# Patient Record
Sex: FEMALE | Race: BLACK OR AFRICAN AMERICAN | NOT HISPANIC OR LATINO | Employment: FULL TIME | URBAN - METROPOLITAN AREA
[De-identification: names, ages, dates, MRNs, and addresses within clinical notes are randomized per-mention and may not be internally consistent; named-entity substitution may affect disease eponyms.]

---

## 2023-04-03 LAB
ALANINE AMINOTRANSFERASE (SGPT) (U/L) IN SER/PLAS: 19 U/L (ref 7–45)
ALBUMIN (G/DL) IN SER/PLAS: 4.3 G/DL (ref 3.4–5)
ALKALINE PHOSPHATASE (U/L) IN SER/PLAS: 84 U/L (ref 33–110)
ANION GAP IN SER/PLAS: 13 MMOL/L (ref 10–20)
ASPARTATE AMINOTRANSFERASE (SGOT) (U/L) IN SER/PLAS: 13 U/L (ref 9–39)
BILIRUBIN TOTAL (MG/DL) IN SER/PLAS: 0.4 MG/DL (ref 0–1.2)
CALCIUM (MG/DL) IN SER/PLAS: 9.8 MG/DL (ref 8.6–10.6)
CARBON DIOXIDE, TOTAL (MMOL/L) IN SER/PLAS: 28 MMOL/L (ref 21–32)
CHLORIDE (MMOL/L) IN SER/PLAS: 105 MMOL/L (ref 98–107)
CHOLESTEROL (MG/DL) IN SER/PLAS: 255 MG/DL (ref 0–199)
CHOLESTEROL IN HDL (MG/DL) IN SER/PLAS: 36.6 MG/DL
CHOLESTEROL/HDL RATIO: 7
CREATININE (MG/DL) IN SER/PLAS: 0.58 MG/DL (ref 0.5–1.05)
GFR FEMALE: >90 ML/MIN/1.73M2
GLUCOSE (MG/DL) IN SER/PLAS: 107 MG/DL (ref 74–99)
LDL: 172 MG/DL (ref 0–99)
NON HDL CHOLESTEROL: 218 MG/DL
POTASSIUM (MMOL/L) IN SER/PLAS: 4.2 MMOL/L (ref 3.5–5.3)
PROTEIN TOTAL: 7 G/DL (ref 6.4–8.2)
SODIUM (MMOL/L) IN SER/PLAS: 142 MMOL/L (ref 136–145)
THYROTROPIN (MIU/L) IN SER/PLAS BY DETECTION LIMIT <= 0.05 MIU/L: 1.1 MIU/L (ref 0.44–3.98)
TRIGLYCERIDE (MG/DL) IN SER/PLAS: 231 MG/DL (ref 0–149)
URATE (MG/DL) IN SER/PLAS: 6.7 MG/DL (ref 2.3–6.7)
UREA NITROGEN (MG/DL) IN SER/PLAS: 13 MG/DL (ref 6–23)
VLDL: 46 MG/DL (ref 0–40)

## 2023-07-06 ENCOUNTER — LAB (OUTPATIENT)
Dept: LAB | Facility: LAB | Age: 53
End: 2023-07-06
Payer: COMMERCIAL

## 2023-07-06 LAB
ALANINE AMINOTRANSFERASE (SGPT) (U/L) IN SER/PLAS: 27 U/L (ref 7–45)
ALBUMIN (G/DL) IN SER/PLAS: 4.4 G/DL (ref 3.4–5)
ALKALINE PHOSPHATASE (U/L) IN SER/PLAS: 88 U/L (ref 33–110)
ANION GAP IN SER/PLAS: 14 MMOL/L (ref 10–20)
ASPARTATE AMINOTRANSFERASE (SGOT) (U/L) IN SER/PLAS: 16 U/L (ref 9–39)
BILIRUBIN TOTAL (MG/DL) IN SER/PLAS: 0.4 MG/DL (ref 0–1.2)
CALCIUM (MG/DL) IN SER/PLAS: 9.8 MG/DL (ref 8.6–10.6)
CARBON DIOXIDE, TOTAL (MMOL/L) IN SER/PLAS: 26 MMOL/L (ref 21–32)
CHLORIDE (MMOL/L) IN SER/PLAS: 107 MMOL/L (ref 98–107)
CHOLESTEROL (MG/DL) IN SER/PLAS: 211 MG/DL (ref 0–199)
CHOLESTEROL IN HDL (MG/DL) IN SER/PLAS: 37.3 MG/DL
CHOLESTEROL/HDL RATIO: 5.7
CREATINE KINASE (U/L) IN SER/PLAS: 89 U/L (ref 0–215)
CREATININE (MG/DL) IN SER/PLAS: 0.64 MG/DL (ref 0.5–1.05)
ESTIMATED AVERAGE GLUCOSE FOR HBA1C: 120 MG/DL
GFR FEMALE: >90 ML/MIN/1.73M2
GLUCOSE (MG/DL) IN SER/PLAS: 90 MG/DL (ref 74–99)
HEMOGLOBIN A1C/HEMOGLOBIN TOTAL IN BLOOD: 5.8 %
LDL: 144 MG/DL (ref 0–99)
POTASSIUM (MMOL/L) IN SER/PLAS: 4.8 MMOL/L (ref 3.5–5.3)
PROTEIN TOTAL: 7.4 G/DL (ref 6.4–8.2)
SODIUM (MMOL/L) IN SER/PLAS: 142 MMOL/L (ref 136–145)
THYROTROPIN (MIU/L) IN SER/PLAS BY DETECTION LIMIT <= 0.05 MIU/L: 1.11 MIU/L (ref 0.44–3.98)
TRIGLYCERIDE (MG/DL) IN SER/PLAS: 150 MG/DL (ref 0–149)
URATE (MG/DL) IN SER/PLAS: 7.8 MG/DL (ref 2.3–6.7)
UREA NITROGEN (MG/DL) IN SER/PLAS: 14 MG/DL (ref 6–23)
VLDL: 30 MG/DL (ref 0–40)

## 2023-07-13 LAB — TOBACCO SCREEN, URINE: POSITIVE

## 2023-09-12 LAB
ALANINE AMINOTRANSFERASE (SGPT) (U/L) IN SER/PLAS: 25 U/L (ref 7–45)
ALBUMIN (G/DL) IN SER/PLAS: 4.4 G/DL (ref 3.4–5)
ALKALINE PHOSPHATASE (U/L) IN SER/PLAS: 89 U/L (ref 33–110)
ANION GAP IN SER/PLAS: 14 MMOL/L (ref 10–20)
ASPARTATE AMINOTRANSFERASE (SGOT) (U/L) IN SER/PLAS: 15 U/L (ref 9–39)
BILIRUBIN TOTAL (MG/DL) IN SER/PLAS: 0.3 MG/DL (ref 0–1.2)
CALCIUM (MG/DL) IN SER/PLAS: 9.6 MG/DL (ref 8.6–10.6)
CARBON DIOXIDE, TOTAL (MMOL/L) IN SER/PLAS: 27 MMOL/L (ref 21–32)
CHLORIDE (MMOL/L) IN SER/PLAS: 104 MMOL/L (ref 98–107)
CREATININE (MG/DL) IN SER/PLAS: 0.66 MG/DL (ref 0.5–1.05)
ESTIMATED AVERAGE GLUCOSE FOR HBA1C: 120 MG/DL
GFR FEMALE: >90 ML/MIN/1.73M2
GLUCOSE (MG/DL) IN SER/PLAS: 81 MG/DL (ref 74–99)
HEMOGLOBIN A1C/HEMOGLOBIN TOTAL IN BLOOD: 5.8 %
POTASSIUM (MMOL/L) IN SER/PLAS: 4.6 MMOL/L (ref 3.5–5.3)
PROTEIN TOTAL: 7.4 G/DL (ref 6.4–8.2)
SODIUM (MMOL/L) IN SER/PLAS: 140 MMOL/L (ref 136–145)
URATE (MG/DL) IN SER/PLAS: 6.8 MG/DL (ref 2.3–6.7)
UREA NITROGEN (MG/DL) IN SER/PLAS: 13 MG/DL (ref 6–23)

## 2023-09-18 LAB
6-ACETYLMORPHINE: <25 NG/ML
7-AMINOCLONAZEPAM: <25 NG/ML
ALPHA-HYDROXYALPRAZOLAM: <25 NG/ML
ALPHA-HYDROXYMIDAZOLAM: <25 NG/ML
ALPRAZOLAM: <25 NG/ML
AMPHETAMINE (PRESENCE) IN URINE BY SCREEN METHOD: NORMAL
BARBITURATES PRESENCE IN URINE BY SCREEN METHOD: NORMAL
CANNABINOIDS IN URINE BY SCREEN METHOD: NORMAL
CHLORDIAZEPOXIDE: <25 NG/ML
CLONAZEPAM: <25 NG/ML
COCAINE (PRESENCE) IN URINE BY SCREEN METHOD: NORMAL
CODEINE: <50 NG/ML
CREATINE, URINE FOR DRUG: 90 MG/DL
DIAZEPAM: <25 NG/ML
DRUG SCREEN COMMENT URINE: NORMAL
EDDP: <25 NG/ML
FENTANYL CONFIRMATION, URINE: <2.5 NG/ML
HYDROCODONE: <25 NG/ML
HYDROMORPHONE: <25 NG/ML
LORAZEPAM: <25 NG/ML
METHADONE CONFIRMATION,URINE: <25 NG/ML
MIDAZOLAM: <25 NG/ML
MORPHINE URINE: <50 NG/ML
NORDIAZEPAM: <25 NG/ML
NORFENTANYL: <2.5 NG/ML
NORHYDROCODONE: <25 NG/ML
NOROXYCODONE: <25 NG/ML
O-DESMETHYLTRAMADOL: <50 NG/ML
OXAZEPAM: <25 NG/ML
OXYCODONE: <25 NG/ML
OXYMORPHONE: <25 NG/ML
PHENCYCLIDINE (PRESENCE) IN URINE BY SCREEN METHOD: NORMAL
TEMAZEPAM: <25 NG/ML
TRAMADOL: <50 NG/ML
ZOLPIDEM METABOLITE (ZCA): <25 NG/ML
ZOLPIDEM: <25 NG/ML

## 2023-10-30 ENCOUNTER — TELEPHONE (OUTPATIENT)
Dept: PRIMARY CARE | Facility: CLINIC | Age: 53
End: 2023-10-30
Payer: COMMERCIAL

## 2023-10-30 ENCOUNTER — PHARMACY VISIT (OUTPATIENT)
Dept: PHARMACY | Facility: CLINIC | Age: 53
End: 2023-10-30
Payer: COMMERCIAL

## 2023-10-30 ENCOUNTER — SPECIALTY PHARMACY (OUTPATIENT)
Dept: PHARMACY | Facility: CLINIC | Age: 53
End: 2023-10-30

## 2023-10-30 DIAGNOSIS — M54.9 BACK PAIN, UNSPECIFIED BACK LOCATION, UNSPECIFIED BACK PAIN LATERALITY, UNSPECIFIED CHRONICITY: ICD-10-CM

## 2023-10-30 DIAGNOSIS — M25.511 BILATERAL SHOULDER PAIN, UNSPECIFIED CHRONICITY: ICD-10-CM

## 2023-10-30 DIAGNOSIS — M54.2 NECK PAIN: Primary | ICD-10-CM

## 2023-10-30 DIAGNOSIS — M25.512 BILATERAL SHOULDER PAIN, UNSPECIFIED CHRONICITY: ICD-10-CM

## 2023-10-30 PROCEDURE — RXMED WILLOW AMBULATORY MEDICATION CHARGE

## 2023-11-02 ENCOUNTER — APPOINTMENT (OUTPATIENT)
Dept: INTEGRATIVE MEDICINE | Facility: CLINIC | Age: 53
End: 2023-11-02

## 2023-11-20 ENCOUNTER — TELEPHONE (OUTPATIENT)
Dept: PRIMARY CARE | Facility: CLINIC | Age: 53
End: 2023-11-20
Payer: COMMERCIAL

## 2023-11-20 DIAGNOSIS — F41.9 ANXIETY: Primary | ICD-10-CM

## 2023-11-21 NOTE — TELEPHONE ENCOUNTER
Called pt and relayed info.  Pt will call back and make AQ appt for Kaleb.    Please put in referral to weight loss clinic.  Gave her  number.  LG

## 2023-11-28 ENCOUNTER — PHARMACY VISIT (OUTPATIENT)
Dept: PHARMACY | Facility: CLINIC | Age: 53
End: 2023-11-28
Payer: COMMERCIAL

## 2023-11-28 ENCOUNTER — SPECIALTY PHARMACY (OUTPATIENT)
Dept: PHARMACY | Facility: CLINIC | Age: 53
End: 2023-11-28

## 2023-11-28 PROCEDURE — RXMED WILLOW AMBULATORY MEDICATION CHARGE

## 2023-11-30 ENCOUNTER — PHARMACY VISIT (OUTPATIENT)
Dept: PHARMACY | Facility: CLINIC | Age: 53
End: 2023-11-30

## 2023-11-30 ENCOUNTER — TELEPHONE (OUTPATIENT)
Dept: PRIMARY CARE | Facility: CLINIC | Age: 53
End: 2023-11-30
Payer: COMMERCIAL

## 2023-11-30 DIAGNOSIS — M25.561 PAIN IN BOTH KNEES, UNSPECIFIED CHRONICITY: Primary | ICD-10-CM

## 2023-11-30 DIAGNOSIS — M25.562 PAIN IN BOTH KNEES, UNSPECIFIED CHRONICITY: Primary | ICD-10-CM

## 2023-11-30 RX ORDER — DICLOFENAC SODIUM 10 MG/G
4 GEL TOPICAL 4 TIMES DAILY
Qty: 100 G | Refills: 1 | Status: SHIPPED | OUTPATIENT
Start: 2023-11-30 | End: 2023-12-01 | Stop reason: SDUPTHER

## 2023-11-30 NOTE — TELEPHONE ENCOUNTER
"Patient sent me a Jabber stating \" can you ask Dr CARVAJAL if there is anything she can prescribe for excruciating knee pain??from me taking dupixwnt and that a side effect joint pain , all this on top of needing knee replacements\"   "

## 2023-12-01 DIAGNOSIS — M25.562 PAIN IN BOTH KNEES, UNSPECIFIED CHRONICITY: ICD-10-CM

## 2023-12-01 DIAGNOSIS — M25.561 PAIN IN BOTH KNEES, UNSPECIFIED CHRONICITY: ICD-10-CM

## 2023-12-01 RX ORDER — DICLOFENAC SODIUM 10 MG/G
4 GEL TOPICAL 4 TIMES DAILY
Qty: 100 G | Refills: 1 | Status: SHIPPED | OUTPATIENT
Start: 2023-12-01 | End: 2024-02-09 | Stop reason: WASHOUT

## 2023-12-01 NOTE — TELEPHONE ENCOUNTER
Called Pt.  Rx sent to wrong pharm.  She never uses [facility].  Updated/edited her pharm. List.  Resent Rx to correct pharmacy.

## 2023-12-05 ENCOUNTER — OFFICE VISIT (OUTPATIENT)
Dept: ORTHOPEDIC SURGERY | Facility: CLINIC | Age: 53
End: 2023-12-05
Payer: COMMERCIAL

## 2023-12-05 VITALS — WEIGHT: 208 LBS | HEIGHT: 60 IN | BODY MASS INDEX: 40.84 KG/M2

## 2023-12-05 DIAGNOSIS — M17.12 PRIMARY OSTEOARTHRITIS OF LEFT KNEE: Primary | ICD-10-CM

## 2023-12-05 PROCEDURE — 2500000005 HC RX 250 GENERAL PHARMACY W/O HCPCS: Performed by: ORTHOPAEDIC SURGERY

## 2023-12-05 PROCEDURE — 2500000004 HC RX 250 GENERAL PHARMACY W/ HCPCS (ALT 636 FOR OP/ED): Performed by: ORTHOPAEDIC SURGERY

## 2023-12-05 PROCEDURE — 20610 DRAIN/INJ JOINT/BURSA W/O US: CPT | Mod: LT | Performed by: ORTHOPAEDIC SURGERY

## 2023-12-05 PROCEDURE — 20610 DRAIN/INJ JOINT/BURSA W/O US: CPT | Performed by: ORTHOPAEDIC SURGERY

## 2023-12-05 RX ORDER — METHYLPREDNISOLONE ACETATE 40 MG/ML
40 INJECTION, SUSPENSION INTRA-ARTICULAR; INTRALESIONAL; INTRAMUSCULAR; SOFT TISSUE
Status: COMPLETED | OUTPATIENT
Start: 2023-12-05 | End: 2023-12-05

## 2023-12-05 RX ORDER — LIDOCAINE HYDROCHLORIDE 20 MG/ML
2 INJECTION, SOLUTION INFILTRATION; PERINEURAL
Status: COMPLETED | OUTPATIENT
Start: 2023-12-05 | End: 2023-12-05

## 2023-12-05 RX ADMIN — METHYLPREDNISOLONE ACETATE 40 MG: 40 INJECTION, SUSPENSION INTRALESIONAL; INTRAMUSCULAR; INTRASYNOVIAL; SOFT TISSUE at 18:05

## 2023-12-05 RX ADMIN — LIDOCAINE HYDROCHLORIDE 2 ML: 20 INJECTION, SOLUTION INFILTRATION; PERINEURAL at 18:05

## 2023-12-05 ASSESSMENT — PAIN SCALES - GENERAL: PAINLEVEL_OUTOF10: 10 - WORST POSSIBLE PAIN

## 2023-12-05 ASSESSMENT — PAIN DESCRIPTION - DESCRIPTORS: DESCRIPTORS: SHARP;THROBBING

## 2023-12-05 ASSESSMENT — PAIN - FUNCTIONAL ASSESSMENT: PAIN_FUNCTIONAL_ASSESSMENT: 0-10

## 2023-12-05 NOTE — PROGRESS NOTES
Injected left knee doesn't want surgery  L Inj/Asp: L knee on 12/5/2023 6:05 PM  Indications: pain, joint swelling and diagnostic evaluation  Details: 21 G needle, anterolateral approach  Medications: 40 mg methylPREDNISolone acetate 40 mg/mL; 2 mL lidocaine 20 mg/mL (2 %)  Outcome: tolerated well, no immediate complications  Procedure, treatment alternatives, risks and benefits explained, specific risks discussed. Consent was given by the patient. Immediately prior to procedure a time out was called to verify the correct patient, procedure, equipment, support staff and site/side marked as required. Patient was prepped and draped in the usual sterile fashion.

## 2023-12-18 ENCOUNTER — SPECIALTY PHARMACY (OUTPATIENT)
Dept: PHARMACY | Facility: CLINIC | Age: 53
End: 2023-12-18

## 2023-12-18 ENCOUNTER — PHARMACY VISIT (OUTPATIENT)
Dept: PHARMACY | Facility: CLINIC | Age: 53
End: 2023-12-18
Payer: COMMERCIAL

## 2023-12-18 ENCOUNTER — TELEPHONE (OUTPATIENT)
Dept: PRIMARY CARE | Facility: CLINIC | Age: 53
End: 2023-12-18
Payer: COMMERCIAL

## 2023-12-18 DIAGNOSIS — R05.9 COUGH, UNSPECIFIED TYPE: Primary | ICD-10-CM

## 2023-12-18 PROCEDURE — RXMED WILLOW AMBULATORY MEDICATION CHARGE

## 2023-12-18 RX ORDER — HYDROCODONE BITARTRATE AND HOMATROPINE METHYLBROMIDE ORAL SOLUTION 5; 1.5 MG/5ML; MG/5ML
5 LIQUID ORAL NIGHTLY PRN
Qty: 35 ML | Refills: 0 | Status: SHIPPED | OUTPATIENT
Start: 2023-12-18 | End: 2023-12-19

## 2023-12-18 RX ORDER — HYDROCODONE BITARTRATE AND HOMATROPINE METHYLBROMIDE ORAL SOLUTION 5; 1.5 MG/5ML; MG/5ML
5 LIQUID ORAL NIGHTLY PRN
COMMUNITY
Start: 2023-03-03 | End: 2023-12-18 | Stop reason: SDUPTHER

## 2023-12-18 RX ORDER — BENZONATATE 100 MG/1
100 CAPSULE ORAL 3 TIMES DAILY PRN
Qty: 30 CAPSULE | Refills: 0 | Status: SHIPPED | OUTPATIENT
Start: 2023-12-18 | End: 2023-12-19 | Stop reason: SDUPTHER

## 2023-12-18 NOTE — TELEPHONE ENCOUNTER
Pt sent me message on secure chat asking for a refill on her cough medicine.   Provided no other information

## 2023-12-18 NOTE — TELEPHONE ENCOUNTER
Patient called requesting a refill on her cough medication she tried to get a virtual this week but there are no time slots available. Can you send in the Rx for the patient or give a time for her to have a virtual before 12/29. Please advise

## 2023-12-19 ENCOUNTER — TELEMEDICINE (OUTPATIENT)
Dept: PRIMARY CARE | Facility: CLINIC | Age: 53
End: 2023-12-19
Payer: COMMERCIAL

## 2023-12-19 DIAGNOSIS — J40 BRONCHITIS: Primary | ICD-10-CM

## 2023-12-19 DIAGNOSIS — R05.9 COUGH, UNSPECIFIED TYPE: ICD-10-CM

## 2023-12-19 PROCEDURE — 99213 OFFICE O/P EST LOW 20 MIN: CPT | Performed by: INTERNAL MEDICINE

## 2023-12-19 RX ORDER — HYDROCODONE BITARTRATE AND HOMATROPINE METHYLBROMIDE ORAL SOLUTION 5; 1.5 MG/5ML; MG/5ML
5 LIQUID ORAL EVERY 6 HOURS PRN
Qty: 100 ML | Refills: 0 | Status: SHIPPED | OUTPATIENT
Start: 2023-12-19 | End: 2023-12-28 | Stop reason: SDUPTHER

## 2023-12-19 RX ORDER — FLUTICASONE PROPIONATE 50 MCG
2 SPRAY, SUSPENSION (ML) NASAL DAILY
Qty: 16 G | Refills: 5 | Status: SHIPPED | OUTPATIENT
Start: 2023-12-19 | End: 2024-12-18

## 2023-12-19 RX ORDER — METHYLPREDNISOLONE 4 MG/1
TABLET ORAL
Qty: 21 TABLET | Refills: 0 | Status: SHIPPED | OUTPATIENT
Start: 2023-12-19 | End: 2024-02-09 | Stop reason: WASHOUT

## 2023-12-19 RX ORDER — BENZONATATE 100 MG/1
100 CAPSULE ORAL 3 TIMES DAILY PRN
Qty: 30 CAPSULE | Refills: 0 | Status: SHIPPED | OUTPATIENT
Start: 2023-12-19 | End: 2023-12-29

## 2023-12-19 RX ORDER — AZITHROMYCIN 250 MG/1
TABLET, FILM COATED ORAL
Qty: 6 TABLET | Refills: 0 | Status: SHIPPED | OUTPATIENT
Start: 2023-12-19 | End: 2023-12-28 | Stop reason: SDUPTHER

## 2023-12-19 NOTE — PROGRESS NOTES
Subjective   Patient ID: Kandi Bliss is a 53 y.o. female who presents for No chief complaint on file..    HPI  Patient on with me on a virtual visit  Son was sick last week , coughing   Review of Systems  General: see hpi  Ears, Nose, Throat : see hpi  Dermatologic: Negative for new skin conditions, rash  Respiratory: see hpi  Cardiovascular: Negative for chest pain, palpitations, or leg swelling  Gastrointestinal: Negative for nausea/vomiting, abdominal pain, changes in bowel habits  Neurological: Negative for dizziness see hpi headaches    Previous history  Past Medical History:   Diagnosis Date    Abdominal distension (gaseous) 02/04/2021    Bloating    Abnormal findings on diagnostic imaging of other specified body structures 12/23/2021    Abnormal ultrasound of pelvis    Acute candidiasis of vulva and vagina 10/13/2016    Yeast infection of the vagina    Acute gastric ulcer without hemorrhage or perforation 05/13/2016    Acute gastric ulcer    Acute stress reaction 12/20/2018    Acute reaction to stress    Acute upper respiratory infection, unspecified 09/26/2017    Acute upper respiratory infection    Chronic rhinitis 10/08/2018    Rhinitis    Dermatitis, unspecified 10/29/2016    Eczema    Diarrhea, unspecified 11/16/2020    Diarrhea    Dietary counseling and surveillance     Pre-bariatric surgery nutrition evaluation    Disorder of white blood cells, unspecified 09/22/2022    Abnormal white blood cell    Dorsalgia, unspecified 12/10/2021    Back pain    Dyspnea, unspecified 03/13/2020    Dyspnea    Encounter for other screening for malignant neoplasm of breast 12/13/2021    Breast screening    Encounter for screening for malignant neoplasm of colon 06/05/2021    Colon cancer screening    Epigastric pain 05/23/2016    Abdominal pain, epigastric    Frequency of micturition 01/10/2019    Urinary frequency    Headache, unspecified 11/16/2020    Headache    Hyperuricemia without signs of inflammatory  arthritis and tophaceous disease 09/22/2022    Hyperuricemia    Influenza due to unidentified influenza virus with other respiratory manifestations 03/10/2020    Influenza    Other conditions influencing health status 11/16/2020    Sweating    Other disturbances of skin sensation 09/06/2018    Skin pain    Other injury of unspecified body region, initial encounter 10/19/2022    Muscle strain    Other malaise 03/24/2022    Malaise and fatigue    Other specified symptoms and signs involving the circulatory and respiratory systems 10/02/2020    Runny nose    Pain in right hand 11/03/2016    Right hand pain    Pain in right shoulder 12/16/2020    Bilateral shoulder pain    Pain in right shoulder 06/13/2018    Right shoulder pain    Pain in unspecified joint 03/18/2020    Joint pain    Pain in unspecified knee 06/02/2015    Acute knee pain    Palpitations 03/13/2020    Palpitations    Paresthesia of skin 05/30/2019    Paresthesia    Periapical abscess without sinus 04/27/2022    Tooth infection    Personal history of other diseases of the female genital tract 01/22/2019    History of ovarian cyst    Personal history of other diseases of the nervous system and sense organs 01/03/2020    History of acute conjunctivitis    Personal history of other diseases of the nervous system and sense organs 12/27/2018    History of acute otitis media    Personal history of other diseases of the respiratory system 01/24/2017    History of acute bronchitis    Personal history of other diseases of the respiratory system 10/21/2015    History of acute bronchitis    Personal history of other infectious and parasitic diseases 10/13/2016    History of trichomonal vaginitis    Personal history of peptic ulcer disease 04/26/2021    History of gastric ulcer    Rash and other nonspecific skin eruption 09/26/2016    Rash    Rash and other nonspecific skin eruption 08/05/2015    Rash    Sleep disorder, unspecified 11/16/2020    Sleep disturbance     Unspecified abdominal pain 06/05/2021    Abdominal pain    Unspecified acute and subacute iridocyclitis 11/20/2017    Acute iritis    Unspecified disturbances of skin sensation 09/07/2016    Paresthesias/numbness    Unspecified iridocyclitis 01/19/2018    Traumatic iritis    Unspecified otitis externa, unspecified ear 01/26/2016    Otitis externa    Unspecified visual field defects 01/19/2018    Unspecified visual field defects    Unspecified visual field defects 01/19/2018    Unspecified visual field defects     Past Surgical History:   Procedure Laterality Date    HYSTERECTOMY  05/26/2015    Hysterectomy    MR HEAD ANGIO WO IV CONTRAST  11/27/2018    MR HEAD ANGIO WO IV CONTRAST 11/27/2018 [facility]    MR NECK ANGIO WO IV CONTRAST  11/27/2018    MR NECK ANGIO WO IV CONTRAST 11/27/2018 [facility]    OTHER SURGICAL HISTORY  07/26/2019    Laparoscopic salpingo-oophorectomy    OTHER SURGICAL HISTORY  03/22/2019    Laparoscopy    TUBAL LIGATION  05/26/2015    Tubal Ligation     Social History     Tobacco Use    Smoking status: Every Day     Packs/day: .5     Types: Cigarettes    Smokeless tobacco: Never   Substance Use Topics    Alcohol use: Yes     Alcohol/week: 1.0 standard drink of alcohol     Types: 1 Glasses of wine per week    Drug use: Never     No family history on file.  Allergies   Allergen Reactions    Avelox [Moxifloxacin] Diarrhea     Current Outpatient Medications   Medication Instructions    benzonatate (TESSALON) 100 mg, oral, 3 times daily PRN, Do not crush or chew.    diclofenac sodium (Voltaren) 1 % gel gel 1 Application, Topical, 4 times daily    dupilumab (Dupixent) 300 mg/2 mL syringe injection INJECT 300 MG (1 SYRINGE) UNDER THE SKIN EVERY OTHER WEEK, AS DIRECTED    hydrocodone-homatropine (Hycodan) 5-1.5 mg/5 mL syrup 5 mL, oral, Nightly PRN       Objective       Physical Exam  via [employer]  General: Well groomed, well nourished , speaks full sentences  Alert  Cooperative , no apparent distress   Skin: Good color does not appear dehydrated   Eyes: Extra ocular muscle movements intact, anicteric sclerae  Neck: Supple, with good range of motion looking behind her and moving head sideways to cough   Neurological: Alert, oriented        Assessment/Plan   Kandi Bliss is a 53 y.o. female who presents for the concerns below:    Problem List Items Addressed This Visit    None       COUGH PLAN Rest, sleep is important.   Hydration important at least 6-8 glasses of water  Take analgesics Tylenol   Frequent hand washing  Sanitize surroundings  Guaifenesin if tolerated  Change toothbrush once recovered.   Antimicrobial therapy as appropriate - Amoxicillin if not allergic, Azithromycin  medrol dose pack or Doxycycline  if with allergies Live culture yogurt or probiotics to help regrow good bacteria  Will also add benzonatate perles and have patient over the counter guaifenesin DM . Call if shortness of breath, blood and sputum, change in character of cough, development of fever or chills.   If with inability to maintain nutrition and hydration seek emergent care.  Avoid exposure to tobacco smoke and fumes. cautioned that  antibiotic may cause c diff colitis  ,  Time Spent  with patient:  7  minutes of which greater than 50 percent was spent counselling and coordinating care.      Discussed with:   Return in :    Portions of this note were generated using digital voice recognition software, and may contain grammatical errors       Kaleb Barnett MD  12/19/23  9:51 AM

## 2023-12-27 DIAGNOSIS — G47.00 INSOMNIA, UNSPECIFIED: ICD-10-CM

## 2023-12-28 ENCOUNTER — TELEPHONE (OUTPATIENT)
Dept: PRIMARY CARE | Facility: CLINIC | Age: 53
End: 2023-12-28
Payer: COMMERCIAL

## 2023-12-28 DIAGNOSIS — J40 BRONCHITIS: ICD-10-CM

## 2023-12-28 DIAGNOSIS — R05.9 COUGH, UNSPECIFIED TYPE: ICD-10-CM

## 2023-12-28 RX ORDER — HYDROCODONE BITARTRATE AND HOMATROPINE METHYLBROMIDE ORAL SOLUTION 5; 1.5 MG/5ML; MG/5ML
5 LIQUID ORAL EVERY 6 HOURS PRN
Qty: 100 ML | Refills: 0 | Status: SHIPPED | OUTPATIENT
Start: 2023-12-28 | End: 2024-01-08 | Stop reason: SDUPTHER

## 2023-12-28 RX ORDER — ZOLPIDEM TARTRATE 10 MG/1
10 TABLET ORAL NIGHTLY PRN
COMMUNITY
Start: 2023-11-18 | End: 2024-01-08 | Stop reason: WASHOUT

## 2023-12-28 RX ORDER — ZOLPIDEM TARTRATE 10 MG/1
10 TABLET ORAL NIGHTLY PRN
Qty: 30 TABLET | Refills: 2 | Status: SHIPPED | OUTPATIENT
Start: 2023-12-28 | End: 2024-01-08 | Stop reason: WASHOUT

## 2023-12-28 RX ORDER — AZITHROMYCIN 250 MG/1
TABLET, FILM COATED ORAL
Qty: 6 TABLET | Refills: 0 | Status: SHIPPED | OUTPATIENT
Start: 2023-12-28 | End: 2024-01-02

## 2024-01-04 ENCOUNTER — TELEPHONE (OUTPATIENT)
Dept: PRIMARY CARE | Facility: CLINIC | Age: 54
End: 2024-01-04
Payer: COMMERCIAL

## 2024-01-08 ENCOUNTER — TELEPHONE (OUTPATIENT)
Dept: PRIMARY CARE | Facility: CLINIC | Age: 54
End: 2024-01-08
Payer: COMMERCIAL

## 2024-01-08 DIAGNOSIS — J40 BRONCHITIS: ICD-10-CM

## 2024-01-08 DIAGNOSIS — R05.9 COUGH, UNSPECIFIED TYPE: ICD-10-CM

## 2024-01-08 RX ORDER — HYDROCODONE BITARTRATE AND HOMATROPINE METHYLBROMIDE ORAL SOLUTION 5; 1.5 MG/5ML; MG/5ML
5 LIQUID ORAL EVERY 6 HOURS PRN
Qty: 100 ML | Refills: 0 | Status: SHIPPED | OUTPATIENT
Start: 2024-01-08 | End: 2024-01-13

## 2024-01-08 NOTE — TELEPHONE ENCOUNTER
Pt called stating that she is still experiencing symptoms of chills, stuffed nose, dry burning nasal passages, +cough     Wondering if you can call in something for her

## 2024-01-12 ENCOUNTER — APPOINTMENT (OUTPATIENT)
Dept: INTEGRATIVE MEDICINE | Facility: CLINIC | Age: 54
End: 2024-01-12

## 2024-01-12 ENCOUNTER — PHARMACY VISIT (OUTPATIENT)
Dept: PHARMACY | Facility: CLINIC | Age: 54
End: 2024-01-12
Payer: COMMERCIAL

## 2024-01-12 ENCOUNTER — SPECIALTY PHARMACY (OUTPATIENT)
Dept: PHARMACY | Facility: CLINIC | Age: 54
End: 2024-01-12

## 2024-01-12 DIAGNOSIS — F41.9 ANXIETY DISORDER, UNSPECIFIED: ICD-10-CM

## 2024-01-12 PROCEDURE — RXMED WILLOW AMBULATORY MEDICATION CHARGE

## 2024-01-12 RX ORDER — ALPRAZOLAM 0.5 MG/1
0.5 TABLET ORAL 2 TIMES DAILY
Qty: 60 TABLET | Refills: 0 | Status: SHIPPED | OUTPATIENT
Start: 2024-01-12

## 2024-01-12 RX ORDER — NALOXONE HYDROCHLORIDE 4 MG/.1ML
4 SPRAY NASAL AS NEEDED
Qty: 2 EACH | Refills: 0 | Status: SHIPPED | OUTPATIENT
Start: 2024-01-12 | End: 2024-02-09 | Stop reason: ENTERED-IN-ERROR

## 2024-01-22 ENCOUNTER — TELEPHONE (OUTPATIENT)
Dept: PRIMARY CARE | Facility: CLINIC | Age: 54
End: 2024-01-22
Payer: COMMERCIAL

## 2024-01-22 DIAGNOSIS — F43.29 STRESS AND ADJUSTMENT REACTION: ICD-10-CM

## 2024-01-22 DIAGNOSIS — R42 DIZZINESS: ICD-10-CM

## 2024-01-22 DIAGNOSIS — F41.9 ANXIETY: ICD-10-CM

## 2024-01-22 DIAGNOSIS — I25.10 CORONARY ARTERY DISEASE INVOLVING NATIVE HEART, UNSPECIFIED VESSEL OR LESION TYPE, UNSPECIFIED WHETHER ANGINA PRESENT: ICD-10-CM

## 2024-01-22 NOTE — TELEPHONE ENCOUNTER
Pt states [place] paperwork was faxed to office last week.  Wants to make sure they get filled out and sent back in asap.

## 2024-01-22 NOTE — TELEPHONE ENCOUNTER
1/24/24 11:11am -  pt has not heard back yet.  Please address       1.)  pt requesting testing to be done on her heart and arteries to make sure she has no blockages.  Pt is afraid she is going to have a stroke with all the stress she is under.     2.) pt wants a medication to curve her appetite.  Pt states she gets nervous ''thinking'' about things and this causes her to eat.     3.) pts mother has cancer.  She is a nervous wreck about it.  She is wondering if you would increase her anxiety meds - or put her on something daily that is different than xanax.

## 2024-01-24 ENCOUNTER — TELEPHONE (OUTPATIENT)
Dept: PRIMARY CARE | Facility: CLINIC | Age: 54
End: 2024-01-24
Payer: COMMERCIAL

## 2024-01-24 RX ORDER — BUPROPION HYDROCHLORIDE 75 MG/1
75 TABLET ORAL 2 TIMES DAILY
Qty: 60 TABLET | Refills: 1 | Status: SHIPPED | OUTPATIENT
Start: 2024-01-24 | End: 2024-02-09 | Stop reason: SINTOL

## 2024-01-24 NOTE — TELEPHONE ENCOUNTER
1) Pt read you [employer] message.  She states she is receptive to trying those two medications.  If you could call it in to her pharmacy.     2) She is stating she thinks she should do another carotid       3.) Pt also wanting you to prescribe the colonoscopy prep so she can clean out her system.

## 2024-01-26 ENCOUNTER — TELEPHONE (OUTPATIENT)
Dept: PRIMARY CARE | Facility: CLINIC | Age: 54
End: 2024-01-26
Payer: COMMERCIAL

## 2024-01-26 NOTE — TELEPHONE ENCOUNTER
Pt states she wont be able to take the bupropion because it can cause seizures.     Is there another medicine she can take?

## 2024-01-30 DIAGNOSIS — E78.00 PURE HYPERCHOLESTEROLEMIA, UNSPECIFIED: ICD-10-CM

## 2024-01-31 RX ORDER — ATORVASTATIN CALCIUM 40 MG/1
40 TABLET, FILM COATED ORAL DAILY
Qty: 90 TABLET | Refills: 0 | Status: SHIPPED | OUTPATIENT
Start: 2024-01-31 | End: 2024-02-09 | Stop reason: SDUPTHER

## 2024-02-08 ENCOUNTER — PHARMACY VISIT (OUTPATIENT)
Dept: PHARMACY | Facility: CLINIC | Age: 54
End: 2024-02-08
Payer: COMMERCIAL

## 2024-02-08 ENCOUNTER — SPECIALTY PHARMACY (OUTPATIENT)
Dept: PHARMACY | Facility: CLINIC | Age: 54
End: 2024-02-08

## 2024-02-08 PROCEDURE — RXMED WILLOW AMBULATORY MEDICATION CHARGE

## 2024-02-09 ENCOUNTER — OFFICE VISIT (OUTPATIENT)
Dept: PRIMARY CARE | Facility: CLINIC | Age: 54
End: 2024-02-09
Payer: COMMERCIAL

## 2024-02-09 ENCOUNTER — LAB (OUTPATIENT)
Dept: LAB | Facility: LAB | Age: 54
End: 2024-02-09
Payer: COMMERCIAL

## 2024-02-09 VITALS — SYSTOLIC BLOOD PRESSURE: 123 MMHG | WEIGHT: 212 LBS | DIASTOLIC BLOOD PRESSURE: 65 MMHG | BODY MASS INDEX: 41.4 KG/M2

## 2024-02-09 DIAGNOSIS — R07.9 CHEST PAIN, UNSPECIFIED TYPE: ICD-10-CM

## 2024-02-09 DIAGNOSIS — R79.9 ABNORMAL BLOOD CHEMISTRY: ICD-10-CM

## 2024-02-09 DIAGNOSIS — E79.0 HYPERURICEMIA: ICD-10-CM

## 2024-02-09 DIAGNOSIS — E78.5 HYPERLIPIDEMIA, UNSPECIFIED HYPERLIPIDEMIA TYPE: ICD-10-CM

## 2024-02-09 DIAGNOSIS — E78.00 PURE HYPERCHOLESTEROLEMIA, UNSPECIFIED: ICD-10-CM

## 2024-02-09 DIAGNOSIS — R10.13 EPIGASTRIC PAIN: ICD-10-CM

## 2024-02-09 DIAGNOSIS — F43.29 STRESS AND ADJUSTMENT REACTION: Primary | ICD-10-CM

## 2024-02-09 LAB
ALBUMIN SERPL BCP-MCNC: 4.1 G/DL (ref 3.4–5)
ALP SERPL-CCNC: 72 U/L (ref 33–110)
ALT SERPL W P-5'-P-CCNC: 24 U/L (ref 7–45)
ANION GAP SERPL CALC-SCNC: 15 MMOL/L (ref 10–20)
AST SERPL W P-5'-P-CCNC: 18 U/L (ref 9–39)
BASOPHILS # BLD AUTO: 0.08 X10*3/UL (ref 0–0.1)
BASOPHILS NFR BLD AUTO: 0.8 %
BILIRUB SERPL-MCNC: 0.3 MG/DL (ref 0–1.2)
BUN SERPL-MCNC: 14 MG/DL (ref 6–23)
CALCIUM SERPL-MCNC: 9.5 MG/DL (ref 8.6–10.6)
CHLORIDE SERPL-SCNC: 107 MMOL/L (ref 98–107)
CO2 SERPL-SCNC: 23 MMOL/L (ref 21–32)
CREAT SERPL-MCNC: 0.57 MG/DL (ref 0.5–1.05)
EGFRCR SERPLBLD CKD-EPI 2021: >90 ML/MIN/1.73M*2
EOSINOPHIL # BLD AUTO: 0.35 X10*3/UL (ref 0–0.7)
EOSINOPHIL NFR BLD AUTO: 3.5 %
ERYTHROCYTE [DISTWIDTH] IN BLOOD BY AUTOMATED COUNT: 13.2 % (ref 11.5–14.5)
EST. AVERAGE GLUCOSE BLD GHB EST-MCNC: 117 MG/DL
GLUCOSE SERPL-MCNC: 105 MG/DL (ref 74–99)
HBA1C MFR BLD: 5.7 %
HCT VFR BLD AUTO: 37.7 % (ref 36–46)
HGB BLD-MCNC: 12.4 G/DL (ref 12–16)
IMM GRANULOCYTES # BLD AUTO: 0.03 X10*3/UL (ref 0–0.7)
IMM GRANULOCYTES NFR BLD AUTO: 0.3 % (ref 0–0.9)
LYMPHOCYTES # BLD AUTO: 3.85 X10*3/UL (ref 1.2–4.8)
LYMPHOCYTES NFR BLD AUTO: 38.4 %
MCH RBC QN AUTO: 30 PG (ref 26–34)
MCHC RBC AUTO-ENTMCNC: 32.9 G/DL (ref 32–36)
MCV RBC AUTO: 91 FL (ref 80–100)
MONOCYTES # BLD AUTO: 0.81 X10*3/UL (ref 0.1–1)
MONOCYTES NFR BLD AUTO: 8.1 %
NEUTROPHILS # BLD AUTO: 4.9 X10*3/UL (ref 1.2–7.7)
NEUTROPHILS NFR BLD AUTO: 48.9 %
NRBC BLD-RTO: 0 /100 WBCS (ref 0–0)
PLATELET # BLD AUTO: 332 X10*3/UL (ref 150–450)
POTASSIUM SERPL-SCNC: 4.2 MMOL/L (ref 3.5–5.3)
PROT SERPL-MCNC: 6.9 G/DL (ref 6.4–8.2)
RBC # BLD AUTO: 4.13 X10*6/UL (ref 4–5.2)
SODIUM SERPL-SCNC: 141 MMOL/L (ref 136–145)
URATE SERPL-MCNC: 6.2 MG/DL (ref 2.3–6.7)
WBC # BLD AUTO: 10 X10*3/UL (ref 4.4–11.3)

## 2024-02-09 PROCEDURE — 83036 HEMOGLOBIN GLYCOSYLATED A1C: CPT

## 2024-02-09 PROCEDURE — RXMED WILLOW AMBULATORY MEDICATION CHARGE

## 2024-02-09 PROCEDURE — 3008F BODY MASS INDEX DOCD: CPT | Performed by: INTERNAL MEDICINE

## 2024-02-09 PROCEDURE — 36415 COLL VENOUS BLD VENIPUNCTURE: CPT

## 2024-02-09 PROCEDURE — 80053 COMPREHEN METABOLIC PANEL: CPT

## 2024-02-09 PROCEDURE — 85025 COMPLETE CBC W/AUTO DIFF WBC: CPT

## 2024-02-09 PROCEDURE — 99214 OFFICE O/P EST MOD 30 MIN: CPT | Performed by: INTERNAL MEDICINE

## 2024-02-09 PROCEDURE — 84550 ASSAY OF BLOOD/URIC ACID: CPT

## 2024-02-09 RX ORDER — ATORVASTATIN CALCIUM 40 MG/1
40 TABLET, FILM COATED ORAL DAILY
Qty: 90 TABLET | Refills: 0 | Status: SHIPPED | OUTPATIENT
Start: 2024-02-09

## 2024-02-09 RX ORDER — BUSPIRONE HYDROCHLORIDE 5 MG/1
5 TABLET ORAL 2 TIMES DAILY
Qty: 60 TABLET | Refills: 1 | Status: SHIPPED | OUTPATIENT
Start: 2024-02-09 | End: 2025-02-08

## 2024-02-09 RX ORDER — FLUTICASONE PROPIONATE 50 MCG
1 SPRAY, SUSPENSION (ML) NASAL DAILY
Qty: 16 G | Refills: 5 | Status: SHIPPED | OUTPATIENT
Start: 2024-02-09 | End: 2024-03-19 | Stop reason: SDUPTHER

## 2024-02-09 RX ORDER — FAMOTIDINE 40 MG/1
40 TABLET, FILM COATED ORAL 2 TIMES DAILY
Qty: 30 TABLET | Refills: 1 | Status: SHIPPED | OUTPATIENT
Start: 2024-02-09 | End: 2025-02-08

## 2024-02-09 RX ORDER — FAMOTIDINE 40 MG/1
40 TABLET, FILM COATED ORAL 2 TIMES DAILY
Qty: 30 TABLET | Refills: 1 | Status: SHIPPED | OUTPATIENT
Start: 2024-02-09 | End: 2024-02-09 | Stop reason: SDUPTHER

## 2024-02-09 ASSESSMENT — PATIENT HEALTH QUESTIONNAIRE - PHQ9
1. LITTLE INTEREST OR PLEASURE IN DOING THINGS: MORE THAN HALF THE DAYS
8. MOVING OR SPEAKING SO SLOWLY THAT OTHER PEOPLE COULD HAVE NOTICED. OR THE OPPOSITE, BEING SO FIGETY OR RESTLESS THAT YOU HAVE BEEN MOVING AROUND A LOT MORE THAN USUAL: NOT AT ALL
2. FEELING DOWN, DEPRESSED OR HOPELESS: MORE THAN HALF THE DAYS
6. FEELING BAD ABOUT YOURSELF - OR THAT YOU ARE A FAILURE OR HAVE LET YOURSELF OR YOUR FAMILY DOWN: MORE THAN HALF THE DAYS
3. TROUBLE FALLING OR STAYING ASLEEP OR SLEEPING TOO MUCH: NEARLY EVERY DAY
SUM OF ALL RESPONSES TO PHQ9 QUESTIONS 1 AND 2: 4
7. TROUBLE CONCENTRATING ON THINGS, SUCH AS READING THE NEWSPAPER OR WATCHING TELEVISION: NOT AT ALL
SUM OF ALL RESPONSES TO PHQ QUESTIONS 1-9: 13
4. FEELING TIRED OR HAVING LITTLE ENERGY: MORE THAN HALF THE DAYS
9. THOUGHTS THAT YOU WOULD BE BETTER OFF DEAD, OR OF HURTING YOURSELF: NOT AT ALL
5. POOR APPETITE OR OVEREATING: MORE THAN HALF THE DAYS

## 2024-02-09 ASSESSMENT — ENCOUNTER SYMPTOMS
LOSS OF SENSATION IN FEET: 0
OCCASIONAL FEELINGS OF UNSTEADINESS: 0
DEPRESSION: 0

## 2024-02-09 ASSESSMENT — COLUMBIA-SUICIDE SEVERITY RATING SCALE - C-SSRS
6. HAVE YOU EVER DONE ANYTHING, STARTED TO DO ANYTHING, OR PREPARED TO DO ANYTHING TO END YOUR LIFE?: NO
2. HAVE YOU ACTUALLY HAD ANY THOUGHTS OF KILLING YOURSELF?: NO
1. IN THE PAST MONTH, HAVE YOU WISHED YOU WERE DEAD OR WISHED YOU COULD GO TO SLEEP AND NOT WAKE UP?: NO

## 2024-02-09 NOTE — PROGRESS NOTES
Subjective   Patient ID: Kandi Bliss is a 53 y.o. female who presents for No chief complaint on file..    HPI  Patient in for a visit  Her mother is being treated for angiosarcoma , living with her for now     Review of Systems  General: Denies fever, chills, night sweats,  Eyes: Negative for recent visual changes  Ears, Nose, Throat :  Negative for hearing changes, sinus discomfort  Dermatologic: Negative for new skin conditions, rash  Respiratory: Negative for wheezing, shortness of breath, cough  Cardiovascular: Negative for chest pain, palpitations, or leg swelling  Gastrointestinal: Negative for nausea/vomiting, abdominal pain, changes in bowel habits  Genitourinary NEGATIVE FOR URINARY INCONTINENCE urgency , frequency, discomfort   Musculoskeletal: see hpi  Neurological: Negative for headaches, dizziness    Previous history  Past Medical History:   Diagnosis Date    Abdominal distension (gaseous) 02/04/2021    Bloating    Abnormal findings on diagnostic imaging of other specified body structures 12/23/2021    Abnormal ultrasound of pelvis    Acute candidiasis of vulva and vagina 10/13/2016    Yeast infection of the vagina    Acute gastric ulcer without hemorrhage or perforation 05/13/2016    Acute gastric ulcer    Acute stress reaction 12/20/2018    Acute reaction to stress    Acute upper respiratory infection, unspecified 09/26/2017    Acute upper respiratory infection    Chronic rhinitis 10/08/2018    Rhinitis    Dermatitis, unspecified 10/29/2016    Eczema    Diarrhea, unspecified 11/16/2020    Diarrhea    Dietary counseling and surveillance     Pre-bariatric surgery nutrition evaluation    Disorder of white blood cells, unspecified 09/22/2022    Abnormal white blood cell    Dorsalgia, unspecified 12/10/2021    Back pain    Dyspnea, unspecified 03/13/2020    Dyspnea    Encounter for other screening for malignant neoplasm of breast 12/13/2021    Breast screening    Encounter for screening for  malignant neoplasm of colon 06/05/2021    Colon cancer screening    Epigastric pain 05/23/2016    Abdominal pain, epigastric    Frequency of micturition 01/10/2019    Urinary frequency    Headache, unspecified 11/16/2020    Headache    Hyperuricemia without signs of inflammatory arthritis and tophaceous disease 09/22/2022    Hyperuricemia    Influenza due to unidentified influenza virus with other respiratory manifestations 03/10/2020    Influenza    Other conditions influencing health status 11/16/2020    Sweating    Other disturbances of skin sensation 09/06/2018    Skin pain    Other injury of unspecified body region, initial encounter 10/19/2022    Muscle strain    Other malaise 03/24/2022    Malaise and fatigue    Other specified symptoms and signs involving the circulatory and respiratory systems 10/02/2020    Runny nose    Pain in right hand 11/03/2016    Right hand pain    Pain in right shoulder 12/16/2020    Bilateral shoulder pain    Pain in right shoulder 06/13/2018    Right shoulder pain    Pain in unspecified joint 03/18/2020    Joint pain    Pain in unspecified knee 06/02/2015    Acute knee pain    Palpitations 03/13/2020    Palpitations    Paresthesia of skin 05/30/2019    Paresthesia    Periapical abscess without sinus 04/27/2022    Tooth infection    Personal history of other diseases of the female genital tract 01/22/2019    History of ovarian cyst    Personal history of other diseases of the nervous system and sense organs 01/03/2020    History of acute conjunctivitis    Personal history of other diseases of the nervous system and sense organs 12/27/2018    History of acute otitis media    Personal history of other diseases of the respiratory system 01/24/2017    History of acute bronchitis    Personal history of other diseases of the respiratory system 10/21/2015    History of acute bronchitis    Personal history of other infectious and parasitic diseases 10/13/2016    History of trichomonal  vaginitis    Personal history of peptic ulcer disease 04/26/2021    History of gastric ulcer    Rash and other nonspecific skin eruption 09/26/2016    Rash    Rash and other nonspecific skin eruption 08/05/2015    Rash    Sleep disorder, unspecified 11/16/2020    Sleep disturbance    Unspecified abdominal pain 06/05/2021    Abdominal pain    Unspecified acute and subacute iridocyclitis 11/20/2017    Acute iritis    Unspecified disturbances of skin sensation 09/07/2016    Paresthesias/numbness    Unspecified iridocyclitis 01/19/2018    Traumatic iritis    Unspecified otitis externa, unspecified ear 01/26/2016    Otitis externa    Unspecified visual field defects 01/19/2018    Unspecified visual field defects    Unspecified visual field defects 01/19/2018    Unspecified visual field defects     Past Surgical History:   Procedure Laterality Date    HYSTERECTOMY  05/26/2015    Hysterectomy    MR HEAD ANGIO WO IV CONTRAST  11/27/2018    MR HEAD ANGIO WO IV CONTRAST 11/27/2018 [facility]    MR NECK ANGIO WO IV CONTRAST  11/27/2018    MR NECK ANGIO WO IV CONTRAST 11/27/2018 [facility]    OTHER SURGICAL HISTORY  07/26/2019    Laparoscopic salpingo-oophorectomy    OTHER SURGICAL HISTORY  03/22/2019    Laparoscopy    TUBAL LIGATION  05/26/2015    Tubal Ligation     Social History     Tobacco Use    Smoking status: Every Day     Packs/day: .5     Types: Cigarettes    Smokeless tobacco: Never   Vaping Use    Vaping Use: Never used   Substance Use Topics    Alcohol use: Yes     Alcohol/week: 6.0 standard drinks of alcohol     Types: 6 Cans of beer per week    Drug use: Never     No family history on file.  Allergies   Allergen Reactions    Avelox [Moxifloxacin] Diarrhea    Benzonatate Unknown     PT'S DOESN'T REMEMBER    Oxycodone Itching    Penicillins Hives    Bacitracin Rash    Latex Itching and Rash     Current Outpatient Medications   Medication Instructions    ALPRAZolam (XANAX) 0.5 mg, oral, 2 times  daily, Prn anxiety    atorvastatin (LIPITOR) 40 mg, oral, Daily    buPROPion (WELLBUTRIN) 75 mg, oral, 2 times daily    diclofenac sodium (Voltaren) 1 % gel gel 1 Application, Topical, 4 times daily    dupilumab (Dupixent) 300 mg/2 mL syringe injection INJECT 300 MG (1 SYRINGE) UNDER THE SKIN EVERY OTHER WEEK, AS DIRECTED    fluticasone (Flonase) 50 mcg/actuation nasal spray 2 sprays, Each Nostril, Daily, Shake gently. Before first use, prime pump. After use, clean tip and replace cap.    methylPREDNISolone (Medrol Dospak) 4 mg tablets Take as directed on package.    naloxone (NARCAN) 4 mg, nasal, As needed, May repeat every 2-3 minutes if needed, alternating nostrils, until medical assistance becomes available.       Objective       Physical Exam    Vital Signs: as recorded above  General: Well groomed, well nourished   Orientation:  Alert , oriented to time, place , and person   Mood and Affect:  Cooperative , no apparent distress normal affect  Skin: Good color, good turgor  Eyes: Extra ocular muscle movements intact, anicteric sclerae  Neck: Supple, full range of movement  Chest: Normal breath sounds, normal chest wall exam, symmetric, good air entry, clear to auscultation  Heart: Regular rate and rhythm, without murmur, gallop, or rubs  Abdomen soft nontender no masses felt no hepatosplenomegaly, no rebound or guarding  BACK:  no CTLS spine tenderness, no flank tenderness  Extremities: full range of movement  bilateral UE and bilateral LE,  no lower extremity edema  Neurological: Alert, oriented, cranial nerves II-XII grossly intact except for visual acuity  Sensation:  Intact   Gait: normal steady    Assessment/Plan   Kandi Bliss is a 53 y.o. female who presents for the concerns below:    Problem List Items Addressed This Visit    None    ABDOMINAL PAIN/REFLUX PLAN:  bland diet no spicy, oily, dairy, acidic i.e. orange juice, coffee, until pain is better  hold off on use of nsaids  if pain is worse will  consider imaging studies  swithc to  / or start with PPI  hold off on certain medications i.e. disphosphonates like boniva, actonel or space out when meds are taken.  if not better will consult Gastroenterology if severe or associated with fever, vomiting, bloody stool proceed to the emergency dept  Need to quit smoking    HYPERCHOLESTEROLEMIA PLAN: stable  continue current medications  Follow low cholesterol diet, encouraged high omega 3 fatty acid intake in diet, exercise, weight control. . Will Obtain AST/ALT, fasting lipid profile, creatine kinase  on statin if not done within past 3-6 months . Coenzyme Q10 200 mg a day if able to help increase HDL.    ANXIETY / BENZODIAZEPINE USE :   will start  medication buspirone     Itching chin , stop the herbal tea spearmint        Discussed with:   Return in :  2 months   Portions of this note were generated using digital voice recognition software, and may contain grammatical errors       Kaleb Barnett MD  02/09/24  2:32 PM

## 2024-02-09 NOTE — PATIENT INSTRUCTIONS
Bloodwork today  Famotidine and cholesterol medication take that 2 hours or more before bedtime and then if you need zolpidem just swallow no water    In 1-2 weeks if the stress is not better then start the buspirone      Take note if you have more symptoms after your shot

## 2024-02-14 ENCOUNTER — SPECIALTY PHARMACY (OUTPATIENT)
Dept: PHARMACY | Facility: CLINIC | Age: 54
End: 2024-02-14

## 2024-02-14 ENCOUNTER — PHARMACY VISIT (OUTPATIENT)
Dept: PHARMACY | Facility: CLINIC | Age: 54
End: 2024-02-14
Payer: COMMERCIAL

## 2024-02-16 ENCOUNTER — APPOINTMENT (OUTPATIENT)
Dept: VASCULAR MEDICINE | Facility: HOSPITAL | Age: 54
End: 2024-02-16
Payer: COMMERCIAL

## 2024-03-01 ENCOUNTER — HOSPITAL ENCOUNTER (OUTPATIENT)
Dept: VASCULAR MEDICINE | Facility: HOSPITAL | Age: 54
Discharge: HOME | End: 2024-03-01
Payer: COMMERCIAL

## 2024-03-01 DIAGNOSIS — H81.20 VESTIBULAR NEURONITIS, UNSPECIFIED EAR: ICD-10-CM

## 2024-03-01 DIAGNOSIS — I25.10 CORONARY ARTERY DISEASE INVOLVING NATIVE HEART, UNSPECIFIED VESSEL OR LESION TYPE, UNSPECIFIED WHETHER ANGINA PRESENT: ICD-10-CM

## 2024-03-01 DIAGNOSIS — R42 DIZZINESS: ICD-10-CM

## 2024-03-01 PROCEDURE — 93880 EXTRACRANIAL BILAT STUDY: CPT

## 2024-03-01 PROCEDURE — 93880 EXTRACRANIAL BILAT STUDY: CPT | Performed by: INTERNAL MEDICINE

## 2024-03-04 ENCOUNTER — TELEPHONE (OUTPATIENT)
Dept: PRIMARY CARE | Facility: CLINIC | Age: 54
End: 2024-03-04
Payer: COMMERCIAL

## 2024-03-04 DIAGNOSIS — R39.9 UTI SYMPTOMS: ICD-10-CM

## 2024-03-04 DIAGNOSIS — E78.5 HYPERLIPIDEMIA, UNSPECIFIED HYPERLIPIDEMIA TYPE: Primary | ICD-10-CM

## 2024-03-04 NOTE — TELEPHONE ENCOUNTER
Called Pt and relayed info.  Pt requested U/A.  Pt having UTI Sxs.  LG put lab order in chart, please authorize or edit.

## 2024-03-04 NOTE — PROGRESS NOTES
Pt wanted STD testing.  When I called her about her US results it sounds more likely a UTI.  Please approve U/A order.

## 2024-03-04 NOTE — TELEPHONE ENCOUNTER
1.) Patient is very concerned about her Carotid US report.  Please call and speak with patient.     2.) Patient would like checked for STDs

## 2024-03-06 DIAGNOSIS — E55.9 VITAMIN D DEFICIENCY, UNSPECIFIED: ICD-10-CM

## 2024-03-06 RX ORDER — ERGOCALCIFEROL 1.25 MG/1
1 CAPSULE ORAL
Qty: 12 CAPSULE | Refills: 0 | Status: SHIPPED | OUTPATIENT
Start: 2024-03-06

## 2024-03-06 RX ORDER — ERGOCALCIFEROL 1.25 MG/1
1.25 CAPSULE ORAL
COMMUNITY

## 2024-03-18 ENCOUNTER — TELEPHONE (OUTPATIENT)
Dept: PRIMARY CARE | Facility: CLINIC | Age: 54
End: 2024-03-18
Payer: COMMERCIAL

## 2024-03-18 NOTE — TELEPHONE ENCOUNTER
Pt has a cold.  She would like a virtual visit today so you can call in ABX.       What time can you see her?
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
## 2024-03-19 ENCOUNTER — TELEMEDICINE (OUTPATIENT)
Dept: PRIMARY CARE | Facility: CLINIC | Age: 54
End: 2024-03-19
Payer: COMMERCIAL

## 2024-03-19 DIAGNOSIS — R05.9 COUGH, UNSPECIFIED TYPE: Primary | ICD-10-CM

## 2024-03-19 PROCEDURE — 3008F BODY MASS INDEX DOCD: CPT | Performed by: INTERNAL MEDICINE

## 2024-03-19 PROCEDURE — 99213 OFFICE O/P EST LOW 20 MIN: CPT | Performed by: INTERNAL MEDICINE

## 2024-03-19 RX ORDER — AZELASTINE 1 MG/ML
1 SPRAY, METERED NASAL 2 TIMES DAILY
Qty: 30 ML | Refills: 12 | Status: SHIPPED | OUTPATIENT
Start: 2024-03-19 | End: 2025-03-19

## 2024-03-19 RX ORDER — HYDROCODONE BITARTRATE AND HOMATROPINE METHYLBROMIDE ORAL SOLUTION 5; 1.5 MG/5ML; MG/5ML
5 LIQUID ORAL NIGHTLY PRN
Qty: 70 ML | Refills: 0 | Status: SHIPPED | OUTPATIENT
Start: 2024-03-19 | End: 2024-03-26

## 2024-03-19 NOTE — PROGRESS NOTES
Subjective   Patient ID: Kandi Bliss is a 53 y.o. female who presents for No chief complaint on file..    HPI    I performed this visit using real-time telehealth tools, including an audio/video connection between Kandi Bliss and myself Dr Givens in office [facility], [place], [place]  Patient on with me on a virtual visit  Started getting sick this Monday, son is also sick , her mother is having chemo  Taking tylenol and connie seltzer   Very small amount of smoking , the cough keeps her up at night , no sleep     Review of Systems  General: see hpi  Ears, Nose, Throat : see hpi  Dermatologic: Negative for new skin conditions, rash  Respiratory: see hpi  Cardiovascular: Negative for chest pain, palpitations, or leg swelling  Gastrointestinal: Negative for nausea/vomiting, abdominal pain, changes in bowel habits  Neurological: Negative for dizziness see hpi headaches    Previous history  Past Medical History:   Diagnosis Date    Abdominal distension (gaseous) 02/04/2021    Bloating    Abnormal findings on diagnostic imaging of other specified body structures 12/23/2021    Abnormal ultrasound of pelvis    Acute candidiasis of vulva and vagina 10/13/2016    Yeast infection of the vagina    Acute gastric ulcer without hemorrhage or perforation 05/13/2016    Acute gastric ulcer    Acute stress reaction 12/20/2018    Acute reaction to stress    Acute upper respiratory infection, unspecified 09/26/2017    Acute upper respiratory infection    Chronic rhinitis 10/08/2018    Rhinitis    Dermatitis, unspecified 10/29/2016    Eczema    Diarrhea, unspecified 11/16/2020    Diarrhea    Dietary counseling and surveillance     Pre-bariatric surgery nutrition evaluation    Disorder of white blood cells, unspecified 09/22/2022    Abnormal white blood cell    Dorsalgia, unspecified 12/10/2021    Back pain    Dyspnea, unspecified 03/13/2020    Dyspnea    Encounter for other screening for malignant  neoplasm of breast 12/13/2021    Breast screening    Encounter for screening for malignant neoplasm of colon 06/05/2021    Colon cancer screening    Epigastric pain 05/23/2016    Abdominal pain, epigastric    Frequency of micturition 01/10/2019    Urinary frequency    Headache, unspecified 11/16/2020    Headache    Hyperuricemia without signs of inflammatory arthritis and tophaceous disease 09/22/2022    Hyperuricemia    Influenza due to unidentified influenza virus with other respiratory manifestations 03/10/2020    Influenza    Other conditions influencing health status 11/16/2020    Sweating    Other disturbances of skin sensation 09/06/2018    Skin pain    Other injury of unspecified body region, initial encounter 10/19/2022    Muscle strain    Other malaise 03/24/2022    Malaise and fatigue    Other specified symptoms and signs involving the circulatory and respiratory systems 10/02/2020    Runny nose    Pain in right hand 11/03/2016    Right hand pain    Pain in right shoulder 12/16/2020    Bilateral shoulder pain    Pain in right shoulder 06/13/2018    Right shoulder pain    Pain in unspecified joint 03/18/2020    Joint pain    Pain in unspecified knee 06/02/2015    Acute knee pain    Palpitations 03/13/2020    Palpitations    Paresthesia of skin 05/30/2019    Paresthesia    Periapical abscess without sinus 04/27/2022    Tooth infection    Personal history of other diseases of the female genital tract 01/22/2019    History of ovarian cyst    Personal history of other diseases of the nervous system and sense organs 01/03/2020    History of acute conjunctivitis    Personal history of other diseases of the nervous system and sense organs 12/27/2018    History of acute otitis media    Personal history of other diseases of the respiratory system 01/24/2017    History of acute bronchitis    Personal history of other diseases of the respiratory system 10/21/2015    History of acute bronchitis    Personal history of  other infectious and parasitic diseases 10/13/2016    History of trichomonal vaginitis    Personal history of peptic ulcer disease 04/26/2021    History of gastric ulcer    Rash and other nonspecific skin eruption 09/26/2016    Rash    Rash and other nonspecific skin eruption 08/05/2015    Rash    Sleep disorder, unspecified 11/16/2020    Sleep disturbance    Unspecified abdominal pain 06/05/2021    Abdominal pain    Unspecified acute and subacute iridocyclitis 11/20/2017    Acute iritis    Unspecified disturbances of skin sensation 09/07/2016    Paresthesias/numbness    Unspecified iridocyclitis 01/19/2018    Traumatic iritis    Unspecified otitis externa, unspecified ear 01/26/2016    Otitis externa    Unspecified visual field defects 01/19/2018    Unspecified visual field defects    Unspecified visual field defects 01/19/2018    Unspecified visual field defects     Past Surgical History:   Procedure Laterality Date    HYSTERECTOMY  05/26/2015    Hysterectomy    MR HEAD ANGIO WO IV CONTRAST  11/27/2018    MR HEAD ANGIO WO IV CONTRAST 11/27/2018 [facility]    MR NECK ANGIO WO IV CONTRAST  11/27/2018    MR NECK ANGIO WO IV CONTRAST 11/27/2018 [facility]    OTHER SURGICAL HISTORY  07/26/2019    Laparoscopic salpingo-oophorectomy    OTHER SURGICAL HISTORY  03/22/2019    Laparoscopy    TUBAL LIGATION  05/26/2015    Tubal Ligation     Social History     Tobacco Use    Smoking status: Every Day     Packs/day: .5     Types: Cigarettes    Smokeless tobacco: Never   Vaping Use    Vaping Use: Never used   Substance Use Topics    Alcohol use: Yes     Alcohol/week: 6.0 standard drinks of alcohol     Types: 6 Cans of beer per week    Drug use: Never     No family history on file.  Allergies   Allergen Reactions    Avelox [Moxifloxacin] Diarrhea    Benzonatate Unknown     PT'S DOESN'T REMEMBER    Oxycodone Itching    Penicillins Hives    Bacitracin Rash    Latex Itching and Rash     Current Outpatient  Medications   Medication Instructions    ALPRAZolam (XANAX) 0.5 mg, oral, 2 times daily, Prn anxiety    atorvastatin (LIPITOR) 40 mg, oral, Daily    busPIRone (BUSPAR) 5 mg, oral, 2 times daily    dupilumab (Dupixent) 300 mg/2 mL syringe injection INJECT 300 MG (1 SYRINGE) UNDER THE SKIN EVERY OTHER WEEK, AS DIRECTED    ergocalciferol (VITAMIN D-2) 1,250 mcg, oral, Weekly    ergocalciferol (VITAMIN D-2) 1.25 mg, oral, Weekly    famotidine (PEPCID) 40 mg, oral, 2 times daily    fluticasone (Flonase) 50 mcg/actuation nasal spray 2 sprays, Each Nostril, Daily, Shake gently. Before first use, prime pump. After use, clean tip and replace cap.    fluticasone (Flonase) 50 mcg/actuation nasal spray 1 spray, Each Nostril, Daily, Shake gently. Before first use, prime pump. After use, clean tip and replace cap.       Objective       Physical Exam  via [employer]  General: Well groomed, well nourished , speaks full sentences  Alert Cooperative , no apparent distress   Skin: Good color does not appear dehydrated   Eyes: Extra ocular muscle movements intact, anicteric sclerae  Neck: Supple, with good range of motion looking behind her and moving head sideways to cough   Neurological: Alert, oriented        Assessment/Plan   Kandi Bliss is a 53 y.o. female who presents for the concerns below:    Problem List Items Addressed This Visit    None  UPPER RESPIRATORY INFECTION PLAN: Supportive care, plenty of fluids, Tylenol or Ibuprofen as needed with food, OTC decongestants , if with elevated blood pressure use prescribed steroid nasal spray, antibiotics if appropriate to treat bacterial etiology, Amoxicillin (if PCN allergic - Doxycycline or erythromycin group), Live culture yogurt or probiotics to help regrow good bacteria  frequent handwashing, sanitize surrounding objects, change toothbrush If any problems arise patient to call or come back in. More than 50% of office visit time spent counseling the patients, questions  were answered.  Time Spent  with patient:  5  minutes of which greater than 50 percent was spent counselling and coordinating care.             Discussed with:   Return in :    Portions of this note were generated using digital voice recognition software, and may contain grammatical errors       Kaleb Barnett MD  03/19/24  9:23 AM

## 2024-03-20 ENCOUNTER — TELEPHONE (OUTPATIENT)
Dept: PRIMARY CARE | Facility: CLINIC | Age: 54
End: 2024-03-20
Payer: COMMERCIAL

## 2024-03-20 NOTE — TELEPHONE ENCOUNTER
Patient stated she now has laryngitis, she wants to talk to you. Requesting a call back whenever you get a chance.

## 2024-03-22 ENCOUNTER — TELEPHONE (OUTPATIENT)
Dept: PRIMARY CARE | Facility: CLINIC | Age: 54
End: 2024-03-22
Payer: COMMERCIAL

## 2024-03-28 ENCOUNTER — TELEPHONE (OUTPATIENT)
Dept: PRIMARY CARE | Facility: CLINIC | Age: 54
End: 2024-03-28
Payer: COMMERCIAL

## 2024-03-28 DIAGNOSIS — R05.2 SUBACUTE COUGH: Primary | ICD-10-CM

## 2024-03-28 RX ORDER — AZITHROMYCIN 250 MG/1
TABLET, FILM COATED ORAL
Qty: 6 TABLET | Refills: 0 | Status: SHIPPED | OUTPATIENT
Start: 2024-03-28 | End: 2024-04-02

## 2024-03-28 NOTE — TELEPHONE ENCOUNTER
Pt states she finished her cough syrup yesterday.  She states her nose is still stopped up and running.

## 2024-04-01 ENCOUNTER — SPECIALTY PHARMACY (OUTPATIENT)
Dept: PHARMACY | Facility: CLINIC | Age: 54
End: 2024-04-01

## 2024-04-09 ENCOUNTER — SPECIALTY PHARMACY (OUTPATIENT)
Dept: PHARMACY | Facility: CLINIC | Age: 54
End: 2024-04-09

## 2024-04-11 NOTE — PROGRESS NOTES
[facility] Clinical Note    Kandi Bliss is a 53 y.o. female, who is on the specialty pharmacy service of: [facility].  Kandi Bliss is taking: Dupixent.     Kandi was contacted on 4/11/2024.    Refer to the encounter summary report for documentation details about patient counseling and education.      Impression/Plan  Is patient high risk? (potential patients:  pregnancy, geriatric, pediatric)  no   Is laboratory follow-up needed? no  Is a clinical intervention needed? Patient to call and reschedule follow-up visit   Next assessment date?  6 months   Additional comments:    Medication Adherence  The importance of adherence was discussed with the patient and they were advised to take the medication as prescribed by their provider. Kandi was encouraged to call her physician's office if they have a question regarding a missed dose.     QOL/Patient Satisfaction  Rate your quality of life on scale of 1-10: -- (unable to assess)  Rate your satisfaction with  Specialty Pharmacy on scale of 1-10: 10 - Completely satisfied      Patient advised to contact the pharmacy if there are any changes to her medication list, including prescriptions, OTC medications, herbal products, or supplements. Patient was advised of [facility]’s dispensing process, refill timeline, contact information ([phone]), and patient management follow up. Patient confirmed understanding of education conducted during assessment. All patient questions and concerns were addressed to the best of my ability. Patient was encouraged to contact the specialty pharmacy with any questions or concerns.    Confirmed follow-up outreaches are properly scheduled. Reviewed goals of therapy in the program targets.    Marcin Shah, PharmD

## 2024-04-16 ENCOUNTER — PHARMACY VISIT (OUTPATIENT)
Dept: PHARMACY | Facility: CLINIC | Age: 54
End: 2024-04-16
Payer: COMMERCIAL

## 2024-04-16 ENCOUNTER — SPECIALTY PHARMACY (OUTPATIENT)
Dept: PHARMACY | Facility: CLINIC | Age: 54
End: 2024-04-16

## 2024-04-16 DIAGNOSIS — G47.00 INSOMNIA, UNSPECIFIED: ICD-10-CM

## 2024-04-16 DIAGNOSIS — G47.00 INSOMNIA, UNSPECIFIED TYPE: ICD-10-CM

## 2024-04-16 PROCEDURE — RXMED WILLOW AMBULATORY MEDICATION CHARGE

## 2024-04-16 RX ORDER — ZOLPIDEM TARTRATE 10 MG/1
10 TABLET ORAL NIGHTLY PRN
Qty: 30 TABLET | Refills: 0 | Status: CANCELLED | OUTPATIENT
Start: 2024-04-16

## 2024-04-16 RX ORDER — ZOLPIDEM TARTRATE 10 MG/1
10 TABLET ORAL NIGHTLY PRN
COMMUNITY
Start: 2024-03-06 | End: 2024-04-17

## 2024-04-17 RX ORDER — ZOLPIDEM TARTRATE 10 MG/1
10 TABLET ORAL NIGHTLY PRN
Qty: 30 TABLET | Refills: 1 | Status: SHIPPED | OUTPATIENT
Start: 2024-04-17

## 2024-04-19 ENCOUNTER — OFFICE VISIT (OUTPATIENT)
Dept: ORTHOPEDIC SURGERY | Facility: CLINIC | Age: 54
End: 2024-04-19
Payer: COMMERCIAL

## 2024-04-19 ENCOUNTER — HOSPITAL ENCOUNTER (OUTPATIENT)
Facility: HOSPITAL | Age: 54
Setting detail: OUTPATIENT SURGERY
End: 2024-04-19
Attending: ORTHOPAEDIC SURGERY | Admitting: ORTHOPAEDIC SURGERY
Payer: COMMERCIAL

## 2024-04-19 VITALS — HEIGHT: 60 IN | BODY MASS INDEX: 41.62 KG/M2 | WEIGHT: 212 LBS

## 2024-04-19 DIAGNOSIS — M17.12 PRIMARY OSTEOARTHRITIS OF LEFT KNEE: Primary | ICD-10-CM

## 2024-04-19 DIAGNOSIS — M23.41 LOOSE BODY IN KNEE, RIGHT KNEE: ICD-10-CM

## 2024-04-19 PROCEDURE — 99214 OFFICE O/P EST MOD 30 MIN: CPT | Performed by: ORTHOPAEDIC SURGERY

## 2024-04-19 PROCEDURE — 3008F BODY MASS INDEX DOCD: CPT | Performed by: ORTHOPAEDIC SURGERY

## 2024-04-19 RX ORDER — MELOXICAM 15 MG/1
15 TABLET ORAL DAILY
Qty: 30 TABLET | Refills: 0 | Status: SHIPPED | OUTPATIENT
Start: 2024-04-19 | End: 2024-05-19

## 2024-04-19 NOTE — PROGRESS NOTES
Patient presents with exacerbation of right knee pain.  She has had an injection in the past which was temporarily helpful she feels periodic locking and catching as well as pain  Past medical,family and social histories have been reviewed and are up to date.    All other body systems have been reviewed and are negative for complaint.  Constitutional: Well-developed well-nourished   Eyes: Sclerae anicteric, pupils equal and round  HENT: Normocephalic atraumatic  Cardiovascular: Pulses full, regular rate and rhythm  Respiratory: Breathing not labored, no wheezing  Integumentary: Skin intact, no lesions or rashes  Neurological: Sensation intact, no gross strength deficits, reflexes equal  Psychiatric: Alert oriented and appropriate  Hematologic/lymphatic: No lymphadenopathy  Right knee: Generous effusion Global tenderness Motion restricted because of pain  X-rays show arthritic change but a large at least 1 loose body in the posterior compartment assessment underlying arthritis symptomatic loose body recommend arthroscopic removal discussed limitations given her arthritis will likely need chondroplasty as well  Insert surgery large loose body posterior compartment underlying arthritis given the magnitude of symptoms recommend arthroscopic loose body removal and chondroplasty right knee

## 2024-05-09 ENCOUNTER — OFFICE VISIT (OUTPATIENT)
Dept: ORTHOPEDIC SURGERY | Facility: CLINIC | Age: 54
End: 2024-05-09
Payer: COMMERCIAL

## 2024-05-09 ENCOUNTER — PHARMACY VISIT (OUTPATIENT)
Dept: PHARMACY | Facility: CLINIC | Age: 54
End: 2024-05-09
Payer: COMMERCIAL

## 2024-05-09 DIAGNOSIS — M23.90 INTERNAL DERANGEMENT OF KNEE, UNSPECIFIED LATERALITY: ICD-10-CM

## 2024-05-09 DIAGNOSIS — M17.10 ARTHRITIS OF KNEE: Primary | ICD-10-CM

## 2024-05-09 PROCEDURE — RXMED WILLOW AMBULATORY MEDICATION CHARGE

## 2024-05-09 PROCEDURE — 20610 DRAIN/INJ JOINT/BURSA W/O US: CPT | Performed by: ORTHOPAEDIC SURGERY

## 2024-05-09 PROCEDURE — 3008F BODY MASS INDEX DOCD: CPT | Performed by: ORTHOPAEDIC SURGERY

## 2024-05-09 RX ORDER — METHYLPREDNISOLONE ACETATE 40 MG/ML
40 INJECTION, SUSPENSION INTRA-ARTICULAR; INTRALESIONAL; INTRAMUSCULAR; SOFT TISSUE
Status: COMPLETED | OUTPATIENT
Start: 2024-05-09 | End: 2024-05-09

## 2024-05-09 RX ORDER — LIDOCAINE HYDROCHLORIDE 20 MG/ML
2 INJECTION, SOLUTION INFILTRATION; PERINEURAL
Status: COMPLETED | OUTPATIENT
Start: 2024-05-09 | End: 2024-05-09

## 2024-05-09 RX ADMIN — LIDOCAINE HYDROCHLORIDE 2 ML: 20 INJECTION, SOLUTION INFILTRATION; PERINEURAL at 16:19

## 2024-05-09 RX ADMIN — METHYLPREDNISOLONE ACETATE 40 MG: 40 INJECTION, SUSPENSION INTRA-ARTICULAR; INTRALESIONAL; INTRAMUSCULAR; SOFT TISSUE at 16:19

## 2024-05-09 NOTE — PROGRESS NOTES
Returns for right knee.  Saw Dr. Ibanez.  Discussed possible knee arthroscopy.  Cannot take the time off for surgery due to her son in school and her mother being sick.  Requesting cortisone injection.    Exam: Tender medial joint line right knee.  Obvious effusion.  Tender patellofemoral joint.    I personally reviewed the following radiographic exams: Previous x-rays right knee shows moderate tricompartmental arthritis.    Assessment: Right knee arthrosis/internal derangement.    Plan: Discussed nonoperative and operative options in detail.   Risk and benefits discussed in detail. All questions answered today.  Recovery timeline and expectations discussed in detail.  Offered cortisone injection.  Understands that arthroscopy would be delayed at least 3 months after an injection if this does not help as much as she desires.  Usually gets pretty good relief from cortisone injection.  Discussed possible knee replacement in the future.  After informed consent under sterile conditions the right knee was injected with a combination of 2cc 2% lidocaine and 40mg Methylprednisolone. Risks and benefits were discussed in detail.  Patient ID: Kandi Bliss is a 54 y.o. female.    L Inj/Asp: R knee on 5/9/2024 4:19 PM  Indications: pain, joint swelling and diagnostic evaluation  Details: 21 G needle, anterolateral approach  Medications: 40 mg methylPREDNISolone acetate 40 mg/mL; 2 mL lidocaine 20 mg/mL (2 %)  Outcome: tolerated well, no immediate complications  Procedure, treatment alternatives, risks and benefits explained, specific risks discussed. Consent was given by the patient. Immediately prior to procedure a time out was called to verify the correct patient, procedure, equipment, support staff and site/side marked as required. Patient was prepped and draped in the usual sterile fashion.

## 2024-05-13 ENCOUNTER — SPECIALTY PHARMACY (OUTPATIENT)
Dept: PHARMACY | Facility: CLINIC | Age: 54
End: 2024-05-13

## 2024-05-15 ENCOUNTER — APPOINTMENT (OUTPATIENT)
Dept: INTEGRATIVE MEDICINE | Facility: CLINIC | Age: 54
End: 2024-05-15
Payer: COMMERCIAL

## 2024-05-16 ENCOUNTER — LAB (OUTPATIENT)
Dept: LAB | Facility: LAB | Age: 54
End: 2024-05-16
Payer: COMMERCIAL

## 2024-05-16 ENCOUNTER — OFFICE VISIT (OUTPATIENT)
Dept: PRIMARY CARE | Facility: CLINIC | Age: 54
End: 2024-05-16
Payer: COMMERCIAL

## 2024-05-16 VITALS — DIASTOLIC BLOOD PRESSURE: 72 MMHG | BODY MASS INDEX: 41.79 KG/M2 | WEIGHT: 214 LBS | SYSTOLIC BLOOD PRESSURE: 122 MMHG

## 2024-05-16 DIAGNOSIS — M25.562 LEFT KNEE PAIN, UNSPECIFIED CHRONICITY: ICD-10-CM

## 2024-05-16 DIAGNOSIS — K21.9 GASTROESOPHAGEAL REFLUX DISEASE, UNSPECIFIED WHETHER ESOPHAGITIS PRESENT: ICD-10-CM

## 2024-05-16 DIAGNOSIS — R10.31 RIGHT LOWER QUADRANT ABDOMINAL PAIN: ICD-10-CM

## 2024-05-16 DIAGNOSIS — R10.31 RIGHT LOWER QUADRANT ABDOMINAL PAIN: Primary | ICD-10-CM

## 2024-05-16 DIAGNOSIS — L98.9 BACK SKIN LESION: ICD-10-CM

## 2024-05-16 DIAGNOSIS — Z12.31 SCREENING MAMMOGRAM FOR BREAST CANCER: ICD-10-CM

## 2024-05-16 DIAGNOSIS — E78.5 HYPERLIPIDEMIA, UNSPECIFIED HYPERLIPIDEMIA TYPE: ICD-10-CM

## 2024-05-16 DIAGNOSIS — L40.50 ARTHROPATHIC PSORIASIS, UNSPECIFIED (MULTI): ICD-10-CM

## 2024-05-16 DIAGNOSIS — F10.20 MODERATE ALCOHOL DEPENDENCE (MULTI): ICD-10-CM

## 2024-05-16 DIAGNOSIS — R39.9 UTI SYMPTOMS: ICD-10-CM

## 2024-05-16 LAB
ALBUMIN SERPL BCP-MCNC: 4.7 G/DL (ref 3.4–5)
ALP SERPL-CCNC: 86 U/L (ref 33–110)
ALT SERPL W P-5'-P-CCNC: 24 U/L (ref 7–45)
ANION GAP SERPL CALC-SCNC: 13 MMOL/L (ref 10–20)
APPEARANCE UR: ABNORMAL
AST SERPL W P-5'-P-CCNC: 16 U/L (ref 9–39)
BASOPHILS # BLD AUTO: 0.1 X10*3/UL (ref 0–0.1)
BASOPHILS NFR BLD AUTO: 0.7 %
BILIRUB SERPL-MCNC: 0.3 MG/DL (ref 0–1.2)
BILIRUB UR STRIP.AUTO-MCNC: NEGATIVE MG/DL
BUN SERPL-MCNC: 17 MG/DL (ref 6–23)
CALCIUM SERPL-MCNC: 10.1 MG/DL (ref 8.6–10.6)
CHLORIDE SERPL-SCNC: 104 MMOL/L (ref 98–107)
CHOLEST SERPL-MCNC: 203 MG/DL (ref 0–199)
CHOLESTEROL/HDL RATIO: 5
CO2 SERPL-SCNC: 28 MMOL/L (ref 21–32)
COLOR UR: ABNORMAL
CREAT SERPL-MCNC: 0.68 MG/DL (ref 0.5–1.05)
EGFRCR SERPLBLD CKD-EPI 2021: >90 ML/MIN/1.73M*2
EOSINOPHIL # BLD AUTO: 0.39 X10*3/UL (ref 0–0.7)
EOSINOPHIL NFR BLD AUTO: 2.9 %
ERYTHROCYTE [DISTWIDTH] IN BLOOD BY AUTOMATED COUNT: 13.6 % (ref 11.5–14.5)
GLUCOSE SERPL-MCNC: 91 MG/DL (ref 74–99)
GLUCOSE UR STRIP.AUTO-MCNC: NORMAL MG/DL
HCT VFR BLD AUTO: 43.1 % (ref 36–46)
HDLC SERPL-MCNC: 40.6 MG/DL
HGB BLD-MCNC: 13.9 G/DL (ref 12–16)
IMM GRANULOCYTES # BLD AUTO: 0.06 X10*3/UL (ref 0–0.7)
IMM GRANULOCYTES NFR BLD AUTO: 0.4 % (ref 0–0.9)
KETONES UR STRIP.AUTO-MCNC: NEGATIVE MG/DL
LDLC SERPL CALC-MCNC: 109 MG/DL
LEUKOCYTE ESTERASE UR QL STRIP.AUTO: ABNORMAL
LYMPHOCYTES # BLD AUTO: 3.99 X10*3/UL (ref 1.2–4.8)
LYMPHOCYTES NFR BLD AUTO: 29.6 %
MCH RBC QN AUTO: 30.4 PG (ref 26–34)
MCHC RBC AUTO-ENTMCNC: 32.3 G/DL (ref 32–36)
MCV RBC AUTO: 94 FL (ref 80–100)
MONOCYTES # BLD AUTO: 0.96 X10*3/UL (ref 0.1–1)
MONOCYTES NFR BLD AUTO: 7.1 %
MUCOUS THREADS #/AREA URNS AUTO: ABNORMAL /LPF
NEUTROPHILS # BLD AUTO: 7.98 X10*3/UL (ref 1.2–7.7)
NEUTROPHILS NFR BLD AUTO: 59.3 %
NITRITE UR QL STRIP.AUTO: NEGATIVE
NON HDL CHOLESTEROL: 162 MG/DL (ref 0–149)
NRBC BLD-RTO: 0 /100 WBCS (ref 0–0)
PH UR STRIP.AUTO: 5 [PH]
PLATELET # BLD AUTO: 404 X10*3/UL (ref 150–450)
POTASSIUM SERPL-SCNC: 4.2 MMOL/L (ref 3.5–5.3)
PROT SERPL-MCNC: 7.4 G/DL (ref 6.4–8.2)
PROT UR STRIP.AUTO-MCNC: NEGATIVE MG/DL
RBC # BLD AUTO: 4.57 X10*6/UL (ref 4–5.2)
RBC # UR STRIP.AUTO: ABNORMAL /UL
RBC #/AREA URNS AUTO: >20 /HPF
SODIUM SERPL-SCNC: 141 MMOL/L (ref 136–145)
SP GR UR STRIP.AUTO: 1.02
SQUAMOUS #/AREA URNS AUTO: ABNORMAL /HPF
TRIGL SERPL-MCNC: 268 MG/DL (ref 0–149)
UROBILINOGEN UR STRIP.AUTO-MCNC: NORMAL MG/DL
VLDL: 54 MG/DL (ref 0–40)
WBC # BLD AUTO: 13.5 X10*3/UL (ref 4.4–11.3)
WBC #/AREA URNS AUTO: ABNORMAL /HPF

## 2024-05-16 PROCEDURE — 85025 COMPLETE CBC W/AUTO DIFF WBC: CPT

## 2024-05-16 PROCEDURE — 3008F BODY MASS INDEX DOCD: CPT | Performed by: INTERNAL MEDICINE

## 2024-05-16 PROCEDURE — 99214 OFFICE O/P EST MOD 30 MIN: CPT | Performed by: INTERNAL MEDICINE

## 2024-05-16 PROCEDURE — 80053 COMPREHEN METABOLIC PANEL: CPT

## 2024-05-16 PROCEDURE — 80061 LIPID PANEL: CPT

## 2024-05-16 PROCEDURE — 36415 COLL VENOUS BLD VENIPUNCTURE: CPT

## 2024-05-16 PROCEDURE — 81001 URINALYSIS AUTO W/SCOPE: CPT

## 2024-05-16 ASSESSMENT — ENCOUNTER SYMPTOMS
DEPRESSION: 1
OCCASIONAL FEELINGS OF UNSTEADINESS: 0
LOSS OF SENSATION IN FEET: 0

## 2024-05-16 ASSESSMENT — COLUMBIA-SUICIDE SEVERITY RATING SCALE - C-SSRS
2. HAVE YOU ACTUALLY HAD ANY THOUGHTS OF KILLING YOURSELF?: NO
1. IN THE PAST MONTH, HAVE YOU WISHED YOU WERE DEAD OR WISHED YOU COULD GO TO SLEEP AND NOT WAKE UP?: NO
6. HAVE YOU EVER DONE ANYTHING, STARTED TO DO ANYTHING, OR PREPARED TO DO ANYTHING TO END YOUR LIFE?: NO

## 2024-05-16 ASSESSMENT — PATIENT HEALTH QUESTIONNAIRE - PHQ9
SUM OF ALL RESPONSES TO PHQ9 QUESTIONS 1 AND 2: 1
2. FEELING DOWN, DEPRESSED OR HOPELESS: NOT AT ALL
1. LITTLE INTEREST OR PLEASURE IN DOING THINGS: SEVERAL DAYS

## 2024-05-16 NOTE — PROGRESS NOTES
Subjective   Patient ID: Kandi Bliss is a 54 y.o. female who presents for EPV, Abdominal Pain, and Cyst (Check cyst on back).    HPI  Patient in for a visit  Stressed out, smoking still, her mother is going through chemo tx at a rehab/nursing , was there for the broken arm now she is [place] on [address]  , angiosarcoma   Knee surgery in her future , just got an injection Dr Nguyen   Lower right abdominal just started two weeks ago sharp jst there   She has had aly unilat oopherectomy   Pain under the skin left side   Review of Systems  General: Denies fever, chills, night sweats,  Eyes: Negative for recent visual changes  Ears, Nose, Throat :  Negative for hearing changes, sinus discomfort  Dermatologic: Negative for new skin conditions, rash  Respiratory: Negative for wheezing, shortness of breath, cough  Cardiovascular: Negative for chest pain, palpitations, or leg swelling  Gastrointestinal: Negative for nausea/vomiting, abdominal pain, changes in bowel habits  Genitourinary NEGATIVE FOR URINARY INCONTINENCE urgency , frequency, discomfort   Musculoskeletal: see hpi  Neurological: Negative for headaches, dizziness    Previous history  Past Medical History:   Diagnosis Date    Abdominal distension (gaseous) 02/04/2021    Bloating    Abnormal findings on diagnostic imaging of other specified body structures 12/23/2021    Abnormal ultrasound of pelvis    Acute candidiasis of vulva and vagina 10/13/2016    Yeast infection of the vagina    Acute gastric ulcer without hemorrhage or perforation 05/13/2016    Acute gastric ulcer    Acute stress reaction 12/20/2018    Acute reaction to stress    Acute upper respiratory infection, unspecified 09/26/2017    Acute upper respiratory infection    Chronic rhinitis 10/08/2018    Rhinitis    Dermatitis, unspecified 10/29/2016    Eczema    Diarrhea, unspecified 11/16/2020    Diarrhea    Dietary counseling and surveillance     Pre-bariatric surgery nutrition evaluation     Disorder of white blood cells, unspecified 09/22/2022    Abnormal white blood cell    Dorsalgia, unspecified 12/10/2021    Back pain    Dyspnea, unspecified 03/13/2020    Dyspnea    Encounter for other screening for malignant neoplasm of breast 12/13/2021    Breast screening    Encounter for screening for malignant neoplasm of colon 06/05/2021    Colon cancer screening    Epigastric pain 05/23/2016    Abdominal pain, epigastric    Frequency of micturition 01/10/2019    Urinary frequency    Headache, unspecified 11/16/2020    Headache    Hyperuricemia without signs of inflammatory arthritis and tophaceous disease 09/22/2022    Hyperuricemia    Influenza due to unidentified influenza virus with other respiratory manifestations 03/10/2020    Influenza    Other conditions influencing health status 11/16/2020    Sweating    Other disturbances of skin sensation 09/06/2018    Skin pain    Other injury of unspecified body region, initial encounter 10/19/2022    Muscle strain    Other malaise 03/24/2022    Malaise and fatigue    Other specified symptoms and signs involving the circulatory and respiratory systems 10/02/2020    Runny nose    Pain in right hand 11/03/2016    Right hand pain    Pain in right shoulder 12/16/2020    Bilateral shoulder pain    Pain in right shoulder 06/13/2018    Right shoulder pain    Pain in unspecified joint 03/18/2020    Joint pain    Pain in unspecified knee 06/02/2015    Acute knee pain    Palpitations 03/13/2020    Palpitations    Paresthesia of skin 05/30/2019    Paresthesia    Periapical abscess without sinus 04/27/2022    Tooth infection    Personal history of other diseases of the female genital tract 01/22/2019    History of ovarian cyst    Personal history of other diseases of the nervous system and sense organs 01/03/2020    History of acute conjunctivitis    Personal history of other diseases of the nervous system and sense organs 12/27/2018    History of acute otitis media     Personal history of other diseases of the respiratory system 01/24/2017    History of acute bronchitis    Personal history of other diseases of the respiratory system 10/21/2015    History of acute bronchitis    Personal history of other infectious and parasitic diseases 10/13/2016    History of trichomonal vaginitis    Personal history of peptic ulcer disease 04/26/2021    History of gastric ulcer    Rash and other nonspecific skin eruption 09/26/2016    Rash    Rash and other nonspecific skin eruption 08/05/2015    Rash    Sleep disorder, unspecified 11/16/2020    Sleep disturbance    Unspecified abdominal pain 06/05/2021    Abdominal pain    Unspecified acute and subacute iridocyclitis 11/20/2017    Acute iritis    Unspecified disturbances of skin sensation 09/07/2016    Paresthesias/numbness    Unspecified iridocyclitis 01/19/2018    Traumatic iritis    Unspecified otitis externa, unspecified ear 01/26/2016    Otitis externa    Unspecified visual field defects 01/19/2018    Unspecified visual field defects    Unspecified visual field defects 01/19/2018    Unspecified visual field defects     Past Surgical History:   Procedure Laterality Date    HYSTERECTOMY  05/26/2015    Hysterectomy    MR HEAD ANGIO WO IV CONTRAST  11/27/2018    MR HEAD ANGIO WO IV CONTRAST 11/27/2018 [facility]    MR NECK ANGIO WO IV CONTRAST  11/27/2018    MR NECK ANGIO WO IV CONTRAST 11/27/2018 [facility]    OTHER SURGICAL HISTORY  07/26/2019    Laparoscopic salpingo-oophorectomy    OTHER SURGICAL HISTORY  03/22/2019    Laparoscopy    TUBAL LIGATION  05/26/2015    Tubal Ligation     Social History     Tobacco Use    Smoking status: Every Day     Current packs/day: 0.50     Types: Cigarettes    Smokeless tobacco: Never   Vaping Use    Vaping status: Never Used   Substance Use Topics    Alcohol use: Yes     Alcohol/week: 6.0 standard drinks of alcohol     Types: 6 Cans of beer per week    Drug use: Never     No family  history on file.  Allergies   Allergen Reactions    Avelox [Moxifloxacin] Diarrhea    Benzonatate Unknown     PT'S DOESN'T REMEMBER    Oxycodone Itching    Penicillins Hives    Bacitracin Rash    Latex Itching and Rash     Current Outpatient Medications   Medication Instructions    ALPRAZolam (XANAX) 0.5 mg, oral, 2 times daily, Prn anxiety    atorvastatin (LIPITOR) 40 mg, oral, Daily    azelastine (Astelin) 137 mcg (0.1 %) nasal spray 1 spray, Each Nostril, 2 times daily, Use in each nostril as directed    busPIRone (BUSPAR) 5 mg, oral, 2 times daily    dupilumab (Dupixent) 300 mg/2 mL syringe injection INJECT 300 MG (1 SYRINGE) UNDER THE SKIN EVERY OTHER WEEK, AS DIRECTED    ergocalciferol (VITAMIN D-2) 1,250 mcg, oral, Once Weekly    ergocalciferol (VITAMIN D-2) 1.25 mg, oral, Once Weekly    famotidine (PEPCID) 40 mg, oral, 2 times daily    fluticasone (Flonase) 50 mcg/actuation nasal spray 2 sprays, Each Nostril, Daily, Shake gently. Before first use, prime pump. After use, clean tip and replace cap.    meloxicam (MOBIC) 15 mg, oral, Daily    zolpidem (AMBIEN) 10 mg, oral, Nightly PRN       Objective       Physical Exam  Vital Signs: as recorded above  General: Well groomed, well nourished   Orientation:  Alert , oriented to time, place , and person   Mood and Affect:  Cooperative , no apparent distress normal affect  Skin: Good color, good turgor  left paraspinal area 2 in x 2 1/2 in moderately firm consistency non tender   Eyes: Extra ocular muscle movements intact, anicteric sclerae  Neck: Supple, full range of movement  Chest: Normal breath sounds, normal chest wall exam, symmetric, good air entry, clear to auscultation  Heart: Regular rate and rhythm, without murmur, gallop, or rubs  Abdomen soft nontender no masses felt no hepatosplenomegaly, no rebound or guarding  BACK:  no CTLS spine tenderness, no flank tenderness  Extremities: full range of movement  bilateral UE and bilateral LE,  no lower extremity  edema  Neurological: Alert, oriented, cranial nerves II-XII grossly intact except for visual acuity  Sensation:  Intact   Gait: normal steady      Assessment/Plan   Kandi Bliss is a 54 y.o. female who presents for the concerns below:    Problem List Items Addressed This Visit    None    ABDOMINAL PAIN/REFLUX PLAN:  bland diet no spicy, oily, dairy, acidic i.e. orange juice, coffee, until pain is better  hold off on use of nsaids  if pain is worse will consider imaging studies  swithc to  / or start with PPI  hold off on certain medications i.e. disphosphonates like boniva, actonel or space out when meds are taken.  if not better will consult Gastroenterology if severe or associated with fever, vomiting, bloody stool proceed to the emergency dept    Back lesion causing discomfort when pressure placed on it with massage therapy for back pain, slightly      TOBACCO USE DISORDER PLAN:  Counseled on strategies to help quit smoking.  Do not buy favorite cigarettes . Stress relief      Discussed with:   Return in :    Portions of this note were generated using digital voice recognition software, and may contain grammatical errors       Kaleb Barnett MD  05/16/24  2:54 PM

## 2024-05-16 NOTE — PATIENT INSTRUCTIONS
Restart your famotidine to help keep the acidity under control and avoid ulcers     We will do a urinalysis check and do an ultrasound of the pelvis to check on the pain in the right lower pain   Get an appointment with DR Napoles     For the back , we can see DR Tavarez     Please cut back on smoking do not buy your favorite cigarettes   Cut back on your alcohol intake     Try 1/2 tablet of the buspirone once a day initially

## 2024-05-17 ENCOUNTER — TELEPHONE (OUTPATIENT)
Dept: PRIMARY CARE | Facility: CLINIC | Age: 54
End: 2024-05-17
Payer: COMMERCIAL

## 2024-05-17 DIAGNOSIS — R82.90 ABNORMAL URINE FINDINGS: Primary | ICD-10-CM

## 2024-05-17 RX ORDER — NITROFURANTOIN 25; 75 MG/1; MG/1
100 CAPSULE ORAL 2 TIMES DAILY
Qty: 14 CAPSULE | Refills: 0 | Status: SHIPPED | OUTPATIENT
Start: 2024-05-17 | End: 2024-05-24

## 2024-05-20 ENCOUNTER — HOSPITAL ENCOUNTER (OUTPATIENT)
Dept: RADIOLOGY | Facility: CLINIC | Age: 54
Discharge: HOME | End: 2024-05-20
Payer: COMMERCIAL

## 2024-05-20 ENCOUNTER — TELEPHONE (OUTPATIENT)
Dept: PRIMARY CARE | Facility: CLINIC | Age: 54
End: 2024-05-20
Payer: COMMERCIAL

## 2024-05-20 DIAGNOSIS — R10.31 RIGHT LOWER QUADRANT ABDOMINAL PAIN: ICD-10-CM

## 2024-05-20 PROCEDURE — 76856 US EXAM PELVIC COMPLETE: CPT | Performed by: STUDENT IN AN ORGANIZED HEALTH CARE EDUCATION/TRAINING PROGRAM

## 2024-05-20 PROCEDURE — 76830 TRANSVAGINAL US NON-OB: CPT | Performed by: STUDENT IN AN ORGANIZED HEALTH CARE EDUCATION/TRAINING PROGRAM

## 2024-05-20 PROCEDURE — 76856 US EXAM PELVIC COMPLETE: CPT

## 2024-05-20 NOTE — TELEPHONE ENCOUNTER
Pt would like results of blood work - states an abx was called in but she doesn't know what the results are.     Also wants to know what all of her cysts are from in her abdomen.  She doesn't know anything about them.  States the scan was from a couple of years ago.     Also wondering if she can have a liver ultrasound.

## 2024-05-21 ENCOUNTER — TELEPHONE (OUTPATIENT)
Dept: PRIMARY CARE | Facility: CLINIC | Age: 54
End: 2024-05-21
Payer: COMMERCIAL

## 2024-05-21 NOTE — TELEPHONE ENCOUNTER
Pt called this morning stating that the medicine you put her on is making her sick, its affecting her liver, she has insomnia, she has chills, etc.  She states she has all the side effects of the medication.   She states she can't take the med anymore.

## 2024-05-22 LAB — COTININE UR QL SCN: POSITIVE

## 2024-05-29 ENCOUNTER — LAB (OUTPATIENT)
Dept: LAB | Facility: LAB | Age: 54
End: 2024-05-29
Payer: COMMERCIAL

## 2024-05-29 ENCOUNTER — APPOINTMENT (OUTPATIENT)
Dept: RADIOLOGY | Facility: CLINIC | Age: 54
End: 2024-05-29
Payer: COMMERCIAL

## 2024-05-29 ENCOUNTER — APPOINTMENT (OUTPATIENT)
Dept: RADIOLOGY | Facility: HOSPITAL | Age: 54
End: 2024-05-29
Payer: COMMERCIAL

## 2024-05-29 DIAGNOSIS — E78.5 HYPERLIPIDEMIA, UNSPECIFIED HYPERLIPIDEMIA TYPE: ICD-10-CM

## 2024-05-29 DIAGNOSIS — R10.31 RIGHT LOWER QUADRANT ABDOMINAL PAIN: ICD-10-CM

## 2024-05-29 LAB
ALT SERPL W P-5'-P-CCNC: 30 U/L (ref 7–45)
APPEARANCE UR: CLEAR
BILIRUB UR STRIP.AUTO-MCNC: NEGATIVE MG/DL
COLOR UR: NORMAL
GLUCOSE UR STRIP.AUTO-MCNC: NORMAL MG/DL
KETONES UR STRIP.AUTO-MCNC: NEGATIVE MG/DL
LEUKOCYTE ESTERASE UR QL STRIP.AUTO: NEGATIVE
NITRITE UR QL STRIP.AUTO: NEGATIVE
PH UR STRIP.AUTO: 5.5 [PH]
PROT UR STRIP.AUTO-MCNC: NEGATIVE MG/DL
RBC # UR STRIP.AUTO: NEGATIVE /UL
SP GR UR STRIP.AUTO: 1.01
UROBILINOGEN UR STRIP.AUTO-MCNC: NORMAL MG/DL

## 2024-05-29 PROCEDURE — 36415 COLL VENOUS BLD VENIPUNCTURE: CPT

## 2024-05-29 PROCEDURE — 84460 ALANINE AMINO (ALT) (SGPT): CPT

## 2024-05-29 PROCEDURE — 81003 URINALYSIS AUTO W/O SCOPE: CPT

## 2024-05-30 ENCOUNTER — HOSPITAL ENCOUNTER (OUTPATIENT)
Dept: RADIOLOGY | Facility: CLINIC | Age: 54
Discharge: HOME | End: 2024-05-30
Payer: COMMERCIAL

## 2024-05-30 VITALS — BODY MASS INDEX: 42.03 KG/M2 | WEIGHT: 214.07 LBS | HEIGHT: 60 IN

## 2024-05-30 DIAGNOSIS — Z12.31 SCREENING MAMMOGRAM FOR BREAST CANCER: ICD-10-CM

## 2024-05-30 PROCEDURE — 77067 SCR MAMMO BI INCL CAD: CPT | Performed by: STUDENT IN AN ORGANIZED HEALTH CARE EDUCATION/TRAINING PROGRAM

## 2024-05-30 PROCEDURE — 77063 BREAST TOMOSYNTHESIS BI: CPT | Performed by: STUDENT IN AN ORGANIZED HEALTH CARE EDUCATION/TRAINING PROGRAM

## 2024-05-30 PROCEDURE — 77067 SCR MAMMO BI INCL CAD: CPT

## 2024-06-11 ENCOUNTER — SPECIALTY PHARMACY (OUTPATIENT)
Dept: PHARMACY | Facility: CLINIC | Age: 54
End: 2024-06-11

## 2024-06-11 ENCOUNTER — PHARMACY VISIT (OUTPATIENT)
Dept: PHARMACY | Facility: CLINIC | Age: 54
End: 2024-06-11
Payer: COMMERCIAL

## 2024-06-11 PROCEDURE — RXMED WILLOW AMBULATORY MEDICATION CHARGE

## 2024-06-12 ENCOUNTER — APPOINTMENT (OUTPATIENT)
Dept: SURGERY | Facility: CLINIC | Age: 54
End: 2024-06-12
Payer: COMMERCIAL

## 2024-06-14 ENCOUNTER — APPOINTMENT (OUTPATIENT)
Dept: OBSTETRICS AND GYNECOLOGY | Facility: CLINIC | Age: 54
End: 2024-06-14
Payer: COMMERCIAL

## 2024-06-15 DIAGNOSIS — G47.00 INSOMNIA, UNSPECIFIED: ICD-10-CM

## 2024-06-17 RX ORDER — ZOLPIDEM TARTRATE 10 MG/1
10 TABLET ORAL NIGHTLY PRN
Qty: 30 TABLET | Refills: 1 | Status: SHIPPED | OUTPATIENT
Start: 2024-06-17

## 2024-06-20 ENCOUNTER — TELEPHONE (OUTPATIENT)
Dept: PRIMARY CARE | Facility: CLINIC | Age: 54
End: 2024-06-20
Payer: COMMERCIAL

## 2024-06-20 DIAGNOSIS — R05.1 ACUTE COUGH: Primary | ICD-10-CM

## 2024-06-20 RX ORDER — HYDROCODONE BITARTRATE AND HOMATROPINE METHYLBROMIDE ORAL SOLUTION 5; 1.5 MG/5ML; MG/5ML
5 LIQUID ORAL NIGHTLY PRN
Qty: 70 ML | Refills: 0 | Status: SHIPPED | OUTPATIENT
Start: 2024-06-20 | End: 2024-06-27

## 2024-06-20 NOTE — TELEPHONE ENCOUNTER
Pts son got her sick with his cold.  She states symptoms started yesterday.  Runny nose, cough, itchy throat, fatigue.     She is requesting cough medicine and a Zpack called in to  [facility] [place].

## 2024-06-25 DIAGNOSIS — M17.10 ARTHRITIS OF KNEE: ICD-10-CM

## 2024-06-25 DIAGNOSIS — M17.12 PRIMARY OSTEOARTHRITIS OF LEFT KNEE: ICD-10-CM

## 2024-07-02 ENCOUNTER — APPOINTMENT (OUTPATIENT)
Dept: RADIOLOGY | Facility: HOSPITAL | Age: 54
End: 2024-07-02
Payer: COMMERCIAL

## 2024-07-02 ENCOUNTER — HOSPITAL ENCOUNTER (EMERGENCY)
Facility: HOSPITAL | Age: 54
Discharge: HOME | End: 2024-07-02
Payer: COMMERCIAL

## 2024-07-02 VITALS
OXYGEN SATURATION: 100 % | HEART RATE: 78 BPM | SYSTOLIC BLOOD PRESSURE: 140 MMHG | DIASTOLIC BLOOD PRESSURE: 82 MMHG | TEMPERATURE: 96.9 F | HEIGHT: 60 IN | BODY MASS INDEX: 40.25 KG/M2 | RESPIRATION RATE: 16 BRPM | WEIGHT: 205 LBS

## 2024-07-02 DIAGNOSIS — M23.91 INTERNAL DERANGEMENT OF RIGHT KNEE: Primary | ICD-10-CM

## 2024-07-02 PROCEDURE — 96372 THER/PROPH/DIAG INJ SC/IM: CPT | Performed by: PHYSICIAN ASSISTANT

## 2024-07-02 PROCEDURE — 2500000004 HC RX 250 GENERAL PHARMACY W/ HCPCS (ALT 636 FOR OP/ED): Performed by: PHYSICIAN ASSISTANT

## 2024-07-02 PROCEDURE — 99283 EMERGENCY DEPT VISIT LOW MDM: CPT

## 2024-07-02 PROCEDURE — 73562 X-RAY EXAM OF KNEE 3: CPT | Mod: RIGHT SIDE | Performed by: RADIOLOGY

## 2024-07-02 PROCEDURE — 73562 X-RAY EXAM OF KNEE 3: CPT | Mod: RT

## 2024-07-02 RX ORDER — KETOROLAC TROMETHAMINE 30 MG/ML
30 INJECTION, SOLUTION INTRAMUSCULAR; INTRAVENOUS ONCE
Status: DISCONTINUED | OUTPATIENT
Start: 2024-07-02 | End: 2024-07-02

## 2024-07-02 RX ORDER — DICLOFENAC SODIUM 10 MG/G
4 GEL TOPICAL 3 TIMES DAILY PRN
Qty: 50 G | Refills: 0 | Status: SHIPPED | OUTPATIENT
Start: 2024-07-02 | End: 2024-07-12

## 2024-07-02 RX ORDER — ACETAMINOPHEN 325 MG/1
975 TABLET ORAL ONCE
Status: COMPLETED | OUTPATIENT
Start: 2024-07-02 | End: 2024-07-02

## 2024-07-02 RX ORDER — NAPROXEN 500 MG/1
500 TABLET ORAL
Qty: 20 TABLET | Refills: 0 | Status: SHIPPED | OUTPATIENT
Start: 2024-07-02 | End: 2024-07-12

## 2024-07-02 RX ORDER — KETOROLAC TROMETHAMINE 30 MG/ML
15 INJECTION, SOLUTION INTRAMUSCULAR; INTRAVENOUS ONCE
Status: COMPLETED | OUTPATIENT
Start: 2024-07-02 | End: 2024-07-02

## 2024-07-02 ASSESSMENT — PAIN DESCRIPTION - ORIENTATION: ORIENTATION: RIGHT;LEFT

## 2024-07-02 ASSESSMENT — PAIN SCALES - GENERAL
PAINLEVEL_OUTOF10: 3
PAINLEVEL_OUTOF10: 9

## 2024-07-02 ASSESSMENT — PAIN DESCRIPTION - DESCRIPTORS: DESCRIPTORS: ACHING

## 2024-07-02 ASSESSMENT — PAIN DESCRIPTION - PAIN TYPE
TYPE: ACUTE PAIN
TYPE: ACUTE PAIN

## 2024-07-02 ASSESSMENT — PAIN - FUNCTIONAL ASSESSMENT
PAIN_FUNCTIONAL_ASSESSMENT: 0-10
PAIN_FUNCTIONAL_ASSESSMENT: 0-10

## 2024-07-02 ASSESSMENT — PAIN DESCRIPTION - LOCATION
LOCATION: LEG
LOCATION: KNEE

## 2024-07-02 NOTE — ED PROVIDER NOTES
"HPI   Chief Complaint   Patient presents with    Knee Pain       54-year-old female with chronic knee pain secondary to \"bone-on-bone arthritis\".  Plans are for knee replacement not yet scheduled.  Has had prior steroid injections.  Today she was sitting at a desk and when she got up and moved to knee pain became worse she felt something pop.  There is no fall or impact injury.  Pain is aggravated movement and weightbearing.  No pain above or below the knee.  She took Advil at 1045 this morning.  No relief.      History provided by:  Patient   used: No                        Brilliant Coma Scale Score: 15                     Patient History   Past Medical History:   Diagnosis Date    Abdominal distension (gaseous) 02/04/2021    Bloating    Abnormal findings on diagnostic imaging of other specified body structures 12/23/2021    Abnormal ultrasound of pelvis    Acute candidiasis of vulva and vagina 10/13/2016    Yeast infection of the vagina    Acute gastric ulcer without hemorrhage or perforation 05/13/2016    Acute gastric ulcer    Acute stress reaction 12/20/2018    Acute reaction to stress    Acute upper respiratory infection, unspecified 09/26/2017    Acute upper respiratory infection    Chronic rhinitis 10/08/2018    Rhinitis    Dermatitis, unspecified 10/29/2016    Eczema    Diarrhea, unspecified 11/16/2020    Diarrhea    Dietary counseling and surveillance     Pre-bariatric surgery nutrition evaluation    Disorder of white blood cells, unspecified 09/22/2022    Abnormal white blood cell    Dorsalgia, unspecified 12/10/2021    Back pain    Dyspnea, unspecified 03/13/2020    Dyspnea    Encounter for other screening for malignant neoplasm of breast 12/13/2021    Breast screening    Encounter for screening for malignant neoplasm of colon 06/05/2021    Colon cancer screening    Epigastric pain 05/23/2016    Abdominal pain, epigastric    Frequency of micturition 01/10/2019    Urinary frequency    " Headache, unspecified 11/16/2020    Headache    Hyperuricemia without signs of inflammatory arthritis and tophaceous disease 09/22/2022    Hyperuricemia    Influenza due to unidentified influenza virus with other respiratory manifestations 03/10/2020    Influenza    Other conditions influencing health status 11/16/2020    Sweating    Other disturbances of skin sensation 09/06/2018    Skin pain    Other injury of unspecified body region, initial encounter 10/19/2022    Muscle strain    Other malaise 03/24/2022    Malaise and fatigue    Other specified symptoms and signs involving the circulatory and respiratory systems 10/02/2020    Runny nose    Pain in right hand 11/03/2016    Right hand pain    Pain in right shoulder 12/16/2020    Bilateral shoulder pain    Pain in right shoulder 06/13/2018    Right shoulder pain    Pain in unspecified joint 03/18/2020    Joint pain    Pain in unspecified knee 06/02/2015    Acute knee pain    Palpitations 03/13/2020    Palpitations    Paresthesia of skin 05/30/2019    Paresthesia    Periapical abscess without sinus 04/27/2022    Tooth infection    Personal history of other diseases of the female genital tract 01/22/2019    History of ovarian cyst    Personal history of other diseases of the nervous system and sense organs 01/03/2020    History of acute conjunctivitis    Personal history of other diseases of the nervous system and sense organs 12/27/2018    History of acute otitis media    Personal history of other diseases of the respiratory system 01/24/2017    History of acute bronchitis    Personal history of other diseases of the respiratory system 10/21/2015    History of acute bronchitis    Personal history of other infectious and parasitic diseases 10/13/2016    History of trichomonal vaginitis    Personal history of peptic ulcer disease 04/26/2021    History of gastric ulcer    Rash and other nonspecific skin eruption 09/26/2016    Rash    Rash and other nonspecific skin  eruption 08/05/2015    Rash    Sleep disorder, unspecified 11/16/2020    Sleep disturbance    Unspecified abdominal pain 06/05/2021    Abdominal pain    Unspecified acute and subacute iridocyclitis 11/20/2017    Acute iritis    Unspecified disturbances of skin sensation 09/07/2016    Paresthesias/numbness    Unspecified iridocyclitis 01/19/2018    Traumatic iritis    Unspecified otitis externa, unspecified ear 01/26/2016    Otitis externa    Unspecified visual field defects 01/19/2018    Unspecified visual field defects    Unspecified visual field defects 01/19/2018    Unspecified visual field defects     Past Surgical History:   Procedure Laterality Date    HYSTERECTOMY  05/26/2015    Hysterectomy    MR HEAD ANGIO WO IV CONTRAST  11/27/2018    MR HEAD ANGIO WO IV CONTRAST 11/27/2018 [facility]    MR NECK ANGIO WO IV CONTRAST  11/27/2018    MR NECK ANGIO WO IV CONTRAST 11/27/2018 [facility]    OTHER SURGICAL HISTORY  07/26/2019    Laparoscopic salpingo-oophorectomy    OTHER SURGICAL HISTORY  03/22/2019    Laparoscopy    TUBAL LIGATION  05/26/2015    Tubal Ligation     Family History   Problem Relation Name Age of Onset    Breast cancer Mother       Social History     Tobacco Use    Smoking status: Every Day     Current packs/day: 0.50     Types: Cigarettes    Smokeless tobacco: Never   Vaping Use    Vaping status: Never Used   Substance Use Topics    Alcohol use: Yes     Alcohol/week: 6.0 standard drinks of alcohol     Types: 6 Cans of beer per week    Drug use: Never       Physical Exam   ED Triage Vitals [07/02/24 1227]   Temperature Heart Rate Respirations BP   36.1 °C (96.9 °F) 73 17 (!) 134/96      Pulse Ox Temp Source Heart Rate Source Patient Position   100 % Temporal -- Sitting      BP Location FiO2 (%)     Left arm 21 %       Physical Exam  Vitals and nursing note reviewed.   Constitutional:       General: She is not in acute distress.     Appearance: Normal appearance. She is obese. She  is not ill-appearing, toxic-appearing or diaphoretic.   HENT:      Head: Normocephalic and atraumatic.   Musculoskeletal:         General: Swelling and tenderness present. No deformity or signs of injury.      Right lower leg: No edema.      Left lower leg: No edema.      Comments: Right knee: Joint is stable.  Mild prepatellar swelling.  No rash ecchymoses erythema or increased warmth.  Extensor mechanism intact.  She is able to partially flex the knee with pain.  There is no bony deformity.  Patella is mobile no crepitus or deformity.  No motor sense deficit.  Intact neurovascular exam.  Patient is afebrile   Skin:     General: Skin is warm and dry.      Findings: No bruising or erythema.   Neurological:      General: No focal deficit present.      Mental Status: She is alert and oriented to person, place, and time.   Psychiatric:         Mood and Affect: Mood normal.         Behavior: Behavior normal.         Thought Content: Thought content normal.         Judgment: Judgment normal.         ED Course & [facility]   ED Course as of 07/02/24 1428   Tue Jul 02, 2024   1408 Knee x-ray: No fracture.  DJD.  Loose bodies present. [GA]      ED Course User Index  [GA] Juno Unger PA-C         Diagnoses as of 07/02/24 1428   Internal derangement of right knee     XR knee right 3 views   Final Result   Degenerative change the right knee with loose bodies.   Signed by Saurav Lozoya MD         Medical Decision Making  Acute exacerbation chronic right knee pain.  Known degenerative arthritis with plans for total knee replacement.  Rule out occult fracture.  Likely meniscal injury or worsening degenerative disease.  Toradol IM Tylenol p.o.   1415: Pain controlled.  Feels better.  X-ray: No acute fracture.  Degenerative changes with loose bodies.  Knee immobilizer.  Splint applied by nursing staff.  Normal neurovascular exam.  Patient has cane.  Does not want walker or crutches.  She is ambulatory on discharge.  May use  Voltaren cream or Naprosyn.  Advised not to use both simultaneously.  Also use of Tylenol and lidocaine patches.  Her orthopedic physician she is already contacted arranging for an outpatient appointment.  Stable for discharge outpatient management.  Return to ED if any problems or condition worsening.      Amount and/or Complexity of Data Reviewed  External Data Reviewed: notes.     Details: Recent orthopedic office visit in May 2024.  Had cortisone injection.  Discussions of future arthroscopy and knee replacement.  Radiology: ordered. Decision-making details documented in ED Course.     Details: As above    Risk  Prescription drug management.        Procedure  Procedures     Juno Unger PA-C  07/02/24 2027

## 2024-07-08 ENCOUNTER — APPOINTMENT (OUTPATIENT)
Dept: OBSTETRICS AND GYNECOLOGY | Facility: CLINIC | Age: 54
End: 2024-07-08
Payer: COMMERCIAL

## 2024-07-10 ENCOUNTER — PHARMACY VISIT (OUTPATIENT)
Dept: PHARMACY | Facility: CLINIC | Age: 54
End: 2024-07-10
Payer: COMMERCIAL

## 2024-07-10 ENCOUNTER — SPECIALTY PHARMACY (OUTPATIENT)
Dept: PHARMACY | Facility: CLINIC | Age: 54
End: 2024-07-10

## 2024-07-10 PROCEDURE — RXMED WILLOW AMBULATORY MEDICATION CHARGE

## 2024-07-23 ENCOUNTER — TELEPHONE (OUTPATIENT)
Dept: DERMATOLOGY | Facility: CLINIC | Age: 54
End: 2024-07-23
Payer: COMMERCIAL

## 2024-07-23 NOTE — TELEPHONE ENCOUNTER
Patient called and said she is having side effects from her Dupixent. I called patient and spoke with her and asked her what side effects she is having. She said she is very stressed out because of her mothers recent diagnosis, her skin is flaking, and all of her joints are hurting her. She has been on Dupixent for a long time. Her follow up is 9/9/24. Dr. Tuttle is there anything you'd like her to do/prescribe? She has been using triamcinolone prn

## 2024-07-24 DIAGNOSIS — E78.00 PURE HYPERCHOLESTEROLEMIA, UNSPECIFIED: ICD-10-CM

## 2024-07-24 RX ORDER — ATORVASTATIN CALCIUM 40 MG/1
40 TABLET, FILM COATED ORAL DAILY
Qty: 90 TABLET | Refills: 0 | Status: SHIPPED | OUTPATIENT
Start: 2024-07-24

## 2024-07-30 DIAGNOSIS — L30.9 ECZEMA, UNSPECIFIED TYPE: ICD-10-CM

## 2024-07-30 RX ORDER — TRIAMCINOLONE ACETONIDE 1 MG/G
OINTMENT TOPICAL 2 TIMES DAILY
Qty: 120 G | Refills: 11 | Status: SHIPPED | OUTPATIENT
Start: 2024-07-30 | End: 2024-08-13

## 2024-08-01 ENCOUNTER — SPECIALTY PHARMACY (OUTPATIENT)
Dept: PHARMACY | Facility: CLINIC | Age: 54
End: 2024-08-01

## 2024-08-01 DIAGNOSIS — M17.10 ARTHRITIS OF KNEE: ICD-10-CM

## 2024-08-01 DIAGNOSIS — M17.10 ARTHRITIS OF KNEE: Primary | ICD-10-CM

## 2024-08-01 DIAGNOSIS — M23.90 INTERNAL DERANGEMENT OF KNEE, UNSPECIFIED LATERALITY: ICD-10-CM

## 2024-08-01 RX ORDER — HYALURONATE SODIUM, STABILIZED 60 MG/3 ML
60 SYRINGE (ML) INTRAARTICULAR ONCE
Qty: 6 ML | Refills: 0 | Status: SHIPPED | OUTPATIENT
Start: 2024-08-01 | End: 2024-08-03

## 2024-08-01 RX ORDER — HYALURONATE SODIUM, STABILIZED 60 MG/3 ML
60 SYRINGE (ML) INTRAARTICULAR ONCE
Qty: 2 ML | Refills: 0 | Status: SHIPPED | OUTPATIENT
Start: 2024-08-01 | End: 2024-08-01

## 2024-08-05 DIAGNOSIS — G47.00 INSOMNIA, UNSPECIFIED: ICD-10-CM

## 2024-08-05 RX ORDER — ZOLPIDEM TARTRATE 10 MG/1
10 TABLET ORAL NIGHTLY PRN
Qty: 30 TABLET | Refills: 1 | Status: SHIPPED | OUTPATIENT
Start: 2024-08-05

## 2024-08-06 ENCOUNTER — SPECIALTY PHARMACY (OUTPATIENT)
Dept: PHARMACY | Facility: CLINIC | Age: 54
End: 2024-08-06

## 2024-08-10 DIAGNOSIS — F43.21 GRIEVING: Primary | ICD-10-CM

## 2024-08-10 DIAGNOSIS — F41.9 ANXIETY DISORDER, UNSPECIFIED: ICD-10-CM

## 2024-08-10 RX ORDER — ALPRAZOLAM 0.5 MG/1
0.5 TABLET ORAL 2 TIMES DAILY
Qty: 60 TABLET | Refills: 0 | Status: SHIPPED | OUTPATIENT
Start: 2024-08-10

## 2024-08-12 ENCOUNTER — SPECIALTY PHARMACY (OUTPATIENT)
Dept: PHARMACY | Facility: CLINIC | Age: 54
End: 2024-08-12

## 2024-08-12 ENCOUNTER — APPOINTMENT (OUTPATIENT)
Dept: OBSTETRICS AND GYNECOLOGY | Facility: CLINIC | Age: 54
End: 2024-08-12
Payer: COMMERCIAL

## 2024-08-12 PROCEDURE — RXMED WILLOW AMBULATORY MEDICATION CHARGE

## 2024-08-13 ENCOUNTER — PHARMACY VISIT (OUTPATIENT)
Dept: PHARMACY | Facility: CLINIC | Age: 54
End: 2024-08-13
Payer: COMMERCIAL

## 2024-08-14 ENCOUNTER — APPOINTMENT (OUTPATIENT)
Dept: ORTHOPEDIC SURGERY | Facility: CLINIC | Age: 54
End: 2024-08-14
Payer: COMMERCIAL

## 2024-08-15 ENCOUNTER — OFFICE VISIT (OUTPATIENT)
Dept: ORTHOPEDIC SURGERY | Facility: CLINIC | Age: 54
End: 2024-08-15
Payer: COMMERCIAL

## 2024-08-15 DIAGNOSIS — M17.10 ARTHRITIS OF KNEE: Primary | ICD-10-CM

## 2024-08-15 PROCEDURE — 20610 DRAIN/INJ JOINT/BURSA W/O US: CPT | Mod: 50 | Performed by: ORTHOPAEDIC SURGERY

## 2024-08-15 PROCEDURE — 2500000004 HC RX 250 GENERAL PHARMACY W/ HCPCS (ALT 636 FOR OP/ED): Mod: JZ | Performed by: ORTHOPAEDIC SURGERY

## 2024-08-15 PROCEDURE — 99213 OFFICE O/P EST LOW 20 MIN: CPT | Performed by: ORTHOPAEDIC SURGERY

## 2024-08-15 NOTE — PROGRESS NOTES
Returns for both knees.  Here for Durolane injections.  Right knee is worse than left.    Exam: Unchanged.    Assessment: Bilateral knee arthrosis.    Plan: Discussed nonoperative and operative options in detail.   Risk and benefits discussed in detail. All questions answered today.  Recovery timeline and expectations discussed in detail.  Discussed expected response to the gel injections.  Discussed possible arthroscopy if pain continues.  After informed consent under sterile conditions the both knees were injected with 3 cc of Durolane. Risks and benefits were discussed in detail.    Patient ID: Kandi Bliss is a 54 y.o. female.    L Inj/Asp: bilateral knee on 8/15/2024 3:44 PM  Indications: pain  Details: 21 G needle, anterolateral approach  Medications (Right): 60 mg sodium hyaluronate 60 mg/3 mL  Medications (Left): 60 mg sodium hyaluronate 60 mg/3 mL  Outcome: tolerated well, no immediate complications  Procedure, treatment alternatives, risks and benefits explained, specific risks discussed. Consent was given by the patient. Immediately prior to procedure a time out was called to verify the correct patient, procedure, equipment, support staff and site/side marked as required. Patient was prepped and draped in the usual sterile fashion.

## 2024-08-20 ENCOUNTER — SPECIALTY PHARMACY (OUTPATIENT)
Dept: PHARMACY | Facility: CLINIC | Age: 54
End: 2024-08-20

## 2024-08-25 ENCOUNTER — SPECIALTY PHARMACY (OUTPATIENT)
Dept: PHARMACY | Facility: CLINIC | Age: 54
End: 2024-08-25

## 2024-08-26 ENCOUNTER — APPOINTMENT (OUTPATIENT)
Dept: PRIMARY CARE | Facility: CLINIC | Age: 54
End: 2024-08-26
Payer: COMMERCIAL

## 2024-08-26 DIAGNOSIS — G47.00 INSOMNIA, UNSPECIFIED TYPE: ICD-10-CM

## 2024-08-26 DIAGNOSIS — E66.01 CLASS 3 SEVERE OBESITY WITH BODY MASS INDEX (BMI) OF 40.0 TO 44.9 IN ADULT, UNSPECIFIED OBESITY TYPE, UNSPECIFIED WHETHER SERIOUS COMORBIDITY PRESENT (MULTI): Primary | ICD-10-CM

## 2024-08-26 DIAGNOSIS — F43.21 GRIEVING: ICD-10-CM

## 2024-08-26 DIAGNOSIS — F41.9 ANXIETY: ICD-10-CM

## 2024-08-26 PROCEDURE — 99214 OFFICE O/P EST MOD 30 MIN: CPT | Performed by: INTERNAL MEDICINE

## 2024-08-26 RX ORDER — TRAZODONE HYDROCHLORIDE 50 MG/1
50 TABLET ORAL NIGHTLY PRN
Qty: 30 TABLET | Refills: 1 | Status: SHIPPED | OUTPATIENT
Start: 2024-08-26 | End: 2025-08-26

## 2024-08-26 NOTE — PROGRESS NOTES
Subjective   Patient ID: Kandi Bliss is a 54 y.o. female who presents for No chief complaint on file..    HPI    I performed this visit using real-time telehealth tools, including an audio/video connection between Kandi Bliss and myself Dr Givens in office [facility], [place], [place]  Patient on with me on a virtual visit  She was not taking the buspirone she has the xanax yazan since her mother    Ambien is not working so will try trazodone in place   Review of Systems  General: Denies fever, chills, night sweats,  Eyes: Negative for recent visual changes  Ears, Nose, Throat :  Negative for hearing changes, sinus discomfort  Dermatologic: Negative for new skin conditions, rash  Respiratory: Negative for wheezing, shortness of breath, cough  Cardiovascular: Negative for chest pain, palpitations, or leg swelling  Gastrointestinal: Negative for nausea/vomiting, abdominal pain, changes in bowel habits  Genitourinary Negative for Urinary Incontinence  urgency , frequency, discomfort   Musculoskeletal: see hpi  Neurological: Negative for headaches, dizziness    Previous history  Past Medical History:   Diagnosis Date    Abdominal distension (gaseous) 2021    Bloating    Abnormal findings on diagnostic imaging of other specified body structures 2021    Abnormal ultrasound of pelvis    Acute candidiasis of vulva and vagina 10/13/2016    Yeast infection of the vagina    Acute gastric ulcer without hemorrhage or perforation 2016    Acute gastric ulcer    Acute stress reaction 2018    Acute reaction to stress    Acute upper respiratory infection, unspecified 2017    Acute upper respiratory infection    Chronic rhinitis 10/08/2018    Rhinitis    Dermatitis, unspecified 10/29/2016    Eczema    Diarrhea, unspecified 2020    Diarrhea    Dietary counseling and surveillance     Pre-bariatric surgery nutrition evaluation    Disorder of white blood cells,  unspecified 09/22/2022    Abnormal white blood cell    Dorsalgia, unspecified 12/10/2021    Back pain    Dyspnea, unspecified 03/13/2020    Dyspnea    Encounter for other screening for malignant neoplasm of breast 12/13/2021    Breast screening    Encounter for screening for malignant neoplasm of colon 06/05/2021    Colon cancer screening    Epigastric pain 05/23/2016    Abdominal pain, epigastric    Frequency of micturition 01/10/2019    Urinary frequency    Headache, unspecified 11/16/2020    Headache    Hyperuricemia without signs of inflammatory arthritis and tophaceous disease 09/22/2022    Hyperuricemia    Influenza due to unidentified influenza virus with other respiratory manifestations 03/10/2020    Influenza    Other conditions influencing health status 11/16/2020    Sweating    Other disturbances of skin sensation 09/06/2018    Skin pain    Other injury of unspecified body region, initial encounter 10/19/2022    Muscle strain    Other malaise 03/24/2022    Malaise and fatigue    Other specified symptoms and signs involving the circulatory and respiratory systems 10/02/2020    Runny nose    Pain in right hand 11/03/2016    Right hand pain    Pain in right shoulder 12/16/2020    Bilateral shoulder pain    Pain in right shoulder 06/13/2018    Right shoulder pain    Pain in unspecified joint 03/18/2020    Joint pain    Pain in unspecified knee 06/02/2015    Acute knee pain    Palpitations 03/13/2020    Palpitations    Paresthesia of skin 05/30/2019    Paresthesia    Periapical abscess without sinus 04/27/2022    Tooth infection    Personal history of other diseases of the female genital tract 01/22/2019    History of ovarian cyst    Personal history of other diseases of the nervous system and sense organs 01/03/2020    History of acute conjunctivitis    Personal history of other diseases of the nervous system and sense organs 12/27/2018    History of acute otitis media    Personal history of other diseases of  the respiratory system 01/24/2017    History of acute bronchitis    Personal history of other diseases of the respiratory system 10/21/2015    History of acute bronchitis    Personal history of other infectious and parasitic diseases 10/13/2016    History of trichomonal vaginitis    Personal history of peptic ulcer disease 04/26/2021    History of gastric ulcer    Rash and other nonspecific skin eruption 09/26/2016    Rash    Rash and other nonspecific skin eruption 08/05/2015    Rash    Sleep disorder, unspecified 11/16/2020    Sleep disturbance    Unspecified abdominal pain 06/05/2021    Abdominal pain    Unspecified acute and subacute iridocyclitis 11/20/2017    Acute iritis    Unspecified disturbances of skin sensation 09/07/2016    Paresthesias/numbness    Unspecified iridocyclitis 01/19/2018    Traumatic iritis    Unspecified otitis externa, unspecified ear 01/26/2016    Otitis externa    Unspecified visual field defects 01/19/2018    Unspecified visual field defects    Unspecified visual field defects 01/19/2018    Unspecified visual field defects     Past Surgical History:   Procedure Laterality Date    HYSTERECTOMY  05/26/2015    Hysterectomy    MR HEAD ANGIO WO IV CONTRAST  11/27/2018    MR HEAD ANGIO WO IV CONTRAST 11/27/2018 [facility]    MR NECK ANGIO WO IV CONTRAST  11/27/2018    MR NECK ANGIO WO IV CONTRAST 11/27/2018 [facility]    OTHER SURGICAL HISTORY  07/26/2019    Laparoscopic salpingo-oophorectomy    OTHER SURGICAL HISTORY  03/22/2019    Laparoscopy    TUBAL LIGATION  05/26/2015    Tubal Ligation     Social History     Tobacco Use    Smoking status: Every Day     Current packs/day: 0.50     Types: Cigarettes    Smokeless tobacco: Never   Vaping Use    Vaping status: Never Used   Substance Use Topics    Alcohol use: Yes     Alcohol/week: 6.0 standard drinks of alcohol     Types: 6 Cans of beer per week    Drug use: Never     Family History   Problem Relation Name Age of Onset     Breast cancer Mother       Allergies   Allergen Reactions    Avelox [Moxifloxacin] Diarrhea    Benzonatate Unknown     PT'S DOESN'T REMEMBER    Oxycodone Itching    Penicillins Hives    Bacitracin Rash    Latex Itching and Rash     Current Outpatient Medications   Medication Instructions    ALPRAZolam (XANAX) 0.5 mg, oral, 2 times daily, Prn anxiety    atorvastatin (LIPITOR) 40 mg, oral, Daily    azelastine (Astelin) 137 mcg (0.1 %) nasal spray 1 spray, Each Nostril, 2 times daily, Use in each nostril as directed    busPIRone (BUSPAR) 5 mg, oral, 2 times daily    dupilumab (Dupixent) 300 mg/2 mL prefilled syringe INJECT 300 MG (1 SYRINGE) UNDER THE SKIN EVERY OTHER WEEK, AS DIRECTED    ergocalciferol (VITAMIN D-2) 1,250 mcg, oral, Once Weekly    ergocalciferol (VITAMIN D-2) 1.25 mg, oral, Once Weekly    famotidine (PEPCID) 40 mg, oral, 2 times daily    fluticasone (Flonase) 50 mcg/actuation nasal spray 2 sprays, Each Nostril, Daily, Shake gently. Before first use, prime pump. After use, clean tip and replace cap.    zolpidem (AMBIEN) 10 mg, oral, Nightly PRN       Objective       Physical Exam  via [place]  General: Well groomed, well nourished , speaks full sentences  Alert Cooperative , no apparent distress   Skin: Good color does not appear dehydrated   Eyes: Extra ocular muscle movements intact, anicteric sclerae  Neck: Supple, with good range of motion looking behind her and moving head sideways to cough   Neurological: Alert, oriented        Assessment/Plan   Kandi Bliss is a 54 y.o. female who presents for the concerns below:    Problem List Items Addressed This Visit    None    INSOMNIA will try trazodone since ambien is not working  INSOMNIA / SLEEP DISTURBANCE PLAN:   sleep hygiene, minimize caffeine products, establish routine and set time to sleep.   OBESITY BMI 40 Will try rybelsius if covered , OBESITY PLAN: Proper sleep habits, increase water intake, we'll make sure no metabolic  problems weight reduction through a low calorie diet and  exercise preferred walking.  Benefits discussed with patient followup in one.  May consider consult with nutrition and endocrinology. If not done recently will recheck TSH, BMP           Discussed with:   Return in : 6 weeks sooner if needed    Portions of this note were generated using digital voice recognition software, and may contain grammatical errors       Kaleb Barnett MD  08/26/24  1:08 PM

## 2024-08-27 ENCOUNTER — TELEPHONE (OUTPATIENT)
Dept: PRIMARY CARE | Facility: CLINIC | Age: 54
End: 2024-08-27

## 2024-08-27 NOTE — TELEPHONE ENCOUNTER
Pt called again.  Requesting lab orders.  She had virtual appt with AQ last week.  Her weight loss med was denied.  Anything else you can send in?  LG to call back.  LG       Patient sent me the following message.      kirk jones can you check with dr hodges and see if I have take magnesium citrate to clean my sytem and also let her know the script was denied cause of dx -thank you

## 2024-08-30 DIAGNOSIS — E78.5 HYPERLIPIDEMIA, UNSPECIFIED HYPERLIPIDEMIA TYPE: ICD-10-CM

## 2024-08-30 DIAGNOSIS — E66.01 MORBID OBESITY WITH BMI OF 40.0-44.9, ADULT (MULTI): Primary | ICD-10-CM

## 2024-09-05 NOTE — TELEPHONE ENCOUNTER
Called pt to let her know it was sent to [facility]. She said that should be fine. Unsure if she is going to want it sent to  [facility] but she will call back if that is the case.

## 2024-09-09 ENCOUNTER — APPOINTMENT (OUTPATIENT)
Dept: DERMATOLOGY | Facility: CLINIC | Age: 54
End: 2024-09-09
Payer: COMMERCIAL

## 2024-09-17 ENCOUNTER — TELEPHONE (OUTPATIENT)
Dept: PRIMARY CARE | Facility: CLINIC | Age: 54
End: 2024-09-17
Payer: COMMERCIAL

## 2024-09-17 DIAGNOSIS — R79.9 ABNORMAL BLOOD CHEMISTRY: Primary | ICD-10-CM

## 2024-09-17 DIAGNOSIS — D72.829 LEUKOCYTOSIS, UNSPECIFIED TYPE: ICD-10-CM

## 2024-09-17 NOTE — TELEPHONE ENCOUNTER
Pt wants her lab work orders put in the system please. She would like to get them done this morning.

## 2024-09-20 ENCOUNTER — APPOINTMENT (OUTPATIENT)
Dept: ORTHOPEDIC SURGERY | Facility: CLINIC | Age: 54
End: 2024-09-20
Payer: COMMERCIAL

## 2024-09-20 ENCOUNTER — LAB (OUTPATIENT)
Dept: LAB | Facility: LAB | Age: 54
End: 2024-09-20
Payer: COMMERCIAL

## 2024-09-20 DIAGNOSIS — R79.9 ABNORMAL BLOOD CHEMISTRY: ICD-10-CM

## 2024-09-20 DIAGNOSIS — D72.829 LEUKOCYTOSIS, UNSPECIFIED TYPE: ICD-10-CM

## 2024-09-20 LAB
ALBUMIN SERPL BCP-MCNC: 4.3 G/DL (ref 3.4–5)
ALP SERPL-CCNC: 76 U/L (ref 33–110)
ALT SERPL W P-5'-P-CCNC: 25 U/L (ref 7–45)
ANION GAP SERPL CALC-SCNC: 13 MMOL/L (ref 10–20)
AST SERPL W P-5'-P-CCNC: 16 U/L (ref 9–39)
BASOPHILS # BLD AUTO: 0.07 X10*3/UL (ref 0–0.1)
BASOPHILS NFR BLD AUTO: 0.8 %
BILIRUB SERPL-MCNC: 0.4 MG/DL (ref 0–1.2)
BUN SERPL-MCNC: 15 MG/DL (ref 6–23)
CALCIUM SERPL-MCNC: 9.2 MG/DL (ref 8.6–10.6)
CHLORIDE SERPL-SCNC: 108 MMOL/L (ref 98–107)
CO2 SERPL-SCNC: 23 MMOL/L (ref 21–32)
CREAT SERPL-MCNC: 0.57 MG/DL (ref 0.5–1.05)
EGFRCR SERPLBLD CKD-EPI 2021: >90 ML/MIN/1.73M*2
EOSINOPHIL # BLD AUTO: 0.29 X10*3/UL (ref 0–0.7)
EOSINOPHIL NFR BLD AUTO: 3.3 %
ERYTHROCYTE [DISTWIDTH] IN BLOOD BY AUTOMATED COUNT: 13.2 % (ref 11.5–14.5)
EST. AVERAGE GLUCOSE BLD GHB EST-MCNC: 117 MG/DL
GLUCOSE SERPL-MCNC: 100 MG/DL (ref 74–99)
HBA1C MFR BLD: 5.7 %
HCT VFR BLD AUTO: 39.8 % (ref 36–46)
HGB BLD-MCNC: 12.9 G/DL (ref 12–16)
IMM GRANULOCYTES # BLD AUTO: 0.04 X10*3/UL (ref 0–0.7)
IMM GRANULOCYTES NFR BLD AUTO: 0.5 % (ref 0–0.9)
LYMPHOCYTES # BLD AUTO: 2.98 X10*3/UL (ref 1.2–4.8)
LYMPHOCYTES NFR BLD AUTO: 33.8 %
MCH RBC QN AUTO: 29.7 PG (ref 26–34)
MCHC RBC AUTO-ENTMCNC: 32.4 G/DL (ref 32–36)
MCV RBC AUTO: 92 FL (ref 80–100)
MONOCYTES # BLD AUTO: 0.58 X10*3/UL (ref 0.1–1)
MONOCYTES NFR BLD AUTO: 6.6 %
NEUTROPHILS # BLD AUTO: 4.85 X10*3/UL (ref 1.2–7.7)
NEUTROPHILS NFR BLD AUTO: 55 %
NRBC BLD-RTO: 0 /100 WBCS (ref 0–0)
PLATELET # BLD AUTO: 379 X10*3/UL (ref 150–450)
POTASSIUM SERPL-SCNC: 4.4 MMOL/L (ref 3.5–5.3)
PROT SERPL-MCNC: 6.8 G/DL (ref 6.4–8.2)
RBC # BLD AUTO: 4.35 X10*6/UL (ref 4–5.2)
SODIUM SERPL-SCNC: 140 MMOL/L (ref 136–145)
WBC # BLD AUTO: 8.8 X10*3/UL (ref 4.4–11.3)

## 2024-09-20 PROCEDURE — 83036 HEMOGLOBIN GLYCOSYLATED A1C: CPT

## 2024-09-20 PROCEDURE — 80053 COMPREHEN METABOLIC PANEL: CPT

## 2024-09-20 PROCEDURE — 85025 COMPLETE CBC W/AUTO DIFF WBC: CPT

## 2024-09-20 PROCEDURE — 36415 COLL VENOUS BLD VENIPUNCTURE: CPT

## 2024-09-24 ENCOUNTER — PHARMACY VISIT (OUTPATIENT)
Dept: PHARMACY | Facility: CLINIC | Age: 54
End: 2024-09-24
Payer: COMMERCIAL

## 2024-09-24 ENCOUNTER — SPECIALTY PHARMACY (OUTPATIENT)
Dept: PHARMACY | Facility: CLINIC | Age: 54
End: 2024-09-24

## 2024-09-26 ENCOUNTER — SPECIALTY PHARMACY (OUTPATIENT)
Dept: PHARMACY | Facility: CLINIC | Age: 54
End: 2024-09-26

## 2024-10-03 ENCOUNTER — APPOINTMENT (OUTPATIENT)
Dept: RADIOLOGY | Facility: HOSPITAL | Age: 54
End: 2024-10-03
Payer: COMMERCIAL

## 2024-10-03 ENCOUNTER — HOSPITAL ENCOUNTER (EMERGENCY)
Facility: HOSPITAL | Age: 54
Discharge: HOME | End: 2024-10-03
Attending: EMERGENCY MEDICINE
Payer: COMMERCIAL

## 2024-10-03 VITALS
HEART RATE: 60 BPM | HEIGHT: 60 IN | TEMPERATURE: 97 F | DIASTOLIC BLOOD PRESSURE: 76 MMHG | RESPIRATION RATE: 16 BRPM | SYSTOLIC BLOOD PRESSURE: 139 MMHG | WEIGHT: 200 LBS | BODY MASS INDEX: 39.27 KG/M2 | OXYGEN SATURATION: 100 %

## 2024-10-03 DIAGNOSIS — M25.562 CHRONIC PAIN OF BOTH KNEES: Primary | ICD-10-CM

## 2024-10-03 DIAGNOSIS — M25.561 CHRONIC PAIN OF BOTH KNEES: Primary | ICD-10-CM

## 2024-10-03 DIAGNOSIS — G89.29 CHRONIC PAIN OF BOTH KNEES: Primary | ICD-10-CM

## 2024-10-03 PROCEDURE — 2500000001 HC RX 250 WO HCPCS SELF ADMINISTERED DRUGS (ALT 637 FOR MEDICARE OP)

## 2024-10-03 PROCEDURE — 73562 X-RAY EXAM OF KNEE 3: CPT | Mod: BILATERAL PROCEDURE | Performed by: RADIOLOGY

## 2024-10-03 PROCEDURE — 93970 EXTREMITY STUDY: CPT

## 2024-10-03 PROCEDURE — 76882 US LMTD JT/FCL EVL NVASC XTR: CPT | Mod: FOREIGN READ | Performed by: RADIOLOGY

## 2024-10-03 PROCEDURE — 73562 X-RAY EXAM OF KNEE 3: CPT | Mod: 50

## 2024-10-03 PROCEDURE — 99284 EMERGENCY DEPT VISIT MOD MDM: CPT | Mod: 25

## 2024-10-03 RX ORDER — CYCLOBENZAPRINE HCL 5 MG
5 TABLET ORAL 3 TIMES DAILY
Qty: 30 TABLET | Refills: 0 | Status: SHIPPED | OUTPATIENT
Start: 2024-10-03 | End: 2024-10-13

## 2024-10-03 RX ORDER — ACETAMINOPHEN 325 MG/1
650 TABLET ORAL EVERY 6 HOURS PRN
Qty: 60 TABLET | Refills: 0 | Status: SHIPPED | OUTPATIENT
Start: 2024-10-03 | End: 2024-10-17

## 2024-10-03 RX ORDER — ACETAMINOPHEN 325 MG/1
975 TABLET ORAL ONCE
Status: COMPLETED | OUTPATIENT
Start: 2024-10-03 | End: 2024-10-03

## 2024-10-03 RX ORDER — CYCLOBENZAPRINE HCL 5 MG
5 TABLET ORAL ONCE
Status: COMPLETED | OUTPATIENT
Start: 2024-10-03 | End: 2024-10-03

## 2024-10-03 RX ADMIN — CYCLOBENZAPRINE HYDROCHLORIDE 5 MG: 5 TABLET, FILM COATED ORAL at 11:09

## 2024-10-03 RX ADMIN — ACETAMINOPHEN 325MG 975 MG: 325 TABLET ORAL at 11:09

## 2024-10-03 ASSESSMENT — COLUMBIA-SUICIDE SEVERITY RATING SCALE - C-SSRS
6. HAVE YOU EVER DONE ANYTHING, STARTED TO DO ANYTHING, OR PREPARED TO DO ANYTHING TO END YOUR LIFE?: NO
1. IN THE PAST MONTH, HAVE YOU WISHED YOU WERE DEAD OR WISHED YOU COULD GO TO SLEEP AND NOT WAKE UP?: NO
2. HAVE YOU ACTUALLY HAD ANY THOUGHTS OF KILLING YOURSELF?: NO

## 2024-10-03 ASSESSMENT — PAIN DESCRIPTION - ORIENTATION: ORIENTATION: RIGHT

## 2024-10-03 ASSESSMENT — PAIN SCALES - GENERAL
PAINLEVEL_OUTOF10: 10 - WORST POSSIBLE PAIN
PAINLEVEL_OUTOF10: 8

## 2024-10-03 ASSESSMENT — PAIN DESCRIPTION - PAIN TYPE: TYPE: ACUTE PAIN

## 2024-10-03 ASSESSMENT — PAIN - FUNCTIONAL ASSESSMENT
PAIN_FUNCTIONAL_ASSESSMENT: 0-10

## 2024-10-03 ASSESSMENT — PAIN DESCRIPTION - LOCATION
LOCATION: KNEE
LOCATION: KNEE

## 2024-10-03 NOTE — ED TRIAGE NOTES
PT is A/ox4 coming in for right knee pain. PT has a history of arthritis and gets injections. Yesterday the pain increased and is shooting up to her back.

## 2024-10-03 NOTE — ED PROVIDER NOTES
CC: Knee Pain     History provided by: Patient  Limitations to History: None    HPI:  Patient is a 54-year-old female with history of bilateral knee arthritis, obesity, insomnia who presents with bilateral knee pain.  Patient states she has had chronic knee pain and sees orthopedic surgery and her last gel injection was August 15.  She states yesterday it became acutely worse and she was in severe pain.  She states she took arthritis medication last night but did not take anything this morning.  She states she is having pain when walking but is able to walk.  She denies any trauma, falls, numbness, tingling.  She states her pain is shooting up and down her legs and is present in both legs.  She denies any history of blood clots.  She denies any back pain, chest pain, difficulty breathing, blood thinner use.  She states she called her orthopedic surgeon Dr. Nguyen who advised her to go to ED and evaluated for DVT.    I reviewed progress note from orthopedic surgery from August 15, 2024 when patient had bilateral sodium hyaluronate injections.      External Records Reviewed:  I reviewed prior ED visits, Care Everywhere, discharge summaries and outpatient records as appropriate.   ???????????????????????????????????????????????????????????????  Triage Vitals:  T 36.1 °C (97 °F)  HR 72  /77  RR 16  O2 99 % None (Room air)    Physical Exam  Vitals and nursing note reviewed.   Constitutional:       General: She is not in acute distress.     Appearance: Normal appearance.   HENT:      Head: Normocephalic and atraumatic.   Eyes:      Conjunctiva/sclera: Conjunctivae normal.   Cardiovascular:      Rate and Rhythm: Normal rate and regular rhythm.   Pulmonary:      Effort: Pulmonary effort is normal. No respiratory distress.   Abdominal:      General: Abdomen is flat.      Palpations: Abdomen is soft.   Musculoskeletal:         General: Normal range of motion.      Cervical back: Normal range of motion and neck  supple.      Comments: Ambulatory with steady gait, full range of motion and strength, sensation intact in bilateral lower extremities, no appreciable joint effusions, no calf tenderness bilaterally, no popliteal step-offs, no tibial plateau tenderness bilaterally   Skin:     General: Skin is warm and dry.   Neurological:      General: No focal deficit present.      Mental Status: She is alert and oriented to person, place, and time. Mental status is at baseline.   Psychiatric:         Mood and Affect: Mood normal.         Behavior: Behavior normal.        ???????????????????????????????????????????????????????????????  ED Course/Treatment/Medical Decision Making  MDM:  Patient is a 54 old female who presents with bilateral knee pain. Vital signs are stable.  There is no signs of trauma, deformity, effusion on examination.  I blood concern for septic arthritis, acute fracture.  Did obtain bilateral knee films, I suspect likely known degenerative disease.  I have low suspicion for acute DVT in the absence of swelling, erythema, calf tenderness however will obtain bilateral DVT ultrasound as well.  Multimodal pain regimen in ED given.  I am limited in my analgesic options as patient has multiple allergies and history of gastric ulcer prohibiting NSAID use.      ED Course:  ED Course as of 10/03/24 1250   Thu Oct 03, 2024   1220 DVT US reviewed:  IMPRESSION:  No evidence for DVT within the lower extremities bilaterally.    [SA]   1221 Bilateral knee XR with no acute abnormality, discussed findings with patient, I did consider course of outpatient steroids for radiculopathy however deferred at this time and advised close follow up with orthopedic surgeon as patient is agreeable [SA]      ED Course User Index  [SA] Brianna Eid,          Diagnoses as of 10/03/24 1250   Chronic pain of both knees         EKG Interpretation:  See ED Course/Below:    Independent Interpretation of Studies:  I independently interpreted  labs/imaging as stated in ED Course or below.    Differential diagnoses considered include but are not limited to: See MDM/Below:    Social Determinants Limiting Care:  None identified The following actions were taken to address these social determinants:      Discussion of Management with Other Providers: See MDM/Below:    Disposition:  Discussed differential and workup results including pertinent labs/imaging and any incidental findings if applicable. Patient will follow-up with the primary physician in the next 2-3 days. Return if worse. They understand return precautions and discharge instructions. They were given the opportunity to ask any questions. All of their questions were answered. Patient and family/friend/caregiver are in agreement with this plan.       KYRA Juarez, PGY-3    I reviewed the case with the attending ED physician. The attending ED physician agrees with the plan. Patient and/or patient´s representative was counseled regarding labs, imaging, likely diagnosis, and plan. All questions were answered.    Disclaimer: This note was dictated by speech recognition.  Attempt at proofreading was made to minimize errors.  Errors in transcription may be present.  Please call if questions.    Procedures ? SmartLinks last updated 10/3/2024 12:50 PM          Brianna Eid DO  Resident  10/03/24 [id number]

## 2024-10-03 NOTE — DISCHARGE INSTRUCTIONS
You did not have a blood clot on ultrasound today, your x-rays were negative for any fractures.  Please follow-up with your primary care physician and orthopedic surgeon within the next 3 to 5 days.  I did send prescriptions for Tylenol and Flexeril to your pharmacy.

## 2024-10-04 ENCOUNTER — TELEPHONE (OUTPATIENT)
Dept: ORTHOPEDIC SURGERY | Facility: CLINIC | Age: 54
End: 2024-10-04
Payer: COMMERCIAL

## 2024-10-05 DIAGNOSIS — R05.9 COUGH, UNSPECIFIED TYPE: Primary | ICD-10-CM

## 2024-10-05 RX ORDER — HYDROCODONE BITARTRATE AND HOMATROPINE METHYLBROMIDE ORAL SOLUTION 5; 1.5 MG/5ML; MG/5ML
5 LIQUID ORAL NIGHTLY PRN
Qty: 70 ML | Refills: 0 | Status: SHIPPED | OUTPATIENT
Start: 2024-10-05 | End: 2024-10-12

## 2024-10-07 RX ORDER — DUPILUMAB 300 MG/2ML
INJECTION, SOLUTION SUBCUTANEOUS
Qty: 4 ML | Refills: 9 | Status: CANCELLED | OUTPATIENT
Start: 2024-10-01 | End: 2025-10-01

## 2024-10-10 ENCOUNTER — TELEMEDICINE (OUTPATIENT)
Dept: PRIMARY CARE | Facility: CLINIC | Age: 54
End: 2024-10-10
Payer: COMMERCIAL

## 2024-10-10 DIAGNOSIS — R05.9 COUGH, UNSPECIFIED TYPE: Primary | ICD-10-CM

## 2024-10-10 PROCEDURE — 99213 OFFICE O/P EST LOW 20 MIN: CPT | Performed by: INTERNAL MEDICINE

## 2024-10-10 RX ORDER — AZITHROMYCIN 250 MG/1
TABLET, FILM COATED ORAL
Qty: 6 TABLET | Refills: 0 | Status: SHIPPED | OUTPATIENT
Start: 2024-10-10 | End: 2024-10-15

## 2024-10-10 NOTE — PROGRESS NOTES
Subjective   Patient ID: Kandi Bliss is a 54 y.o. female who presents for No chief complaint on file..    HPI    I performed this visit using real-time telehealth tools, including an audio/video connection between Kandi Bliss and myself Dr Givens in office [facility], [place], [place]  Patient on with me on a virtual visit  Coughing since last week, taking the cough syrup   Stopped smoking while sick   Review of Systems  General: see hpi  Ears, Nose, Throat : see hpi  Dermatologic: Negative for new skin conditions, rash  Respiratory: see hpi  Cardiovascular: Negative for chest pain, palpitations, or leg swelling  Gastrointestinal: Negative for nausea/vomiting, abdominal pain, changes in bowel habits  Neurological: Negative for dizziness see hpi headaches    Previous history  Past Medical History:   Diagnosis Date    Abdominal distension (gaseous) 02/04/2021    Bloating    Abnormal findings on diagnostic imaging of other specified body structures 12/23/2021    Abnormal ultrasound of pelvis    Acute candidiasis of vulva and vagina 10/13/2016    Yeast infection of the vagina    Acute gastric ulcer without hemorrhage or perforation 05/13/2016    Acute gastric ulcer    Acute stress reaction 12/20/2018    Acute reaction to stress    Acute upper respiratory infection, unspecified 09/26/2017    Acute upper respiratory infection    Chronic rhinitis 10/08/2018    Rhinitis    Dermatitis, unspecified 10/29/2016    Eczema    Diarrhea, unspecified 11/16/2020    Diarrhea    Dietary counseling and surveillance     Pre-bariatric surgery nutrition evaluation    Disorder of white blood cells, unspecified 09/22/2022    Abnormal white blood cell    Dorsalgia, unspecified 12/10/2021    Back pain    Dyspnea, unspecified 03/13/2020    Dyspnea    Encounter for other screening for malignant neoplasm of breast 12/13/2021    Breast screening    Encounter for screening for malignant neoplasm of colon 06/05/2021     Colon cancer screening    Epigastric pain 05/23/2016    Abdominal pain, epigastric    Frequency of micturition 01/10/2019    Urinary frequency    Headache, unspecified 11/16/2020    Headache    Hyperuricemia without signs of inflammatory arthritis and tophaceous disease 09/22/2022    Hyperuricemia    Influenza due to unidentified influenza virus with other respiratory manifestations 03/10/2020    Influenza    Other conditions influencing health status 11/16/2020    Sweating    Other disturbances of skin sensation 09/06/2018    Skin pain    Other injury of unspecified body region, initial encounter 10/19/2022    Muscle strain    Other malaise 03/24/2022    Malaise and fatigue    Other specified symptoms and signs involving the circulatory and respiratory systems 10/02/2020    Runny nose    Pain in right hand 11/03/2016    Right hand pain    Pain in right shoulder 12/16/2020    Bilateral shoulder pain    Pain in right shoulder 06/13/2018    Right shoulder pain    Pain in unspecified joint 03/18/2020    Joint pain    Pain in unspecified knee 06/02/2015    Acute knee pain    Palpitations 03/13/2020    Palpitations    Paresthesia of skin 05/30/2019    Paresthesia    Periapical abscess without sinus 04/27/2022    Tooth infection    Personal history of other diseases of the female genital tract 01/22/2019    History of ovarian cyst    Personal history of other diseases of the nervous system and sense organs 01/03/2020    History of acute conjunctivitis    Personal history of other diseases of the nervous system and sense organs 12/27/2018    History of acute otitis media    Personal history of other diseases of the respiratory system 01/24/2017    History of acute bronchitis    Personal history of other diseases of the respiratory system 10/21/2015    History of acute bronchitis    Personal history of other infectious and parasitic diseases 10/13/2016    History of trichomonal vaginitis    Personal history of peptic  ulcer disease 04/26/2021    History of gastric ulcer    Rash and other nonspecific skin eruption 09/26/2016    Rash    Rash and other nonspecific skin eruption 08/05/2015    Rash    Sleep disorder, unspecified 11/16/2020    Sleep disturbance    Unspecified abdominal pain 06/05/2021    Abdominal pain    Unspecified acute and subacute iridocyclitis 11/20/2017    Acute iritis    Unspecified disturbances of skin sensation 09/07/2016    Paresthesias/numbness    Unspecified iridocyclitis 01/19/2018    Traumatic iritis    Unspecified otitis externa, unspecified ear 01/26/2016    Otitis externa    Unspecified visual field defects 01/19/2018    Unspecified visual field defects    Unspecified visual field defects 01/19/2018    Unspecified visual field defects     Past Surgical History:   Procedure Laterality Date    HYSTERECTOMY  05/26/2015    Hysterectomy    MR HEAD ANGIO WO IV CONTRAST  11/27/2018    MR HEAD ANGIO WO IV CONTRAST 11/27/2018 [facility]    MR NECK ANGIO WO IV CONTRAST  11/27/2018    MR NECK ANGIO WO IV CONTRAST 11/27/2018 [facility]    OTHER SURGICAL HISTORY  07/26/2019    Laparoscopic salpingo-oophorectomy    OTHER SURGICAL HISTORY  03/22/2019    Laparoscopy    TUBAL LIGATION  05/26/2015    Tubal Ligation     Social History     Tobacco Use    Smoking status: Every Day     Current packs/day: 0.50     Types: Cigarettes    Smokeless tobacco: Never   Vaping Use    Vaping status: Never Used   Substance Use Topics    Alcohol use: Yes     Alcohol/week: 6.0 standard drinks of alcohol     Types: 6 Cans of beer per week    Drug use: Never     Family History   Problem Relation Name Age of Onset    Breast cancer Mother       Allergies   Allergen Reactions    Avelox [Moxifloxacin] Diarrhea    Benzonatate Unknown     PT'S DOESN'T REMEMBER    Oxycodone Itching    Penicillins Hives    Bacitracin Rash    Latex Itching and Rash     Current Outpatient Medications   Medication Instructions    acetaminophen  (TYLENOL) 650 mg, oral, Every 6 hours PRN    ALPRAZolam (XANAX) 0.5 mg, oral, 2 times daily, Prn anxiety    atorvastatin (LIPITOR) 40 mg, oral, Daily    azelastine (Astelin) 137 mcg (0.1 %) nasal spray 1 spray, Each Nostril, 2 times daily, Use in each nostril as directed    busPIRone (BUSPAR) 5 mg, oral, 2 times daily    cyclobenzaprine (FLEXERIL) 5 mg, oral, 3 times daily    ergocalciferol (VITAMIN D-2) 1,250 mcg, oral, Once Weekly    ergocalciferol (VITAMIN D-2) 1.25 mg, oral, Once Weekly    famotidine (PEPCID) 40 mg, oral, 2 times daily    fluticasone (Flonase) 50 mcg/actuation nasal spray 2 sprays, Each Nostril, Daily, Shake gently. Before first use, prime pump. After use, clean tip and replace cap.    hydrocodone-homatropine (Hycodan) 5-1.5 mg/5 mL syrup 5 mL, oral, Nightly PRN    semaglutide (RYBELSUS) 3 mg, oral, Daily    tirzepatide (weight loss) (ZEPBOUND) 2.5 mg, subcutaneous, Every 7 days    traZODone (DESYREL) 50 mg, oral, Nightly PRN    zolpidem (AMBIEN) 10 mg, oral, Nightly PRN       Objective       Physical Exam  via [facility] [facility]  General: Well groomed, well nourished , speaks full sentences  Alert Cooperative , no apparent distress   Skin: Good color does not appear dehydrated   Eyes: Extra ocular muscle movements intact, anicteric sclerae  Neck: Supple, with good range of motion looking behind her and moving head sideways to cough   Neurological: Alert, oriented        Assessment/Plan   Kandi Bliss is a 54 y.o. female who presents for the concerns below:    Problem List Items Addressed This Visit    None  COUGH PLAN Rest, sleep is important.   Hydration important at least 6-8 glasses of water  Take analgesics Tylenol   Frequent hand washing  Sanitize surroundings  Guaifenesin if tolerated  Change toothbrush once recovered.   Antimicrobial therapy as appropriate - Amoxicillin if not allergic, Azithromycin continue over the counter guaifenesin DM . Call if shortness of breath, blood and  sputum, change in character of cough, development of fever or chills.   If with inability to maintain nutrition and hydration seek emergent care.  Avoid exposure to tobacco smoke and fumes. cautioned that  antibiotic may cause c diff colitis  ,    Time Spent  with patient:  7  minutes of which greater than 50 percent was spent counselling and coordinating care.         Discussed with:   Return in :    Portions of this note were generated using digital voice recognition software, and may contain grammatical errors       Kalbe Branett MD  10/10/24  3:40 PM

## 2024-10-10 NOTE — PATIENT INSTRUCTIONS
New internal medicine doctor Dr Chris Laurent for her son  [facility]   [id number]  [place] [id number] [phone]

## 2024-10-14 ENCOUNTER — TELEPHONE (OUTPATIENT)
Dept: PRIMARY CARE | Facility: CLINIC | Age: 54
End: 2024-10-14
Payer: COMMERCIAL

## 2024-10-14 DIAGNOSIS — R05.9 COUGH, UNSPECIFIED TYPE: ICD-10-CM

## 2024-10-14 RX ORDER — HYDROCODONE BITARTRATE AND HOMATROPINE METHYLBROMIDE ORAL SOLUTION 5; 1.5 MG/5ML; MG/5ML
5 LIQUID ORAL NIGHTLY PRN
Qty: 70 ML | Refills: 0 | Status: SHIPPED | OUTPATIENT
Start: 2024-10-14 | End: 2024-10-21

## 2024-10-14 NOTE — TELEPHONE ENCOUNTER
Pt called requesting Rx cough syrup.  Pt still has cough.    Pt also requested the weight loss meds be deleted from her file.  PA not approved;  she will not pay out of pocket.  [facility] will not let [facility] remove meds.

## 2024-10-14 NOTE — TELEPHONE ENCOUNTER
Patient stated she needs a prescription for more cough syrup sent to [facility] on [address] .  She also wanted to let you know that the sinus medication is not working.

## 2024-10-15 NOTE — TELEPHONE ENCOUNTER
Called pt. Pt said pharmacy wouldn't fill because it is a controlled substance and 5mll is supposed to last 14 days and not 7. AQ gave permission to refill early so I called the pharmacy to let them know and then called the pt to inform her as well.

## 2024-10-16 ENCOUNTER — APPOINTMENT (OUTPATIENT)
Dept: ORTHOPEDIC SURGERY | Facility: CLINIC | Age: 54
End: 2024-10-16
Payer: COMMERCIAL

## 2024-10-16 ENCOUNTER — HOSPITAL ENCOUNTER (OUTPATIENT)
Facility: HOSPITAL | Age: 54
Setting detail: OUTPATIENT SURGERY
End: 2024-10-16
Attending: ORTHOPAEDIC SURGERY | Admitting: ORTHOPAEDIC SURGERY
Payer: COMMERCIAL

## 2024-10-16 DIAGNOSIS — M23.90 INTERNAL DERANGEMENT OF KNEE, UNSPECIFIED LATERALITY: Primary | ICD-10-CM

## 2024-10-16 DIAGNOSIS — M23.41 LOOSE BODY IN KNEE, RIGHT KNEE: ICD-10-CM

## 2024-10-16 PROCEDURE — 99214 OFFICE O/P EST MOD 30 MIN: CPT | Performed by: ORTHOPAEDIC SURGERY

## 2024-10-16 RX ORDER — CEFAZOLIN SODIUM 2 G/100ML
2 INJECTION, SOLUTION INTRAVENOUS ONCE
OUTPATIENT
Start: 2024-10-16 | End: 2024-10-16

## 2024-10-16 NOTE — PROGRESS NOTES
Returns for right knee.  Pain is affecting her daily activity.  No relief with from the Durolane.  We discussed possible surgery.    Exam: Unchanged.    Assessment: Right knee internal derangement.  Early arthritis.    Plan: Discussed nonoperative and operative options in detail.   Risk and benefits discussed in detail. All questions answered today.  Recovery timeline and expectations discussed in detail.  Discussed knee arthroscopy.  Do not feel her knee requires knee replacement this time.  Tried cortisone and gel injections with persistent discussed recovery from knee arthroscopy.  Discussed return to work.  Right knee arthroscopy 85180. Crutches. Outpatient. ANesthesia: General and local.  Crutches or Walker

## 2024-10-17 ENCOUNTER — APPOINTMENT (OUTPATIENT)
Dept: PHYSICAL THERAPY | Facility: CLINIC | Age: 54
End: 2024-10-17
Payer: COMMERCIAL

## 2024-10-22 ENCOUNTER — OFFICE VISIT (OUTPATIENT)
Dept: PODIATRY | Facility: CLINIC | Age: 54
End: 2024-10-22
Payer: COMMERCIAL

## 2024-10-22 DIAGNOSIS — L85.1 ACQUIRED KERATODERMA: ICD-10-CM

## 2024-10-22 DIAGNOSIS — M79.672 PAIN IN BOTH FEET: ICD-10-CM

## 2024-10-22 DIAGNOSIS — M79.671 PAIN IN BOTH FEET: ICD-10-CM

## 2024-10-22 DIAGNOSIS — B35.1 ONYCHOMYCOSIS: Primary | ICD-10-CM

## 2024-10-22 PROCEDURE — 99213 OFFICE O/P EST LOW 20 MIN: CPT | Performed by: PODIATRIST

## 2024-10-22 PROCEDURE — 99203 OFFICE O/P NEW LOW 30 MIN: CPT | Performed by: PODIATRIST

## 2024-10-22 PROCEDURE — 87102 FUNGUS ISOLATION CULTURE: CPT | Mod: OUT | Performed by: PODIATRIST

## 2024-10-22 NOTE — PROGRESS NOTES
"Chief Complaint   Patient presents with    Foot Problem     New PT here today for PARMJIT nail and toe concerns. Was seen 3 yrs ago by Michele [facility] podiatry.     HPI: C/O toenail changes, thinks she also has a splinter in her R heel.  H/O severe eczema, \"its affecting the toes\".  Was seen another DPM years ago, had a nail biopsy, never received the results.  Takes dupixen every 2 weeks.  Knee scope scheduled for Nov 14th.  Patient also concerned about a bump on the medial left foot, no pain in this area  PMH, PSx, Medications and allergies reviewed.  ROS negative except for what is stated in HPI.    Physical Exam  Patient alert, oriented, no acute distress  Respiratory: non labored breathing  Psychiatric: Mood and affect normal/baseline  HEENT: sclera clear    VASC: +2/4 pedal pulses B/L.  CFT brisk all digits.  Skin temperature is warm to warm proximal distal B/L.  (+)hair growth B/L.   Mild LE edema B/L and multiple varicosities.     NEURO: Vibratory intact B/L.  Light touch intact B/L.   5.07 Cubero-Michelle monofilament intact B/L.    DERM:Nails are thickened, discolored, crumbly, painful and elongated with subungual debris L>R.  Previously well healed L hallux nail avulsion.  Pain on palpation to the nails. No cellulitis noted.  Preulcerative hyperkeratotic painful lesion on the plantar right heel with a deep core consistent with a plantar keratoma.  No foreign body visualized.  No verruca noted.  No cellulitis noted.     MUSCULOSKEL: +5/5 muscle strength B/L.    Pes planus noted B/L  Palpable herniated adipose tissue noted on the medial left calcaneus, nondiscrete no defined borders noted    Assessment and Plan  #1  Onychomycosis  Discussed pathology and treatment options  Nail biopsy taken  Possible p.o. Lamisil pending results  Will discuss results with with patient once received  Nails were debrided  Patient can follow as needed    #2  Plantar keratoma right heel  Paring of all hyperkeratotic tissue " with an 86 blade without complications  Cryotreatment x 3 applied to the skin lesion  No local wound care needed  Follow-up as needed to discuss recurrence rate with patient    #3  Herniated fat medial left heel  No pain  No treatment needed

## 2024-10-24 ENCOUNTER — TELEPHONE (OUTPATIENT)
Dept: PREADMISSION TESTING | Facility: HOSPITAL | Age: 54
End: 2024-10-24

## 2024-10-24 ENCOUNTER — TELEPHONE (OUTPATIENT)
Dept: PREADMISSION TESTING | Facility: HOSPITAL | Age: 54
End: 2024-10-24
Payer: COMMERCIAL

## 2024-10-24 ENCOUNTER — CLINICAL SUPPORT (OUTPATIENT)
Dept: PREADMISSION TESTING | Facility: HOSPITAL | Age: 54
End: 2024-10-24
Payer: COMMERCIAL

## 2024-10-24 ENCOUNTER — TELEPHONE (OUTPATIENT)
Dept: PRIMARY CARE | Facility: CLINIC | Age: 54
End: 2024-10-24
Payer: COMMERCIAL

## 2024-10-24 DIAGNOSIS — E66.812 CLASS 2 SEVERE OBESITY WITH SERIOUS COMORBIDITY AND BODY MASS INDEX (BMI) OF 39.0 TO 39.9 IN ADULT, UNSPECIFIED OBESITY TYPE: Primary | ICD-10-CM

## 2024-10-24 DIAGNOSIS — M17.12 PRIMARY OSTEOARTHRITIS OF LEFT KNEE: Primary | ICD-10-CM

## 2024-10-24 DIAGNOSIS — M23.41 LOOSE BODY IN KNEE, RIGHT KNEE: ICD-10-CM

## 2024-10-24 DIAGNOSIS — R06.09 DOE (DYSPNEA ON EXERTION): ICD-10-CM

## 2024-10-24 DIAGNOSIS — M23.90 INTERNAL DERANGEMENT OF KNEE, UNSPECIFIED LATERALITY: ICD-10-CM

## 2024-10-24 DIAGNOSIS — E66.01 CLASS 2 SEVERE OBESITY WITH SERIOUS COMORBIDITY AND BODY MASS INDEX (BMI) OF 39.0 TO 39.9 IN ADULT, UNSPECIFIED OBESITY TYPE: Primary | ICD-10-CM

## 2024-10-24 DIAGNOSIS — Z01.810 PREOP CARDIOVASCULAR EXAM: ICD-10-CM

## 2024-10-24 NOTE — TELEPHONE ENCOUNTER
Pt wants referral to weigh loss management. The Zepbound was not approved and she would like to try something else. Please advise. At first when she called she wanted you to call her back but I can call her with whatever message and instructions.

## 2024-10-25 DIAGNOSIS — M23.90 INTERNAL DERANGEMENT OF KNEE, UNSPECIFIED LATERALITY: ICD-10-CM

## 2024-10-25 DIAGNOSIS — M23.41 LOOSE BODY IN KNEE, RIGHT KNEE: ICD-10-CM

## 2024-10-29 ENCOUNTER — APPOINTMENT (OUTPATIENT)
Dept: DERMATOLOGY | Facility: CLINIC | Age: 54
End: 2024-10-29
Payer: COMMERCIAL

## 2024-10-29 ENCOUNTER — LAB REQUISITION (OUTPATIENT)
Dept: LAB | Facility: HOSPITAL | Age: 54
End: 2024-10-29
Payer: COMMERCIAL

## 2024-10-29 ENCOUNTER — TELEPHONE (OUTPATIENT)
Dept: PRIMARY CARE | Facility: CLINIC | Age: 54
End: 2024-10-29

## 2024-10-29 DIAGNOSIS — G47.00 INSOMNIA, UNSPECIFIED: ICD-10-CM

## 2024-10-29 DIAGNOSIS — L30.1 ECZEMA, DYSHIDROTIC: Primary | ICD-10-CM

## 2024-10-29 DIAGNOSIS — B35.1 TINEA UNGUIUM: ICD-10-CM

## 2024-10-29 PROCEDURE — 99214 OFFICE O/P EST MOD 30 MIN: CPT | Performed by: DERMATOLOGY

## 2024-10-29 RX ORDER — CLOBETASOL PROPIONATE 0.5 MG/G
OINTMENT TOPICAL 2 TIMES DAILY PRN
Qty: 45 G | Refills: 3 | Status: SHIPPED | OUTPATIENT
Start: 2024-10-29

## 2024-10-29 RX ORDER — ZOLPIDEM TARTRATE 10 MG/1
10 TABLET ORAL NIGHTLY PRN
Qty: 30 TABLET | Refills: 1 | Status: SHIPPED | OUTPATIENT
Start: 2024-10-29

## 2024-10-29 RX ORDER — TACROLIMUS 1 MG/G
OINTMENT TOPICAL 2 TIMES DAILY PRN
Qty: 60 G | Refills: 3 | Status: SHIPPED | OUTPATIENT
Start: 2024-10-29 | End: 2025-10-29

## 2024-10-29 NOTE — TELEPHONE ENCOUNTER
Hello,    Patient called to let you know that her dermatologist wants her to see a rheumatologist for her knee.      Thanks

## 2024-11-01 NOTE — TELEPHONE ENCOUNTER
Pt said she is concerned because her sister has cancer now and her mother  of cancer. She would like some genetic testing to see if she could potentially get cancer. She also is asking for a weight loss medication. She said she is very stressed. Please advise on genetic testing and weight loss medication. Thank you.

## 2024-11-06 ENCOUNTER — SPECIALTY PHARMACY (OUTPATIENT)
Dept: PHARMACY | Facility: CLINIC | Age: 54
End: 2024-11-06

## 2024-11-06 ENCOUNTER — APPOINTMENT (OUTPATIENT)
Dept: PREADMISSION TESTING | Facility: HOSPITAL | Age: 54
End: 2024-11-06
Payer: COMMERCIAL

## 2024-11-07 DIAGNOSIS — M17.10 ARTHRITIS OF KNEE: ICD-10-CM

## 2024-11-07 RX ORDER — HYALURONATE SODIUM, STABILIZED 60 MG/3 ML
60 SYRINGE (ML) INTRAARTICULAR ONCE
Qty: 6 ML | Refills: 0 | OUTPATIENT
Start: 2024-11-07 | End: 2024-11-07

## 2024-11-11 ENCOUNTER — APPOINTMENT (OUTPATIENT)
Dept: PRIMARY CARE | Facility: CLINIC | Age: 54
End: 2024-11-11
Payer: COMMERCIAL

## 2024-11-11 ENCOUNTER — TELEPHONE (OUTPATIENT)
Dept: PRIMARY CARE | Facility: CLINIC | Age: 54
End: 2024-11-11

## 2024-11-11 ENCOUNTER — LAB (OUTPATIENT)
Dept: LAB | Facility: LAB | Age: 54
End: 2024-11-11
Payer: COMMERCIAL

## 2024-11-11 VITALS — SYSTOLIC BLOOD PRESSURE: 132 MMHG | BODY MASS INDEX: 43.75 KG/M2 | DIASTOLIC BLOOD PRESSURE: 78 MMHG | WEIGHT: 224 LBS

## 2024-11-11 DIAGNOSIS — G47.30 SLEEP APNEA, UNSPECIFIED TYPE: ICD-10-CM

## 2024-11-11 DIAGNOSIS — Z79.899 MEDICATION MANAGEMENT: ICD-10-CM

## 2024-11-11 DIAGNOSIS — R73.03 PREDIABETES: ICD-10-CM

## 2024-11-11 DIAGNOSIS — F17.200 TOBACCO DEPENDENCE: ICD-10-CM

## 2024-11-11 DIAGNOSIS — E66.2 CLASS 3 OBESITY WITH ALVEOLAR HYPOVENTILATION, SERIOUS COMORBIDITY, AND BODY MASS INDEX (BMI) OF 40.0 TO 44.9 IN ADULT: Primary | ICD-10-CM

## 2024-11-11 DIAGNOSIS — E55.9 VITAMIN D DEFICIENCY, UNSPECIFIED: ICD-10-CM

## 2024-11-11 DIAGNOSIS — E66.813 CLASS 3 OBESITY WITH ALVEOLAR HYPOVENTILATION, SERIOUS COMORBIDITY, AND BODY MASS INDEX (BMI) OF 40.0 TO 44.9 IN ADULT: Primary | ICD-10-CM

## 2024-11-11 DIAGNOSIS — E78.5 HYPERLIPIDEMIA, UNSPECIFIED HYPERLIPIDEMIA TYPE: ICD-10-CM

## 2024-11-11 LAB
AMPHETAMINES UR QL SCN: NORMAL
BARBITURATES UR QL SCN: NORMAL
BZE UR QL SCN: NORMAL
CANNABINOIDS UR QL SCN: NORMAL
CREAT UR-MCNC: 55.4 MG/DL (ref 20–320)
PCP UR QL SCN: NORMAL

## 2024-11-11 PROCEDURE — 80354 DRUG SCREENING FENTANYL: CPT

## 2024-11-11 PROCEDURE — 82570 ASSAY OF URINE CREATININE: CPT

## 2024-11-11 PROCEDURE — 99214 OFFICE O/P EST MOD 30 MIN: CPT | Performed by: INTERNAL MEDICINE

## 2024-11-11 PROCEDURE — 80346 BENZODIAZEPINES1-12: CPT

## 2024-11-11 PROCEDURE — 80361 OPIATES 1 OR MORE: CPT

## 2024-11-11 PROCEDURE — 80358 DRUG SCREENING METHADONE: CPT

## 2024-11-11 PROCEDURE — 80368 SEDATIVE HYPNOTICS: CPT

## 2024-11-11 PROCEDURE — 80373 DRUG SCREENING TRAMADOL: CPT

## 2024-11-11 PROCEDURE — 80307 DRUG TEST PRSMV CHEM ANLYZR: CPT

## 2024-11-11 PROCEDURE — 80365 DRUG SCREENING OXYCODONE: CPT

## 2024-11-11 RX ORDER — ERGOCALCIFEROL 1.25 MG/1
1 CAPSULE ORAL
Qty: 12 CAPSULE | Refills: 0 | Status: SHIPPED | OUTPATIENT
Start: 2024-11-11

## 2024-11-11 ASSESSMENT — PATIENT HEALTH QUESTIONNAIRE - PHQ9
1. LITTLE INTEREST OR PLEASURE IN DOING THINGS: NOT AT ALL
SUM OF ALL RESPONSES TO PHQ9 QUESTIONS 1 AND 2: 6
2. FEELING DOWN, DEPRESSED OR HOPELESS: NEARLY EVERY DAY
2. FEELING DOWN, DEPRESSED OR HOPELESS: NOT AT ALL
SUM OF ALL RESPONSES TO PHQ9 QUESTIONS 1 AND 2: 0
1. LITTLE INTEREST OR PLEASURE IN DOING THINGS: NEARLY EVERY DAY

## 2024-11-11 NOTE — TELEPHONE ENCOUNTER
Patient stated the prescription for zepbound was declined.  Would you pleas send a prescription for metformin to the pharmacy.

## 2024-11-11 NOTE — PATIENT INSTRUCTIONS
Check with your mother's oncologist   And your sister's oncologist to see if the type of cancer they have is that hereditary       If the injection tirzepatide is not covered we can try metformin for your prediabetes

## 2024-11-11 NOTE — PROGRESS NOTES
Subjective   Patient ID: Kandi Bliss is a 54 y.o. female who presents for weight loss medication (Wants genetic testing done and cyst on back measured. ).    HPI  Patient in for a visit  Now her sister Winston has tonsillar cancer , smoking hx   Her mother had breast cancer not sure if her oncologist tested or recommended , they had never said that   Dad [place]   Review of Systems  General: Denies fever, chills, night sweats,  Eyes: Negative for recent visual changes  Ears, Nose, Throat :  Negative for hearing changes, sinus discomfort  Dermatologic: Negative for new skin conditions, rash  Respiratory: Negative for wheezing, shortness of breath, cough  Cardiovascular: Negative for chest pain, palpitations, or leg swelling  Gastrointestinal: Negative for nausea/vomiting, abdominal pain, changes in bowel habits  Genitourinary Negative for Urinary Incontinence  urgency , frequency, discomfort   Musculoskeletal: see hpi  Neurological: Negative for headaches, dizziness    Previous history  Past Medical History:   Diagnosis Date   • Abnormal Heart Score CT 07/17/2023    Total  24.31   • Anxiety    • Cardiology follow-up encounter 04/21/2023    Quinn NEWELL MD   • GERD (gastroesophageal reflux disease)    • Glaucoma suspect of both eyes    • Hyperlipidemia    • Insomnia    • Internal derangement of knee, unspecified laterality    • Loose body in knee, right knee     Plan: Right Knee Arthroscopy; Meniscectomy 11/14/24   • Moderate alcohol dependence (Multi)    • Obesity    • Psoriatic arthritis (Multi)    • Sleep apnea    • Tobacco abuse    • Vertigo    • Vitamin D insufficiency      Past Surgical History:   Procedure Laterality Date   • COLONOSCOPY  06/04/2021   • HYSTERECTOMY     • LAPAROTOMY SALPINGO OOPHORECTOMY     • MR HEAD ANGIO WO IV CONTRAST  11/27/2018    MR HEAD ANGIO WO IV CONTRAST 11/27/2018 [facility]   • MR NECK ANGIO WO IV CONTRAST  11/27/2018    MR NECK ANGIO  WO IV CONTRAST 11/27/2018 [facility]   • TUBAL LIGATION       Social History     Tobacco Use   • Smoking status: Every Day     Current packs/day: 0.50     Types: Cigarettes   • Smokeless tobacco: Never   Vaping Use   • Vaping status: Never Used   Substance Use Topics   • Alcohol use: Yes     Alcohol/week: 6.0 standard drinks of alcohol     Types: 6 Cans of beer per week   • Drug use: Never     Family History   Problem Relation Name Age of Onset   • Breast cancer Mother     • Atrial fibrillation Mother     • Glaucoma Mother     • Diabetes Mother     • Stroke Father     • Parkinsonism Maternal Grandmother       Allergies   Allergen Reactions   • Avelox [Moxifloxacin] Diarrhea   • Bacitracin Rash   • Benzonatate Unknown     PT'S DOESN'T REMEMBER   • Latex Itching and Rash   • Oxycodone Itching   • Penicillins Hives     Current Outpatient Medications   Medication Instructions   • ALPRAZolam (XANAX) 0.5 mg, oral, 2 times daily, Prn anxiety   • atorvastatin (LIPITOR) 40 mg, oral, Daily   • azelastine (Astelin) 137 mcg (0.1 %) nasal spray 1 spray, Each Nostril, 2 times daily, Use in each nostril as directed   • busPIRone (BUSPAR) 5 mg, oral, 2 times daily   • clobetasol (Temovate) 0.05 % ointment Topical, 2 times daily PRN, Apply to affected areas on arms/feet as needed   • ergocalciferol (VITAMIN D-2) 1,250 mcg, oral, Once Weekly   • ergocalciferol (VITAMIN D-2) 1.25 mg, Once Weekly   • famotidine (PEPCID) 40 mg, oral, 2 times daily   • fluticasone (Flonase) 50 mcg/actuation nasal spray 2 sprays, Each Nostril, Daily, Shake gently. Before first use, prime pump. After use, clean tip and replace cap.   • semaglutide (RYBELSUS) 3 mg, oral, Daily   • tacrolimus (Protopic) 0.1 % ointment Topical, 2 times daily PRN, Apply to affected areas of armpits as needed   • tirzepatide (weight loss) (ZEPBOUND) 2.5 mg, subcutaneous, Every 7 days   • traZODone (DESYREL) 50 mg, oral, Nightly PRN   • zolpidem (AMBIEN) 10 mg, oral,  Nightly PRN       Objective       Physical Exam  Vital Signs: as recorded above  General: Well groomed, well nourished   Orientation:  Alert , oriented to time, place , and person   Mood and Affect:  Cooperative , no apparent distress normal affect  Skin: Good color, good turgor, soft nontender 2 cm vertical and 2.5 horizontal left paraspinal area  Eyes: Extra ocular muscle movements intact, anicteric sclerae  Neck: Supple, full range of movement  Chest: Normal breath sounds, normal chest wall exam, symmetric, good air entry, clear to auscultation  Heart: Regular rate and rhythm, without murmur, gallop, or rubs  BACK:  no CTLS spine tenderness, no flank tenderness  Extremities: full range of movement  bilateral UE and bilateral LE,  no lower extremity edema  Neurological: Alert, oriented, cranial nerves II-XII grossly intact except for visual acuity  Sensation:  Intact   Gait: normal steady    Assessment/Plan   Kandi Bliss is a 54 y.o. female who presents for the concerns below:    Problem List Items Addressed This Visit    None  OBESITY WITH UNTREATED SLEEP APNEA, HYPERCHOLESTEROLEMIA  will retry tirzepatide at  [facility]     TOBACCO USE DISORDER PLAN:  Counseled on strategies to help quit smoking.  Do not buy favorite cigarettes .   Explained risks of continuing.  Recommended medication and suggested over the counter patches. Gum, lozenges that may help patient quit.    Genetic testing , should ask the oncologists both of her sister and mother to see if she needs genetic testing     HYPERGLYCEMIA PLAN:Stable without  medications currently  , continue to follow Diabetic diet,  urine microalbumin utd , ophthalmology exam.  Need Podiatry checks periodically.               Discussed with:   Return in :    Portions of this note were generated using digital voice recognition software, and may contain grammatical errors       Kaleb Barnett MD  11/11/24  10:38 AM

## 2024-11-12 ENCOUNTER — SPECIALTY PHARMACY (OUTPATIENT)
Dept: PHARMACY | Facility: CLINIC | Age: 54
End: 2024-11-12

## 2024-11-12 DIAGNOSIS — E66.813 CLASS 3 SEVERE OBESITY WITH BODY MASS INDEX (BMI) OF 40.0 TO 44.9 IN ADULT, UNSPECIFIED OBESITY TYPE, UNSPECIFIED WHETHER SERIOUS COMORBIDITY PRESENT: Primary | ICD-10-CM

## 2024-11-12 DIAGNOSIS — E66.01 CLASS 3 SEVERE OBESITY WITH BODY MASS INDEX (BMI) OF 40.0 TO 44.9 IN ADULT, UNSPECIFIED OBESITY TYPE, UNSPECIFIED WHETHER SERIOUS COMORBIDITY PRESENT: Primary | ICD-10-CM

## 2024-11-12 LAB
CALCOFLUOR WHITE SPEC: ABNORMAL
FUNGUS SPEC CULT: ABNORMAL

## 2024-11-14 LAB
1OH-MIDAZOLAM UR CFM-MCNC: <25 NG/ML
6MAM UR CFM-MCNC: <25 NG/ML
7AMINOCLONAZEPAM UR CFM-MCNC: <25 NG/ML
A-OH ALPRAZ UR CFM-MCNC: <25 NG/ML
ALPRAZ UR CFM-MCNC: <25 NG/ML
CHLORDIAZEP UR CFM-MCNC: <25 NG/ML
CLONAZEPAM UR CFM-MCNC: <25 NG/ML
CODEINE UR CFM-MCNC: <50 NG/ML
DIAZEPAM UR CFM-MCNC: <25 NG/ML
EDDP UR CFM-MCNC: <25 NG/ML
FENTANYL UR CFM-MCNC: <2.5 NG/ML
HYDROCODONE CTO UR CFM-MCNC: <25 NG/ML
HYDROMORPHONE UR CFM-MCNC: <25 NG/ML
LORAZEPAM UR CFM-MCNC: <25 NG/ML
METHADONE UR CFM-MCNC: <25 NG/ML
MIDAZOLAM UR CFM-MCNC: <25 NG/ML
MORPHINE UR CFM-MCNC: <50 NG/ML
NORDIAZEPAM UR CFM-MCNC: <25 NG/ML
NORFENTANYL UR CFM-MCNC: <2.5 NG/ML
NORHYDROCODONE UR CFM-MCNC: <25 NG/ML
NOROXYCODONE UR CFM-MCNC: <25 NG/ML
NORTRAMADOL UR-MCNC: <50 NG/ML
OXAZEPAM UR CFM-MCNC: <25 NG/ML
OXYCODONE UR CFM-MCNC: <25 NG/ML
OXYMORPHONE UR CFM-MCNC: <25 NG/ML
TEMAZEPAM UR CFM-MCNC: <25 NG/ML
TRAMADOL UR CFM-MCNC: <50 NG/ML
ZOLPIDEM UR CFM-MCNC: 103 NG/ML
ZOLPIDEM UR-MCNC: <25 NG/ML

## 2024-11-21 ENCOUNTER — TELEMEDICINE (OUTPATIENT)
Dept: PHARMACY | Facility: HOSPITAL | Age: 54
End: 2024-11-21
Payer: COMMERCIAL

## 2024-11-21 DIAGNOSIS — E66.01 CLASS 3 SEVERE OBESITY WITH BODY MASS INDEX (BMI) OF 40.0 TO 44.9 IN ADULT, UNSPECIFIED OBESITY TYPE, UNSPECIFIED WHETHER SERIOUS COMORBIDITY PRESENT: ICD-10-CM

## 2024-11-21 DIAGNOSIS — E66.813 CLASS 3 SEVERE OBESITY WITH BODY MASS INDEX (BMI) OF 40.0 TO 44.9 IN ADULT, UNSPECIFIED OBESITY TYPE, UNSPECIFIED WHETHER SERIOUS COMORBIDITY PRESENT: ICD-10-CM

## 2024-11-21 PROBLEM — R73.03 PREDIABETES: Status: ACTIVE | Noted: 2024-11-21

## 2024-11-21 NOTE — PROGRESS NOTES
Pharmacist Clinic: Weight Management    Kandi Bliss was referred to the Clinical Pharmacy Team for weight management.     Referring Provider: Kaleb Barnett MD  Problem List Items Addressed This Visit    None  Visit Diagnoses       Class 3 severe obesity with body mass index (BMI) of 40.0 to 44.9 in adult, unspecified obesity type, unspecified whether serious comorbidity present              HISTORY OF PRESENT ILLNESS    Diet: trying to avoid fried food    Exercise Routine: treadmill at home, has knee pain and has appt with rheumatology and procedure in January     Additional Contributory Factors: stress- family health concerns, job stress     Weight  224 lb (initial)    PHARMACY ASSESSMENT  Pertinent PMH Review:  - PMH of Pancreatitis: No  - PMH of Retinopathy: No  - PMH of MTC: No    Allergies: Avelox [moxifloxacin], Bacitracin, Benzonatate, Latex, Oxycodone, and Penicillins     [id number] - [place], [place] - [address] [facility]  [address] [place] [address]  Phone: [phone] Fax: [phone]     [facility] Pharm[facility]  [address] [place], [place] [address]  Phone: [phone] Fax: [phone]    Affordability/Accessibility: weight loss plan madyson, Ozempic, Mounjaro require [place]   Adherence/Organization:  Adverse Effects:    CURRENT PHARMACOTHERAPY  - none     DRUG INTERACTIONS  - No significant drug-drug interactions exist that require adjustment to therapy    LAB  Lab Results   Component Value Date    BILITOT 0.4 09/20/2024    CALCIUM 9.2 09/20/2024    CO2 23 09/20/2024     (H) 09/20/2024    CREATININE 0.57 09/20/2024    GLUCOSE 100 (H) 09/20/2024    ALKPHOS 76 09/20/2024    K 4.4 09/20/2024    PROT 6.8 09/20/2024     09/20/2024    AST 16 09/20/2024    ALT 25 09/20/2024    BUN 15 09/20/2024    ANIONGAP 13 09/20/2024    ALBUMIN 4.3 09/20/2024    LIPASE 31 07/17/2019    EGFR >90 09/20/2024     Lab Results   Component Value Date    TRIG 268  (H) 05/16/2024    CHOL 203 (H) 05/16/2024    LDLCALC 109 (H) 05/16/2024    HDL 40.6 05/16/2024     Lab Results   Component Value Date    HGBA1C 5.7 (H) 09/20/2024       Current Outpatient Medications on File Prior to Visit   Medication Sig Dispense Refill    ALPRAZolam (Xanax) 0.5 mg tablet Take 1 tablet (0.5 mg) by mouth 2 times a day. Prn anxiety 60 tablet 0    atorvastatin (Lipitor) 40 mg tablet Take 1 tablet daily 90 tablet 0    clobetasol (Temovate) 0.05 % ointment Apply topically 2 times a day as needed (skin inflammation). Apply to affected areas on arms/feet as needed 45 g 3    ergocalciferol (Vitamin D-2) 1.25 MG (65005 UT) capsule Take 1 capsule (1,250 mcg) by mouth 1 (one) time per week. 12 capsule 0    famotidine (Pepcid) 40 mg tablet Take 1 tablet (40 mg) by mouth 2 times a day. 30 tablet 1    fluticasone (Flonase) 50 mcg/actuation nasal spray Administer 2 sprays into each nostril once daily. Shake gently. Before first use, prime pump. After use, clean tip and replace cap. 16 g 5    tacrolimus (Protopic) 0.1 % ointment Apply topically 2 times a day as needed (skin inflammation). Apply to affected areas of armpits as needed 60 g 3    tirzepatide, weight loss, (Zepbound) 2.5 mg/0.5 mL injection Inject 2.5 mg under the skin every 7 days. 4 each 1    zolpidem (Ambien) 10 mg tablet Take 1 tablet (10 mg) by mouth as needed at bedtime for sleep. 30 tablet 1     No current facility-administered medications on file prior to visit.       PATIENT EDUCATION/GOALS  - Counseled patient on MOA, expectations, side effects, duration of therapy, contraindications, administration, and monitoring parameters  - Answered all patient questions and concerns; provided [facility] phone number if issues/questions arise    RECOMMENDATIONS/PLAN  PA for Ozempic denied, no GLP coverage for weight loss (Zepbound, Wegovy)  Patient informed of this, discussed cash pay options, patient reports she is unable to move forward due to cost   Refer  back to PCP for further management, patient provided pharmacist phone number if she should wish to discuss cash pay option further    Clinical Pharmacist follow up: not scheduled   Type of Encounter: virtual    Continue all meds under the continuation of care with the referring provider and clinical pharmacy team.    Christin Stafford, PharmD  Clinical Pharmacy Specialist, Primary Care   [phone]    Verbal consent to manage patient's drug therapy was obtained from patient. They were informed they may decline to participate or withdraw from participation in pharmacy services at any time.

## 2024-11-25 ENCOUNTER — TELEPHONE (OUTPATIENT)
Dept: PRIMARY CARE | Facility: CLINIC | Age: 54
End: 2024-11-25
Payer: COMMERCIAL

## 2024-11-25 NOTE — TELEPHONE ENCOUNTER
Patient stated that the medication Ozempic was denied. She wants to know is there another medication you can prescribe?  Metformin perhaps?

## 2024-12-02 DIAGNOSIS — E78.00 PURE HYPERCHOLESTEROLEMIA, UNSPECIFIED: ICD-10-CM

## 2024-12-02 RX ORDER — ATORVASTATIN CALCIUM 40 MG/1
40 TABLET, FILM COATED ORAL DAILY
Qty: 90 TABLET | Refills: 1 | Status: SHIPPED | OUTPATIENT
Start: 2024-12-02

## 2024-12-04 ENCOUNTER — LAB (OUTPATIENT)
Dept: LAB | Facility: LAB | Age: 54
End: 2024-12-04
Payer: COMMERCIAL

## 2024-12-04 ENCOUNTER — OFFICE VISIT (OUTPATIENT)
Dept: RHEUMATOLOGY | Facility: CLINIC | Age: 54
End: 2024-12-04
Payer: COMMERCIAL

## 2024-12-04 VITALS
DIASTOLIC BLOOD PRESSURE: 87 MMHG | HEIGHT: 60 IN | WEIGHT: 225 LBS | HEART RATE: 76 BPM | BODY MASS INDEX: 44.17 KG/M2 | SYSTOLIC BLOOD PRESSURE: 136 MMHG

## 2024-12-04 DIAGNOSIS — M17.12 PRIMARY OSTEOARTHRITIS OF LEFT KNEE: Primary | ICD-10-CM

## 2024-12-04 DIAGNOSIS — M17.12 PRIMARY OSTEOARTHRITIS OF LEFT KNEE: ICD-10-CM

## 2024-12-04 DIAGNOSIS — L20.89 OTHER ATOPIC DERMATITIS: Primary | ICD-10-CM

## 2024-12-04 LAB
CCP IGG SERPL-ACNC: <1 U/ML
CRP SERPL-MCNC: 0.92 MG/DL
ERYTHROCYTE [SEDIMENTATION RATE] IN BLOOD BY WESTERGREN METHOD: 42 MM/H (ref 0–30)
RHEUMATOID FACT SER NEPH-ACNC: <10 IU/ML (ref 0–15)

## 2024-12-04 PROCEDURE — 86140 C-REACTIVE PROTEIN: CPT

## 2024-12-04 PROCEDURE — 86431 RHEUMATOID FACTOR QUANT: CPT

## 2024-12-04 PROCEDURE — 36415 COLL VENOUS BLD VENIPUNCTURE: CPT

## 2024-12-04 PROCEDURE — 85652 RBC SED RATE AUTOMATED: CPT

## 2024-12-04 PROCEDURE — RXMED WILLOW AMBULATORY MEDICATION CHARGE

## 2024-12-04 PROCEDURE — 86200 CCP ANTIBODY: CPT

## 2024-12-04 PROCEDURE — 99204 OFFICE O/P NEW MOD 45 MIN: CPT | Performed by: INTERNAL MEDICINE

## 2024-12-04 PROCEDURE — 3008F BODY MASS INDEX DOCD: CPT | Performed by: INTERNAL MEDICINE

## 2024-12-04 RX ORDER — DUPILUMAB 300 MG/2ML
INJECTION, SOLUTION SUBCUTANEOUS
Qty: 4 ML | Refills: 9 | Status: SHIPPED | OUTPATIENT
Start: 2024-12-04 | End: 2025-12-04

## 2024-12-04 NOTE — LETTER
December 4, 2024     Grant Tuttle MD PhD  [address]  Department Of Dermatology  [facility] [address]    Patient: Kandi Bliss   YOB: 1970   Date of Visit: 12/4/2024       Dear Dr. Grant Tuttle MD PhD:    Thank you for referring Kandi Bliss to me for evaluation. Below are my notes for this consultation.  If you have questions, please do not hesitate to call me. I look forward to following your patient along with you.       Sincerely,     Denis Bedolla MD      CC: No Recipients  ______________________________________________________________________________________    Subjective. Kandi Bliss is a 54 y.o. female who presents for Establish Care (Np- has seen you before but its been over 3 years ).    HPI.  54-year-old female with history of knee DJD, dyshidrotic eczema, obesity, anxiety, migraine headaches and insomnia referred through dermatology for joint pain evaluation.    She has chronic knee pain that gets worse upon getting up and walking.  She stated pain was really bad few months ago and was waking her up at night.  She did not notice much improvement with intra-articular corticosteroid injection.  She received Visco supplement injection in both knees on August 15, 2024.  Week later she also stopped Dupixent.  She states knee pain has improved however believes that Dupixent was causing the joint pain.    She also reports intermittent pain in her hands with numbness and tingling.  She has not noticed swelling of the joints.  She has not noticed improvement of the hand symptoms after stopping the Dupixent.  An EMG obtained in 2019 was consistent with bilateral carpal tunnel syndrome.    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  She does not smoke.  Family history: Mother passed away in August from breast cancer.    Review of Systems   All other systems reviewed and are negative.    Objective    Blood pressure 136/87, pulse 76, height 1.524  m (5'), weight 102 kg (225 lb).    Physical Exam.  Gen. AAO x3, NAD.  HEENT: No pallor or icterus, PERRLA, EOMI. Oropharynx is clear. MM moist,Parotid glands  not enlarged. No cervical lymphadenopathy .  Skin: Dry rashes involving the hands.  Heart: S1, S2/ RRR.   Lungs: CTA B.  Abdomen: Soft, NT/ND, BS regular.  MSK: No.swelling or tenderness of the  upper or lower extremity joints with full ROM, Neck,spine and Aries SI with out tenderness.  Neuro: CN II-XII intact. Sensation to touch intact.Strength 5/5 throughout. DTR 2+ and symmetrical.  Psych:Appropriate mood and behavior  EXT: No edema    Assessment/Plan. 54-year-old female with history of knee DJD, dyshidrotic eczema, obesity, anxiety, migraine headaches and insomnia referred through dermatology for joint pain evaluation.    Knee pain improved after consuming viscosupplements and stopping the Dupixent however she continued to have hand pain, that makes Dupixent related arthralgia doubtful.  Only 3% of the patient developed Dupixent related arthralgia.  Discussed with patient it is possible hand pain is secondary to carpal tunnel syndrome.  Hand orthopedic referral discussed however she declined.  She was advised to take Tylenol Extra Strength 2 pills 2-3 times a day as needed and  continue to follow-up with orthopedist for knee DJD.  Discussed with patient that she can resume Dupixent.  Patient verbalized and in agreement.  She will follow-up with dermatology.       This note was partially generated using the Dragon Voice recognition system. There may be some incorrect wording, spelling and/or spelling errors or punctuation errors that were not corrected prior to committing the note to the medical record.      Problem List Items Addressed This Visit       Primary osteoarthritis of left knee - Primary    Relevant Orders    Citrulline Antibody, IgG    Rheumatoid Factor    Sedimentation Rate    C-Reactive Protein            Active Ambulatory Problems     Diagnosis  Date Noted   • Primary osteoarthritis of left knee 04/19/2024   • Loose body in knee, right knee 04/19/2024   • Arthropathic psoriasis, unspecified (Multi) 05/16/2024   • Moderate alcohol dependence (Multi) 05/16/2024   • Internal derangement of knee 10/16/2024   • Prediabetes 11/21/2024     Resolved Ambulatory Problems     Diagnosis Date Noted   • No Resolved Ambulatory Problems     Past Medical History:   Diagnosis Date   • Abnormal Heart Score CT 07/17/2023   • Anxiety    • Cardiology follow-up encounter 04/21/2023   • GERD (gastroesophageal reflux disease)    • Glaucoma suspect of both eyes    • Hyperlipidemia    • Insomnia    • Internal derangement of knee, unspecified laterality    • Obesity    • Psoriatic arthritis (Multi)    • Sleep apnea    • Tobacco abuse    • Vertigo    • Vitamin D insufficiency        Family History   Problem Relation Name Age of Onset   • Breast cancer Mother     • Atrial fibrillation Mother     • Glaucoma Mother     • Diabetes Mother     • Stroke Father     • Other (tonsillar cancer) Sister Winston    • Parkinsonism Maternal Grandmother         Past Surgical History:   Procedure Laterality Date   • COLONOSCOPY  06/04/2021   • HYSTERECTOMY     • LAPAROTOMY SALPINGO OOPHORECTOMY     • MR HEAD ANGIO WO IV CONTRAST  11/27/2018    MR HEAD ANGIO WO IV CONTRAST 11/27/2018 [facility]   • MR NECK ANGIO WO IV CONTRAST  11/27/2018    MR NECK ANGIO WO IV CONTRAST 11/27/2018 [facility]   • TUBAL LIGATION         Social History     Tobacco Use   Smoking Status Every Day   • Current packs/day: 0.50   • Types: Cigarettes   Smokeless Tobacco Never       Allergies  Avelox [moxifloxacin], Bacitracin, Benzonatate, Latex, Oxycodone, and Penicillins    Current Meds  Current Outpatient Medications   Medication Instructions   • ALPRAZolam (XANAX) 0.5 mg, oral, 2 times daily, Prn anxiety   • atorvastatin (LIPITOR) 40 mg, oral, Daily   • clobetasol (Temovate) 0.05 % ointment Topical, 2 times  daily PRN, Apply to affected areas on arms/feet as needed   • dupilumab (Dupixent Syringe) 300 mg/2 mL prefilled syringe INJECT 300 MG (1 SYRINGE) UNDER THE SKIN EVERY OTHER WEEK, AS DIRECTED   • ergocalciferol (VITAMIN D-2) 1,250 mcg, oral, Once Weekly   • famotidine (PEPCID) 40 mg, oral, 2 times daily   • fluticasone (Flonase) 50 mcg/actuation nasal spray 2 sprays, Each Nostril, Daily, Shake gently. Before first use, prime pump. After use, clean tip and replace cap.   • tacrolimus (Protopic) 0.1 % ointment Topical, 2 times daily PRN, Apply to affected areas of armpits as needed   • tirzepatide (weight loss) (ZEPBOUND) 2.5 mg, subcutaneous, Every 7 days   • zolpidem (AMBIEN) 10 mg, oral, Nightly PRN        Lab Results   Component Value Date    RF <10 05/24/2019    SEDRATE 56 (H) 05/23/2019    CRP 4.61 (A) 05/23/2019    URICACID 6.2 02/09/2024    CKTOTAL 89 07/06/2023        Procedures     Denis Bedolla MD

## 2024-12-04 NOTE — PROGRESS NOTES
Subjective . Kandi Bliss is a 54 y.o. female who presents for Establish Care (Np- has seen you before but its been over 3 years ).    HPI.  54-year-old female with history of knee DJD, dyshidrotic eczema, obesity, anxiety, migraine headaches and insomnia referred through dermatology for joint pain evaluation.    She has chronic knee pain that gets worse upon getting up and walking.  She stated pain was really bad few months ago and was waking her up at night.  She did not notice much improvement with intra-articular corticosteroid injection.  She received Visco supplement injection in both knees on August 15, 2024.  Week later she also stopped Dupixent.  She states knee pain has improved however believes that Dupixent was causing the joint pain.    She also reports intermittent pain in her hands with numbness and tingling.  She has not noticed swelling of the joints.  She has not noticed improvement of the hand symptoms after stopping the Dupixent.  An EMG obtained in 2019 was consistent with bilateral carpal tunnel syndrome.    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  She does not smoke.  Family history: Mother passed away in August from breast cancer.    Review of Systems   All other systems reviewed and are negative.    Objective     Blood pressure 136/87, pulse 76, height 1.524 m (5'), weight 102 kg (225 lb).    Physical Exam.  Gen. AAO x3, NAD.  HEENT: No pallor or icterus, PERRLA, EOMI. Oropharynx is clear. MM moist,Parotid glands  not enlarged. No cervical lymphadenopathy .  Skin: Dry rashes involving the hands.  Heart: S1, S2/ RRR.   Lungs: CTA B.  Abdomen: Soft, NT/ND, BS regular.  MSK: No.swelling or tenderness of the  upper or lower extremity joints with full ROM, Neck,spine and Aries SI with out tenderness.  Neuro: CN II-XII intact. Sensation to touch intact.Strength 5/5 throughout. DTR 2+ and symmetrical.  Psych:Appropriate mood and behavior  EXT: No  edema    Assessment/Plan . 54-year-old female with history of knee DJD, dyshidrotic eczema, obesity, anxiety, migraine headaches and insomnia referred through dermatology for joint pain evaluation.    Knee pain improved after viscosupplement intra-articular injection and stopping the Dupixent however she continued to have hand pain, that makes Dupixent related arthralgia doubtful.  Only 3% of the patient developed Dupixent related arthralgia.  Discussed with patient it is possible hand pain is secondary to carpal tunnel syndrome.  Hand orthopedic referral discussed however she declined.  She was advised to take Tylenol Extra Strength 2 pills 2-3 times a day as needed and  continue to follow-up with orthopedist for knee DJD.  Discussed with patient that she can resume Dupixent.  Patient verbalized and in agreement.  She will follow-up with dermatology.       This note was partially generated using the Dragon Voice recognition system. There may be some incorrect wording, spelling and/or spelling errors or punctuation errors that were not corrected prior to committing the note to the medical record.      Problem List Items Addressed This Visit       Primary osteoarthritis of left knee - Primary    Relevant Orders    Citrulline Antibody, IgG    Rheumatoid Factor    Sedimentation Rate    C-Reactive Protein            Active Ambulatory Problems     Diagnosis Date Noted    Primary osteoarthritis of left knee 04/19/2024    Loose body in knee, right knee 04/19/2024    Arthropathic psoriasis, unspecified (Multi) 05/16/2024    Moderate alcohol dependence (Multi) 05/16/2024    Internal derangement of knee 10/16/2024    Prediabetes 11/21/2024     Resolved Ambulatory Problems     Diagnosis Date Noted    No Resolved Ambulatory Problems     Past Medical History:   Diagnosis Date    Abnormal Heart Score CT 07/17/2023    Anxiety     Cardiology follow-up encounter 04/21/2023    GERD (gastroesophageal reflux disease)     Glaucoma  suspect of both eyes     Hyperlipidemia     Insomnia     Internal derangement of knee, unspecified laterality     Obesity     Psoriatic arthritis (Multi)     Sleep apnea     Tobacco abuse     Vertigo     Vitamin D insufficiency        Family History   Problem Relation Name Age of Onset    Breast cancer Mother      Atrial fibrillation Mother      Glaucoma Mother      Diabetes Mother      Stroke Father      Other (tonsillar cancer) Sister Winston     Parkinsonism Maternal Grandmother         Past Surgical History:   Procedure Laterality Date    COLONOSCOPY  06/04/2021    HYSTERECTOMY      LAPAROTOMY SALPINGO OOPHORECTOMY      MR HEAD ANGIO WO IV CONTRAST  11/27/2018    MR HEAD ANGIO WO IV CONTRAST 11/27/2018 [facility]    MR NECK ANGIO WO IV CONTRAST  11/27/2018    MR NECK ANGIO WO IV CONTRAST 11/27/2018 [facility]    TUBAL LIGATION         Social History     Tobacco Use   Smoking Status Every Day    Current packs/day: 0.50    Types: Cigarettes   Smokeless Tobacco Never       Allergies  Avelox [moxifloxacin], Bacitracin, Benzonatate, Latex, Oxycodone, and Penicillins    Current Meds  Current Outpatient Medications   Medication Instructions    ALPRAZolam (XANAX) 0.5 mg, oral, 2 times daily, Prn anxiety    atorvastatin (LIPITOR) 40 mg, oral, Daily    clobetasol (Temovate) 0.05 % ointment Topical, 2 times daily PRN, Apply to affected areas on arms/feet as needed    dupilumab (Dupixent Syringe) 300 mg/2 mL prefilled syringe INJECT 300 MG (1 SYRINGE) UNDER THE SKIN EVERY OTHER WEEK, AS DIRECTED    ergocalciferol (VITAMIN D-2) 1,250 mcg, oral, Once Weekly    famotidine (PEPCID) 40 mg, oral, 2 times daily    fluticasone (Flonase) 50 mcg/actuation nasal spray 2 sprays, Each Nostril, Daily, Shake gently. Before first use, prime pump. After use, clean tip and replace cap.    tacrolimus (Protopic) 0.1 % ointment Topical, 2 times daily PRN, Apply to affected areas of armpits as needed    tirzepatide (weight  loss) (ZEPBOUND) 2.5 mg, subcutaneous, Every 7 days    zolpidem (AMBIEN) 10 mg, oral, Nightly PRN        Lab Results   Component Value Date    RF <10 05/24/2019    SEDRATE 56 (H) 05/23/2019    CRP 4.61 (A) 05/23/2019    URICACID 6.2 02/09/2024    CKTOTAL 89 07/06/2023        Carlie Bedolla MD

## 2024-12-05 ENCOUNTER — PHARMACY VISIT (OUTPATIENT)
Dept: PHARMACY | Facility: CLINIC | Age: 54
End: 2024-12-05
Payer: COMMERCIAL

## 2024-12-16 ENCOUNTER — APPOINTMENT (OUTPATIENT)
Dept: OBSTETRICS AND GYNECOLOGY | Facility: CLINIC | Age: 54
End: 2024-12-16
Payer: COMMERCIAL

## 2024-12-16 VITALS
SYSTOLIC BLOOD PRESSURE: 132 MMHG | BODY MASS INDEX: 44.17 KG/M2 | DIASTOLIC BLOOD PRESSURE: 57 MMHG | HEIGHT: 60 IN | WEIGHT: 225 LBS

## 2024-12-16 DIAGNOSIS — Z11.3 SCREEN FOR STD (SEXUALLY TRANSMITTED DISEASE): ICD-10-CM

## 2024-12-16 DIAGNOSIS — Z71.6 ENCOUNTER FOR SMOKING CESSATION COUNSELING: ICD-10-CM

## 2024-12-16 DIAGNOSIS — Z01.419 WOMEN'S ANNUAL ROUTINE GYNECOLOGICAL EXAMINATION: Primary | ICD-10-CM

## 2024-12-16 PROCEDURE — 3008F BODY MASS INDEX DOCD: CPT | Performed by: OBSTETRICS & GYNECOLOGY

## 2024-12-16 PROCEDURE — 99406 BEHAV CHNG SMOKING 3-10 MIN: CPT | Performed by: OBSTETRICS & GYNECOLOGY

## 2024-12-16 PROCEDURE — 99396 PREV VISIT EST AGE 40-64: CPT | Performed by: OBSTETRICS & GYNECOLOGY

## 2024-12-16 PROCEDURE — 4004F PT TOBACCO SCREEN RCVD TLK: CPT | Performed by: OBSTETRICS & GYNECOLOGY

## 2024-12-16 RX ORDER — IBUPROFEN 200 MG
1 TABLET ORAL EVERY 24 HOURS
Qty: 30 PATCH | Refills: 0 | Status: SHIPPED | OUTPATIENT
Start: 2024-12-16 | End: 2025-01-15

## 2024-12-16 ASSESSMENT — PAIN SCALES - GENERAL: PAINLEVEL_OUTOF10: 0-NO PAIN

## 2024-12-16 NOTE — PROGRESS NOTES
Kandi Bliss is a 54 y.o. female who is here for a routine exam. PCP = Kaleb Barnett MD  Patient for annual exam..  Has not been sexually active recently.  Would like STD testing done.  Patient is status post total abdominal hysterectomy with bilateral salpingo-oophorectomy.    Review of Systems  Systems negative    Physical Exam  Constitutional:       Appearance: Normal appearance. She is obese.   Genitourinary:      Genitourinary Comments: External genitalia unremarkable  Vagina clear cuff intact  Cervix and uterus surgically absent  No adnexal masses both ovaries are surgically absent  Perineum without lesions or swelling    Breast without masses tenderness or nipple discharge, normal appearance   Breasts:     Breasts are soft.     Right: Normal.      Left: Normal.   HENT:      Head: Normocephalic.      Nose: Nose normal.   Eyes:      Pupils: Pupils are equal, round, and reactive to light.   Cardiovascular:      Rate and Rhythm: Normal rate and regular rhythm.   Pulmonary:      Effort: Pulmonary effort is normal.      Breath sounds: Normal breath sounds.   Abdominal:      General: Abdomen is flat. Bowel sounds are normal.      Palpations: Abdomen is soft.   Musculoskeletal:         General: Normal range of motion.      Cervical back: Normal range of motion and neck supple.   Neurological:      General: No focal deficit present.      Mental Status: She is alert.   Skin:     General: Skin is warm and dry.   Psychiatric:         Mood and Affect: Mood normal.         Behavior: Behavior normal.         Thought Content: Thought content normal.         Judgment: Judgment normal.   Vitals reviewed.     Objective   /57   Ht 1.524 m (5')   Wt 102 kg (225 lb)   BMI 43.94 kg/m²   OB History          2    Para   2    Term   2            AB        Living             SAB        IAB        Ectopic        Multiple        Live Births                      GynHx:  Menarche/Menopause:  13/hysterectomy    Social History     Substance and Sexual Activity   Sexual Activity Defer     STIs: none    Substance:   Tobacco Use: High Risk (12/16/2024)    Patient History     Smoking Tobacco Use: Every Day     Smokeless Tobacco Use: Never     Passive Exposure: Not on file      Social History     Substance and Sexual Activity   Drug Use Never      Social History     Substance and Sexual Activity   Alcohol Use Yes    Alcohol/week: 6.0 standard drinks of alcohol    Types: 6 Cans of beer per week     Abuse: No  Depression Screen:   Negative    Past med hx and past surg hx reviewed and notable for: Abdominal hysterectomy with bilateral salpingo-oophorectomy    Assessment/Plan    Clinically unremarkable annual GYN exam.  Patient occasionally sexually active routine STD testing ordered.  Discussed diet exercise calcium and vitamin D.  Patient had mammogram done about 6 months ago.   Discussed colonoscopy with patient.  She will check with her primary care physician.  Discussed doing Cologuard.  Mammogram ordered  5 to 10-minute discussion as to smoking cessation.  Patient is willing.  Discussed options including counseling going cold turkey or medication.  Will start the patch.  Patient will check back 3 to 4 weeks with progress  Return to office in 1 year or as needed

## 2024-12-18 ENCOUNTER — TELEPHONE (OUTPATIENT)
Dept: PHARMACY | Facility: HOSPITAL | Age: 54
End: 2024-12-18
Payer: COMMERCIAL

## 2024-12-18 DIAGNOSIS — E66.01 CLASS 3 SEVERE OBESITY WITH BODY MASS INDEX (BMI) OF 40.0 TO 44.9 IN ADULT, UNSPECIFIED OBESITY TYPE, UNSPECIFIED WHETHER SERIOUS COMORBIDITY PRESENT: Primary | ICD-10-CM

## 2024-12-18 DIAGNOSIS — E66.813 CLASS 3 SEVERE OBESITY WITH BODY MASS INDEX (BMI) OF 40.0 TO 44.9 IN ADULT, UNSPECIFIED OBESITY TYPE, UNSPECIFIED WHETHER SERIOUS COMORBIDITY PRESENT: Primary | ICD-10-CM

## 2024-12-18 RX ORDER — TIRZEPATIDE 2.5 MG/.5ML
2.5 INJECTION, SOLUTION SUBCUTANEOUS
Qty: 2 ML | Refills: 0 | Status: SHIPPED | OUTPATIENT
Start: 2024-12-18

## 2024-12-18 NOTE — TELEPHONE ENCOUNTER
Pharmacist Clinic: Weight Management    Kandi Bliss was referred to the Clinical Pharmacy Team for weight management.     Referring Provider: Kaleb Barnett MD  Problem List Items Addressed This Visit    None  Visit Diagnoses       Class 3 severe obesity with body mass index (BMI) of 40.0 to 44.9 in adult, unspecified obesity type, unspecified whether serious comorbidity present    -  Primary    Relevant Medications    tirzepatide, weight loss, (Zepbound) 2.5 mg/0.5 mL solution    Other Relevant Orders    Referral to Clinical Pharmacy          HISTORY OF PRESENT ILLNESS    Diet: trying to avoid fried food    Exercise Routine: treadmill at home, has knee pain and has appt with rheumatology and procedure in January     Additional Contributory Factors: stress- family health concerns, job stress     Weight  224 lb (initial)    PHARMACY ASSESSMENT  Pertinent PMH Review:  - PMH of Pancreatitis: No  - PMH of Retinopathy: No  - PMH of MTC: No    Allergies: Avelox [moxifloxacin], Bacitracin, Benzonatate, Latex, Oxycodone, and Penicillins     [facility] - [facility] [address]  [address] [facility] [facility] [address]  Phone: [phone] Fax: [phone]     Specialty Pharmacy  [address] [facility] [address] [address]  Phone: [phone] Fax: [phone]    [facility] Z[facility]OUND [facility] - [facility] [address] [place]  [facility] [address]  Phone: [phone] Fax: [phone]    Affordability/Accessibility: weight loss plan exclusion, Ozempic, Mounjaro require PA   Adherence/Organization: None  Adverse Effects: None    CURRENT PHARMACOTHERAPY  - none     DRUG INTERACTIONS  - No significant drug-drug interactions exist that require adjustment to therapy    LAB  Lab Results   Component Value Date    BILITOT 0.4 09/20/2024    CALCIUM 9.2 09/20/2024    CO2 23 09/20/2024     (H) 09/20/2024    CREATININE 0.57 09/20/2024     GLUCOSE 100 (H) 09/20/2024    ALKPHOS 76 09/20/2024    K 4.4 09/20/2024    PROT 6.8 09/20/2024     09/20/2024    AST 16 09/20/2024    ALT 25 09/20/2024    BUN 15 09/20/2024    ANIONGAP 13 09/20/2024    ALBUMIN 4.3 09/20/2024    LIPASE 31 07/17/2019    EGFR >90 09/20/2024     Lab Results   Component Value Date    TRIG 268 (H) 05/16/2024    CHOL 203 (H) 05/16/2024    LDLCALC 109 (H) 05/16/2024    HDL 40.6 05/16/2024     Lab Results   Component Value Date    HGBA1C 5.7 (H) 09/20/2024       Current Outpatient Medications on File Prior to Visit   Medication Sig Dispense Refill    ALPRAZolam (Xanax) 0.5 mg tablet Take 1 tablet (0.5 mg) by mouth 2 times a day. Prn anxiety 60 tablet 0    atorvastatin (Lipitor) 40 mg tablet Take 1 tablet daily 90 tablet 1    clobetasol (Temovate) 0.05 % ointment Apply topically 2 times a day as needed (skin inflammation). Apply to affected areas on arms/feet as needed 45 g 3    dupilumab (Dupixent Syringe) 300 mg/2 mL prefilled syringe INJECT 300 MG (1 SYRINGE) UNDER THE SKIN EVERY OTHER WEEK, AS DIRECTED 4 mL 9    ergocalciferol (Vitamin D-2) 1.25 MG (03231 UT) capsule Take 1 capsule (1,250 mcg) by mouth 1 (one) time per week. 12 capsule 0    famotidine (Pepcid) 40 mg tablet Take 1 tablet (40 mg) by mouth 2 times a day. 30 tablet 1    fluticasone (Flonase) 50 mcg/actuation nasal spray Administer 2 sprays into each nostril once daily. Shake gently. Before first use, prime pump. After use, clean tip and replace cap. 16 g 5    nicotine (Nicoderm CQ) 14 mg/24 hr patch Place 1 patch over 24 hours on the skin once every 24 hours. 30 patch 0    tacrolimus (Protopic) 0.1 % ointment Apply topically 2 times a day as needed (skin inflammation). Apply to affected areas of armpits as needed 60 g 3    zolpidem (Ambien) 10 mg tablet Take 1 tablet (10 mg) by mouth as needed at bedtime for sleep. 30 tablet 1    [DISCONTINUED] tirzepatide, weight loss, (Zepbound) 2.5 mg/0.5 mL injection Inject 2.5 mg  under the skin every 7 days. 4 each 1     No current facility-administered medications on file prior to visit.       PATIENT EDUCATION/GOALS  - Counseled patient on MOA, expectations, side effects, duration of therapy, contraindications, administration, and monitoring parameters  - Answered all patient questions and concerns; provided [facility] phone number if issues/questions arise    RECOMMENDATIONS/PLAN  PA for Ozempic denied, weight loss med plan exclusion   Patient would like to move forward with Zepbound vials  Send prescription for Zepbound 2.5 mg weekly vials direct to   Education provided on use of vials, instructed to order syringes/needles from   Zepbound Education:   Counseled patient on Zepbound MOA, expectations, side effects, duration of therapy, administration, and monitoring parameters.  Provided detailed dosing and administration counseling to ensure proper technique.   Counseled patient on the benefits of GLP-1ra glycemic control and weight loss  Reviewed storage requirements of Zepbound when not in use, and when to administer the medication if a dose is missed.  Advised patient that they may experience improved satiety after meals and portion sizes of meals may be reduced as doses of Zepound increase.    Clinical Pharmacist follow up: 1/15/25 940   Type of Encounter: virtual    Continue all meds under the continuation of care with the referring provider and clinical pharmacy team.    Christin Stafford, PharmD  Clinical Pharmacy Specialist, Primary Care   [phone]    Verbal consent to manage patient's drug therapy was obtained from patient. They were informed they may decline to participate or withdraw from participation in pharmacy services at any time.

## 2024-12-19 ENCOUNTER — SPECIALTY PHARMACY (OUTPATIENT)
Dept: PHARMACY | Facility: CLINIC | Age: 54
End: 2024-12-19

## 2024-12-23 ENCOUNTER — APPOINTMENT (OUTPATIENT)
Dept: RHEUMATOLOGY | Facility: CLINIC | Age: 54
End: 2024-12-23
Payer: COMMERCIAL

## 2024-12-24 ENCOUNTER — SPECIALTY PHARMACY (OUTPATIENT)
Dept: PHARMACY | Facility: CLINIC | Age: 54
End: 2024-12-24

## 2024-12-24 PROCEDURE — RXMED WILLOW AMBULATORY MEDICATION CHARGE

## 2024-12-26 DIAGNOSIS — L30.1 ECZEMA, DYSHIDROTIC: ICD-10-CM

## 2024-12-31 ENCOUNTER — PHARMACY VISIT (OUTPATIENT)
Dept: PHARMACY | Facility: CLINIC | Age: 54
End: 2024-12-31
Payer: COMMERCIAL

## 2024-12-31 RX ORDER — CLOBETASOL PROPIONATE 0.5 MG/G
OINTMENT TOPICAL 2 TIMES DAILY PRN
Qty: 45 G | Refills: 3 | Status: SHIPPED | OUTPATIENT
Start: 2024-12-31

## 2024-12-31 RX ORDER — TACROLIMUS 1 MG/G
OINTMENT TOPICAL 2 TIMES DAILY PRN
Qty: 60 G | Refills: 3 | Status: SHIPPED | OUTPATIENT
Start: 2024-12-31 | End: 2025-12-31

## 2025-01-02 ENCOUNTER — TELEPHONE (OUTPATIENT)
Dept: PREADMISSION TESTING | Facility: HOSPITAL | Age: 55
End: 2025-01-02

## 2025-01-02 ENCOUNTER — APPOINTMENT (OUTPATIENT)
Dept: PREADMISSION TESTING | Facility: HOSPITAL | Age: 55
End: 2025-01-02
Payer: COMMERCIAL

## 2025-01-06 DIAGNOSIS — G47.00 INSOMNIA, UNSPECIFIED: ICD-10-CM

## 2025-01-06 RX ORDER — ZOLPIDEM TARTRATE 10 MG/1
10 TABLET ORAL NIGHTLY PRN
Qty: 30 TABLET | Refills: 1 | Status: SHIPPED | OUTPATIENT
Start: 2025-01-06

## 2025-01-07 ENCOUNTER — APPOINTMENT (OUTPATIENT)
Dept: PREADMISSION TESTING | Facility: HOSPITAL | Age: 55
End: 2025-01-07
Payer: COMMERCIAL

## 2025-01-08 ENCOUNTER — TELEPHONE (OUTPATIENT)
Dept: PRIMARY CARE | Facility: CLINIC | Age: 55
End: 2025-01-08
Payer: COMMERCIAL

## 2025-01-08 ENCOUNTER — TELEMEDICINE (OUTPATIENT)
Dept: PRIMARY CARE | Facility: CLINIC | Age: 55
End: 2025-01-08
Payer: COMMERCIAL

## 2025-01-08 ENCOUNTER — APPOINTMENT (OUTPATIENT)
Dept: PRIMARY CARE | Facility: CLINIC | Age: 55
End: 2025-01-08
Payer: COMMERCIAL

## 2025-01-08 DIAGNOSIS — F32.A DEPRESSION, UNSPECIFIED DEPRESSION TYPE: Primary | ICD-10-CM

## 2025-01-08 PROCEDURE — 99214 OFFICE O/P EST MOD 30 MIN: CPT | Performed by: INTERNAL MEDICINE

## 2025-01-08 RX ORDER — BUPROPION HYDROCHLORIDE 100 MG/1
100 TABLET ORAL 2 TIMES DAILY
Qty: 60 TABLET | Refills: 11 | Status: SHIPPED | OUTPATIENT
Start: 2025-01-08 | End: 2026-01-08

## 2025-01-08 NOTE — TELEPHONE ENCOUNTER
Pt requesting Rx for her attention span.  Pt explained that ever since her mother  she has been in a fog ever since.  It is hard for her to concentrate at work.  She has/is seeing someone in bereavement therapy.  But the counselor is not a doctor; can't prescribe meds.

## 2025-01-08 NOTE — PROGRESS NOTES
Subjective   Patient ID: Kandi Bliss is a 54 y.o. female who presents for No chief complaint on file..    HPI    I performed this visit using real-time telehealth tools, including an audio/video connection between Kandi Bliss and myself Dr Givens in office [facility], [place], [place]  Patient on with me on a virtual visit  Anxiety ongoing with her mother's passing late last year   She joined a bereavement group she gets side tracked on everything   Has not had one since [place]     Review of Systems  .............General: Denies fever, chills, night sweats,  Eyes: Negative for recent visual changes  Ears, Nose, Throat :  Negative for hearing changes, sinus discomfort  Dermatologic: Negative for new skin conditions, rash  Respiratory: Negative for wheezing, shortness of breath, cough  Cardiovascular: Negative for chest pain, palpitations, or leg swelling  Gastrointestinal: Negative for nausea/vomiting, abdominal pain, changes in bowel habits  Genitourinary Negative for Urinary Incontinence  urgency , frequency, discomfort   Musculoskeletal: see hpi  Neurological: Negative for headaches, dizziness   Previous history  Past Medical History:   Diagnosis Date    Abnormal Heart Score CT 07/17/2023    Total  24.31    Anxiety     Cardiology follow-up encounter 04/21/2023    Quinn NEWELL MD    GERD (gastroesophageal reflux disease)     Glaucoma suspect of both eyes     Hyperlipidemia     Insomnia     Internal derangement of knee, unspecified laterality     Loose body in knee, right knee     Plan: Right Knee Arthroscopy; Meniscectomy 11/14/24    Moderate alcohol dependence (Multi)     Obesity     Psoriatic arthritis (Multi)     Sleep apnea     Tobacco abuse     Vertigo     Vitamin D insufficiency      Past Surgical History:   Procedure Laterality Date    COLONOSCOPY  06/04/2021    HYSTERECTOMY      LAPAROTOMY SALPINGO OOPHORECTOMY      MR HEAD ANGIO WO IV CONTRAST  11/27/2018    MR HEAD ANGIO  WO IV CONTRAST 11/27/2018 [facility]    MR NECK ANGIO WO IV CONTRAST  11/27/2018    MR NECK ANGIO WO IV CONTRAST 11/27/2018 [facility]    TUBAL LIGATION       Social History     Tobacco Use    Smoking status: Every Day     Current packs/day: 0.50     Types: Cigarettes    Smokeless tobacco: Never    Tobacco comments:     Discussed all alternatives for treatment.  Medication versus counseling versus cold turkey.  Will start on nicotine patch 5 minutes   Vaping Use    Vaping status: Never Used   Substance Use Topics    Alcohol use: Yes     Alcohol/week: 6.0 standard drinks of alcohol     Types: 6 Cans of beer per week    Drug use: Never     Family History   Problem Relation Name Age of Onset    Breast cancer Mother      Atrial fibrillation Mother      Glaucoma Mother      Diabetes Mother      Stroke Father      Other (tonsillar cancer) Sister Winston     Parkinsonism Maternal Grandmother       Allergies   Allergen Reactions    Avelox [Moxifloxacin] Diarrhea    Bacitracin Rash    Benzonatate Unknown     PT'S DOESN'T REMEMBER    Latex Itching and Rash    Oxycodone Itching    Penicillins Hives     Current Outpatient Medications   Medication Instructions    ALPRAZolam (XANAX) 0.5 mg, oral, 2 times daily, Prn anxiety    atorvastatin (LIPITOR) 40 mg, oral, Daily    clobetasol (Temovate) 0.05 % ointment Topical, 2 times daily PRN, Apply to affected areas on arms/feet as needed    dupilumab (Dupixent Syringe) 300 mg/2 mL prefilled syringe INJECT 300 MG (1 SYRINGE) UNDER THE SKIN EVERY OTHER WEEK, AS DIRECTED    ergocalciferol (VITAMIN D-2) 1,250 mcg, oral, Once Weekly    famotidine (PEPCID) 40 mg, oral, 2 times daily    fluticasone (Flonase) 50 mcg/actuation nasal spray 2 sprays, Each Nostril, Daily, Shake gently. Before first use, prime pump. After use, clean tip and replace cap.    nicotine (Nicoderm CQ) 14 mg/24 hr patch 1 patch, transdermal, Every 24 hours    tacrolimus (Protopic) 0.1 % ointment Topical,  2 times daily PRN, Apply to affected areas of armpits as needed    Zepbound 2.5 mg, subcutaneous, Every 7 days    zolpidem (AMBIEN) 10 mg, oral, Nightly PRN       Objective       Physical Exam  via [employer] [facility]  General: Well groomed, well nourished , speaks full sentences  Alert Cooperative , no apparent distress   Skin: Good color does not appear dehydrated   Eyes: Extra ocular muscle movements intact, anicteric sclerae  Neck: Supple, with good range of motion looking behind her and moving head sideways to cough   Neurological: Alert, oriented        Assessment/Plan   Kandi Bliss is a 54 y.o. female who presents for the concerns below:    Problem List Items Addressed This Visit    None  DEPRESSION - unable to focus , need good sleep hygience  She has quit smoking since [place]   FOCUS likely worse with grief  TOBACCO USE she quit 9 days ago   Bupropion will hopefully help all of the above, with the latter helping her keep from restarting smoking again  No hx of sz   Ffup with me in 1-2 months she will continue attending the grieving group once a month   Time Spent  with patient:  7  minutes of which greater than 50 percent was spent counselling and coordinating care.      Discussed with:   Return in :    Portions of this note were generated using digital voice recognition software, and may contain grammatical errors       Kaleb Barnett MD  01/08/25  2:28 PM

## 2025-01-15 ENCOUNTER — APPOINTMENT (OUTPATIENT)
Dept: PHARMACY | Facility: HOSPITAL | Age: 55
End: 2025-01-15
Payer: COMMERCIAL

## 2025-01-15 DIAGNOSIS — E66.813 CLASS 3 SEVERE OBESITY WITH BODY MASS INDEX (BMI) OF 40.0 TO 44.9 IN ADULT, UNSPECIFIED OBESITY TYPE, UNSPECIFIED WHETHER SERIOUS COMORBIDITY PRESENT: ICD-10-CM

## 2025-01-15 DIAGNOSIS — E66.01 CLASS 3 SEVERE OBESITY WITH BODY MASS INDEX (BMI) OF 40.0 TO 44.9 IN ADULT, UNSPECIFIED OBESITY TYPE, UNSPECIFIED WHETHER SERIOUS COMORBIDITY PRESENT: ICD-10-CM

## 2025-01-16 ENCOUNTER — TELEMEDICINE CLINICAL SUPPORT (OUTPATIENT)
Dept: PHARMACY | Facility: HOSPITAL | Age: 55
End: 2025-01-16
Payer: COMMERCIAL

## 2025-01-16 DIAGNOSIS — L20.89 OTHER ATOPIC DERMATITIS: ICD-10-CM

## 2025-01-16 RX ORDER — DUPILUMAB 300 MG/2ML
INJECTION, SOLUTION SUBCUTANEOUS
Qty: 4 ML | Refills: 7 | Status: SHIPPED | OUTPATIENT
Start: 2025-01-16 | End: 2026-01-16

## 2025-01-16 NOTE — PROGRESS NOTES
"[facility] Clinical Note  Patient Reassessment     Introduction  Kandi Bliss is a 54 y.o. female who is on the specialty pharmacy service for management of: Dermatology Core.    [facility] supplied medication: Dupixent 300 mg every 28 days    Duration of therapy: Maintenance    The most recent encounter visit with the referring prescriber Dr. Grant Tuttle on 10/29/2024 was reviewed.  Pharmacy will continue to collaborate in the care of this patient with the referring prescriber.    Discussion  Kandi was contacted on 1/16/2025 at 1:30 PM for a pharmacy visit with encounter number [id number] from:   [facility]  [address]  [facility] [address]  Dept: [phone]  Dept Fax: [phone]  Loc: [phone]  Kandi consented to a/an Telephone visit, which was performed.    Efficacy  Patient has developed new symptoms of condition: No  Patient/caregiver feels medication is affecting the disease state: Kandi reports her hands are clearing back up after restarting Dupixent    Goals  Provided education on goals and possible outcomes of therapy:  Adherence with therapy  Timely completion of appropriate labs  Timely and appropriate follow up with provider  Identify and address medication interactions with presciption medications, OTC medications and supplements  Optimize or maintain quality of life  Dermatology: Prevent or reduce disease flares  Reduce use of topical agents (corticosteroids or other \"prn\" agents)  Reduce pain, itchiness, inflammation and body surface area affected by atopic dermatitis  Patient status for the above goal(s): Kandi reports her skin on hands has cleared up and she has been stable; she reports using topical medication every day    Tolerance  Patient has experienced side effects from this medication: No  Changes to current therapy regimen: No    The follow-up timeline was discussed. Every " person responds to and reacts to therapy differently. Patient should be assessed for efficacy and tolerability in approximately: 6 months    Adherence  Patient Information  Informant: Self (Patient)  Demonstrates Understanding of Importance of Adherence: Yes  Does the patient have any barriers to self-administration (including physical and mental?): No  Medication Information  Medication: dupilumab (Dupixent Syringe)  Patient Reported Missed Doses in the Last 4 Weeks: 0  Estimated Medication Adherence Level: Good  Adherence Estimation Source: Claims history  Barriers to Adherence: No Problems identified   The importance of adherence was discussed and patient/caregiver was advised to take the medication as prescribed by their provider. Encouraged patient/caregiver to call physician's office or specialty pharmacy if they have a question regarding a missed dose.    General Assessment  Changes to home medications, OTCs or supplements: No  Current Outpatient Medications   Medication Sig Dispense Refill    ALPRAZolam (Xanax) 0.5 mg tablet Take 1 tablet (0.5 mg) by mouth 2 times a day. Prn anxiety 60 tablet 0    atorvastatin (Lipitor) 40 mg tablet Take 1 tablet daily 90 tablet 1    buPROPion (Wellbutrin) 100 mg tablet Take 1 tablet (100 mg) by mouth 2 times a day. 60 tablet 11    clobetasol (Temovate) 0.05 % ointment Apply topically 2 times a day as needed (skin inflammation). Apply to affected areas on arms/feet as needed 45 g 3    dupilumab (Dupixent Syringe) 300 mg/2 mL prefilled syringe INJECT 300 MG (1 SYRINGE) UNDER THE SKIN EVERY OTHER WEEK, AS DIRECTED 4 mL 9    ergocalciferol (Vitamin D-2) 1.25 MG (26825 UT) capsule Take 1 capsule (1,250 mcg) by mouth 1 (one) time per week. 12 capsule 0    famotidine (Pepcid) 40 mg tablet Take 1 tablet (40 mg) by mouth 2 times a day. 30 tablet 1    fluticasone (Flonase) 50 mcg/actuation nasal spray Administer 2 sprays into each nostril once daily. Shake gently. Before first use,  prime pump. After use, clean tip and replace cap. 16 g 5    nicotine (Nicoderm CQ) 14 mg/24 hr patch Place 1 patch over 24 hours on the skin once every 24 hours. 30 patch 0    tacrolimus (Protopic) 0.1 % ointment Apply topically 2 times a day as needed (skin inflammation). Apply to affected areas of armpits as needed 60 g 3    tirzepatide, weight loss, (Zepbound) 2.5 mg/0.5 mL solution Inject 2.5 mg under the skin every 7 days. 2 mL 0    zolpidem (Ambien) 10 mg tablet Take 1 tablet (10 mg) by mouth as needed at bedtime for sleep. 30 tablet 1     No current facility-administered medications for this visit.     Reported new allergies: No  Reported new medical conditions: No  Additional monitoring reviewed: N/A  Is laboratory follow up needed? No    Advised to contact the pharmacy if there are any changes to the patient's medication list, including prescriptions, OTC medications, herbal products, or supplements.    Impression/Plan  This patient has not been identified as high risk due to Lack of high risk qualifiers.  The following action was taken: N/A.    QOL/Patient Satisfaction  Rate your quality of life on scale of 1-10: 7  Rate your satisfaction with  Specialty Pharmacy on scale of 1-10: 10 - Completely satisfied    Provided contact information ([phone]) for [facility] and reviewed dispensing process, refill timeline and patient management follow up. Confirmed understanding of education conducted during assessment. All questions and concerns were addressed and patient/caregiver was encouraged to reach out for additional questions or concerns.    Based on the patient's diagnosis, medication list, progress towards goals, adherence, tolerance, and medication list, medication remains appropriate: I abdirashid LEBLANC, PharmD

## 2025-01-17 PROCEDURE — RXMED WILLOW AMBULATORY MEDICATION CHARGE

## 2025-01-17 NOTE — PROGRESS NOTES
"[facility]  Psychiatry  Progress Note    Patient Name: Kim Schroeder  MRN: [id number]   Code Status: Full Code  Admission Date: 10/17/2022  Hospital Length of Stay: 2 days  Expected Discharge Date:   Attending Physician: Blayne Gamble MD  Primary Care Provider: Roney Marcelino MD    Current Legal Status: Physician's Emergency Certificate (PEC)    Patient information was obtained from patient.       Subjective:     Patient is a 33 y.o., female, presents with:    Principal Problem:Bipolar affective disorder, current episode depressed    Chief Complaint: I want to go home    HPI: Kim Schroeder is a 33 y.o. female placed under a PEC at [facility]. She was reportedly OPC'd by her  for "swallowing a handful of pills" (unknown amount) in a possible suicide attempt. He did say that she spit them out but was unsure how many she actually swallowed. Upon interview she stated that she was extremely upset over being placed in a hospital. She stated that she was not suicidal and that she was "trying to get my 's attention". She cried and stated "I can dance naked in front of the tv and he would just tell me to move". Reports that her  almost  from Covid pneumonia one year ago and after getting off of the vent he was left with drop foot and a paralyzed arm. He has been unable to return to work and "sits around all day doing absolutely nothing". Claims that she is in college online and tends to her three children as well as the house. She does have a history of bipolar disorder and is medication compliant. She sees Pamela Rose monthly as well as her individual therapist. She denies ever trying to hurt herself before and claims that she was not trying to harm herself this time either. She did verbalize feeling "like a zombie" with her current medication regimen and is forced to take "Adderall every morning just to start moving". " Pharmacist Clinic: Weight Management    Kandi Bilss was referred to the Clinical Pharmacy Team for weight management.     Referring Provider: Kaleb Barnett MD  Problem List Items Addressed This Visit    None  Visit Diagnoses       Class 3 severe obesity with body mass index (BMI) of 40.0 to 44.9 in adult, unspecified obesity type, unspecified whether serious comorbidity present        Relevant Orders    Referral to Clinical Pharmacy          HISTORY OF PRESENT ILLNESS    Diet: trying to avoid fried food    Exercise Routine: treadmill at home, has knee pain and has appt with rheumatology and procedure in January     Additional Contributory Factors: stress- family health concerns, job stress     Weight  224 lb (initial)    PHARMACY ASSESSMENT  Pertinent PMH Review:  - PMH of Pancreatitis: No  - PMH of Retinopathy: No  - PMH of MTC: No    Allergies: Avelox [moxifloxacin], Bacitracin, Benzonatate, Latex, Oxycodone, and Penicillins     [id number] - [facility] [address]  [address] [facility] [address]  Phone: [phone] Fax: [phone]     Specialty Pharmacy  [address] [place], [place] [address]  Phone: [phone] Fax: [phone]    [facility] [place] [address] [address]   [address] [place]  [facility] [address]  Phone: [phone] Fax: [phone]    Affordability/Accessibility: weight loss plan exclusion, Ozempic PA denied, patient moving forward with Zepbound vial cash pay direct from   Adherence/Organization: None  Adverse Effects: None    CURRENT PHARMACOTHERAPY  - none     DRUG INTERACTIONS  - No significant drug-drug interactions exist that require adjustment to therapy    LAB  Lab Results   Component Value Date    BILITOT 0.4 09/20/2024    CALCIUM 9.2 09/20/2024    CO2 23 09/20/2024     (H) 09/20/2024    CREATININE 0.57 09/20/2024    GLUCOSE 100 (H) 09/20/2024    ALKPHOS 76  "After review of medications, I will make appropriate adjustments. She denies any psychosis. Her appetite and sleep patterns are stable. We will admit for medication management and for the benefit of psychotherapy sessions.       Hospital Course: She was admitted to the psych unit and monitored for safety. Her meds were reconciled and adjusted. Klonopin was decreased, seroquel and trileptal were stopped and trazodone was started. She was provided with therapy.    10/18/22-She was admitted for reportedly attempting to swallow pills in a suicide attempt. She sees a NP for psych meds and is on Klonopin, Gabapentin, Adderall and reportedly gets medical marijuana. Her overdose risk score is 740 and risk of her prescribed meds was reviewed with patient. "It was a joke that went real bad, he thought I did take them." She says she pretended to take a handful of pills as a joke and her  did not realize it was a joke. Says she spit the pills back into the cup. She denies feeling depressed and has poor insight. She admits to stress caring for 3 children and her  who is on disability.  is not talking to her currently but she says she can return home after discharge. She denies si/hi. Denies psychosis. Tolerating meds currently and on a lower dose of Klonopin. BP has been running low and she is also on propranolol. Slept a lot here last night and tired today. Will decrease trazodone and propranolol due to fatigue and low BP and monitor closely on meds. Encourage group attendence.    10/19/22- told her he left and took the kids and was going to get a restraining on her.  has not been answering the phone. She was emotional about this yesterday. She isolates in her room. She wants to go home but is not coping well with her stressors. She has poor impulse control and takes little responsibilities for her actions. Slept well last night and was sluggish this morning. "I sleep all day" and does not " 09/20/2024    K 4.4 09/20/2024    PROT 6.8 09/20/2024     09/20/2024    AST 16 09/20/2024    ALT 25 09/20/2024    BUN 15 09/20/2024    ANIONGAP 13 09/20/2024    ALBUMIN 4.3 09/20/2024    LIPASE 31 07/17/2019    EGFR >90 09/20/2024     Lab Results   Component Value Date    TRIG 268 (H) 05/16/2024    CHOL 203 (H) 05/16/2024    LDLCALC 109 (H) 05/16/2024    HDL 40.6 05/16/2024     Lab Results   Component Value Date    HGBA1C 5.7 (H) 09/20/2024       Current Outpatient Medications on File Prior to Visit   Medication Sig Dispense Refill    ALPRAZolam (Xanax) 0.5 mg tablet Take 1 tablet (0.5 mg) by mouth 2 times a day. Prn anxiety 60 tablet 0    atorvastatin (Lipitor) 40 mg tablet Take 1 tablet daily 90 tablet 1    buPROPion (Wellbutrin) 100 mg tablet Take 1 tablet (100 mg) by mouth 2 times a day. 60 tablet 11    clobetasol (Temovate) 0.05 % ointment Apply topically 2 times a day as needed (skin inflammation). Apply to affected areas on arms/feet as needed 45 g 3    ergocalciferol (Vitamin D-2) 1.25 MG (28298 UT) capsule Take 1 capsule (1,250 mcg) by mouth 1 (one) time per week. 12 capsule 0    famotidine (Pepcid) 40 mg tablet Take 1 tablet (40 mg) by mouth 2 times a day. 30 tablet 1    fluticasone (Flonase) 50 mcg/actuation nasal spray Administer 2 sprays into each nostril once daily. Shake gently. Before first use, prime pump. After use, clean tip and replace cap. 16 g 5    nicotine (Nicoderm CQ) 14 mg/24 hr patch Place 1 patch over 24 hours on the skin once every 24 hours. 30 patch 0    tacrolimus (Protopic) 0.1 % ointment Apply topically 2 times a day as needed (skin inflammation). Apply to affected areas of armpits as needed 60 g 3    tirzepatide, weight loss, (Zepbound) 2.5 mg/0.5 mL solution Inject 2.5 mg under the skin every 7 days. 2 mL 0    zolpidem (Ambien) 10 mg tablet Take 1 tablet (10 mg) by mouth as needed at bedtime for sleep. 30 tablet 1    [DISCONTINUED] dupilumab (Dupixent Syringe) 300 mg/2 mL  attend groups. She is resistant to care and minimizes her problems. No side effects notes but her BP has been low, systolic BP in the 90s. No withdrawals. Discontinue propranolol due to low BP as it was given for migraines. Monitor on other meds despite polypharmacy. Will not give klonopin script upon discharge given she was prescribed it at home and filled 2mg pills on 9/23/22.      Patient was seen via video telemedicine and consented to the interview. The patient was located in [place], [place] and the provider was located in [place], [place].    Psychiatric Mental Status Exam:  General Appearance: appears stated age, well-developed, well-nourished  Arousal: alert  Behavior: cooperative  Movements and Motor Activity: no abnormal involuntary movements noted  Orientation: oriented to person, place, time, and situation  Speech: normal rate, normal rhythm, normal volume, normal tone  Mood: anxious  Affect: mood-congruent  Thought Process: linear  Associations: intact  Thought Content and Perceptions: no suicidal ideation, no homicidal ideation, no auditory hallucinations, no visual hallucinations, no paranoid ideation, no ideas of reference, no evidence of delusions or psychosis  Recent and Remote Memory: recent memory intact, remote memory intact  Attention and Concentration: intact, attentive to conversation  Fund of Knowledge: intact, aware of current events, vocabulary appropriate  Insight: impaired  Judgment: impaired    Family History       Problem Relation (Age of Onset)    Cervical cancer Mother    Pancreatic cancer Maternal Grandmother          Tobacco Use    Smoking status: Some Days     Types: Cigarettes    Smokeless tobacco: Not on file   Substance and Sexual Activity    Alcohol use: Yes     Comment: rarely    Drug use: Yes     Types: Marijuana    Sexual activity: Yes     Partners: Male     Psychotherapeutics (From admission, onward)      Start     Stop Route Frequency Ordered    10/18/22 2100  traZODone  prefilled syringe INJECT 300 MG (1 SYRINGE) UNDER THE SKIN EVERY OTHER WEEK, AS DIRECTED 4 mL 9     No current facility-administered medications on file prior to visit.       PATIENT EDUCATION/GOALS  - Counseled patient on MOA, expectations, side effects, duration of therapy, contraindications, administration, and monitoring parameters  - Answered all patient questions and concerns; provided [facility] phone number if issues/questions arise    RECOMMENDATIONS/PLAN  Patient would like to move forward with Zepbound vials, prescription sent for Zepbound 2.5 mg weekly vials direct to  last visit  Patient has not yet received the medication, she states she has not received correspondence from them regarding the prescription  Per Debbie the patient had not coordinated payment and delivery via the text sent, requested link be sent to patient email address  Patient expressed understanding   Zepbound Education:   Counseled patient on Zepbound MOA, expectations, side effects, duration of therapy, administration, and monitoring parameters.  Provided detailed dosing and administration counseling to ensure proper technique.   Counseled patient on the benefits of GLP-1ra glycemic control and weight loss  Reviewed storage requirements of Zepbound when not in use, and when to administer the medication if a dose is missed.  Advised patient that they may experience improved satiety after meals and portion sizes of meals may be reduced as doses of Zepound increase.    Clinical Pharmacist follow up: 2/11/25 1120 am   Type of Encounter: virtual    Continue all meds under the continuation of care with the referring provider and clinical pharmacy team.    Christin Stafford, PharmD  Clinical Pharmacy Specialist, Primary Care   [phone]    Verbal consent to manage patient's drug therapy was obtained from patient. They were informed they may decline to participate or withdraw from participation in pharmacy services at any time.     "tablet 100 mg         -- Oral Nightly 10/18/22 0823    10/17/22 2100  ziprasidone capsule 160 mg         -- Oral Nightly 10/17/22 1037    10/17/22 1145  venlafaxine 24 hr capsule 300 mg         -- Oral Daily 10/17/22 1037             Review of Systems  Objective:     Vital Signs (Most Recent):  Temp: 98 °F (36.7 °C) (10/19/22 0609)  Pulse: 79 (10/19/22 0609)  Resp: 18 (10/19/22 0609)  BP: 91/62 (10/19/22 0609)  SpO2: 97 % (10/19/22 0609) Vital Signs (24h Range):  Temp:  [98 °F (36.7 °C)-98.4 °F (36.9 °C)] 98 °F (36.7 °C)  Pulse:  [64-79] 79  Resp:  [18] 18  SpO2:  [97 %-99 %] 97 %  BP: ()/(62-80) 91/62     Height: 5' 3" (160 cm)  Weight: 92.6 kg (204 lb 2.3 oz)  Body mass index is 36.16 kg/m².    No intake or output data in the 24 hours ending 10/19/22 1338    Physical Exam     Significant Labs: Last 72 Hours:   Recent Lab Results         10/18/22  0425        Cholesterol 253       Gluc Fast 92       HDL 64       LDL Cholesterol External 162.00       Syphilis Antibody Nonreactive       Total Cholesterol/HDL Ratio 4       Triglycerides 135       Very Low Density Lipoprotein 27               Significant Imaging: I have reviewed all pertinent imaging results/findings within the past 24 hours.       Scheduled Medications:   clonazePAM  1 mg Oral BID    gabapentin  600 mg Oral TID    nicotine  1 patch Transdermal Daily    traZODone  100 mg Oral QHS    venlafaxine  300 mg Oral Daily    ziprasidone  160 mg Oral QHS       PRN Medications:  acetaminophen, albuterol, aluminum-magnesium hydroxide-simethicone, hydrOXYzine pamoate, ibuprofen, magnesium hydroxide 400 mg/5 ml, sumatriptan    Review of patient's allergies indicates:  No Known Allergies    Assessment/Plan:     * Bipolar affective disorder, current episode depressed  Stop propranolol due to low BP and monitor.         Need for Continued Hospitalization:  Protective inpatient psychiatric hospitalization required while a safe disposition plan is enacted. " and Requires ongoing hospitalization for stabilization of medications.    Anticipated Disposition:  Home or Self Care    Total time:  15 with greater than 50% of this time spent in counseling and/or coordination of care.       Blayne Gamble MD   Psychiatry  [facility]

## 2025-01-21 ENCOUNTER — HOSPITAL ENCOUNTER (OUTPATIENT)
Dept: RADIOLOGY | Facility: CLINIC | Age: 55
Discharge: HOME | End: 2025-01-21
Payer: COMMERCIAL

## 2025-01-21 ENCOUNTER — OFFICE VISIT (OUTPATIENT)
Dept: PRIMARY CARE | Facility: CLINIC | Age: 55
End: 2025-01-21
Payer: COMMERCIAL

## 2025-01-21 ENCOUNTER — LAB (OUTPATIENT)
Dept: LAB | Facility: LAB | Age: 55
End: 2025-01-21
Payer: COMMERCIAL

## 2025-01-21 VITALS
BODY MASS INDEX: 43.36 KG/M2 | OXYGEN SATURATION: 98 % | HEART RATE: 78 BPM | SYSTOLIC BLOOD PRESSURE: 130 MMHG | RESPIRATION RATE: 16 BRPM | DIASTOLIC BLOOD PRESSURE: 80 MMHG | WEIGHT: 222 LBS

## 2025-01-21 DIAGNOSIS — M79.89 SWELLING OF RIGHT HAND: ICD-10-CM

## 2025-01-21 DIAGNOSIS — Z11.3 SCREEN FOR STD (SEXUALLY TRANSMITTED DISEASE): ICD-10-CM

## 2025-01-21 DIAGNOSIS — J40 BRONCHITIS: ICD-10-CM

## 2025-01-21 DIAGNOSIS — F41.9 ANXIETY DISORDER, UNSPECIFIED: ICD-10-CM

## 2025-01-21 DIAGNOSIS — R79.9 ABNORMAL BLOOD CHEMISTRY: ICD-10-CM

## 2025-01-21 DIAGNOSIS — E78.5 HYPERLIPIDEMIA, UNSPECIFIED HYPERLIPIDEMIA TYPE: ICD-10-CM

## 2025-01-21 DIAGNOSIS — E55.9 VITAMIN D DEFICIENCY, UNSPECIFIED: ICD-10-CM

## 2025-01-21 DIAGNOSIS — F43.21 GRIEVING: ICD-10-CM

## 2025-01-21 DIAGNOSIS — M79.89 SWELLING OF RIGHT HAND: Primary | ICD-10-CM

## 2025-01-21 DIAGNOSIS — L40.50 ARTHROPATHIC PSORIASIS, UNSPECIFIED (MULTI): ICD-10-CM

## 2025-01-21 DIAGNOSIS — F10.20 MODERATE ALCOHOL DEPENDENCE (MULTI): ICD-10-CM

## 2025-01-21 LAB
HBV SURFACE AG SERPL QL IA: NONREACTIVE
HCV AB SER QL: NONREACTIVE
TREPONEMA PALLIDUM IGG+IGM AB [PRESENCE] IN SERUM OR PLASMA BY IMMUNOASSAY: NONREACTIVE

## 2025-01-21 PROCEDURE — 73130 X-RAY EXAM OF HAND: CPT | Mod: RT

## 2025-01-21 PROCEDURE — 82043 UR ALBUMIN QUANTITATIVE: CPT | Performed by: INTERNAL MEDICINE

## 2025-01-21 PROCEDURE — 1036F TOBACCO NON-USER: CPT | Performed by: INTERNAL MEDICINE

## 2025-01-21 PROCEDURE — 73130 X-RAY EXAM OF HAND: CPT | Mod: RIGHT SIDE | Performed by: RADIOLOGY

## 2025-01-21 PROCEDURE — 99214 OFFICE O/P EST MOD 30 MIN: CPT | Performed by: INTERNAL MEDICINE

## 2025-01-21 RX ORDER — FLUTICASONE PROPIONATE 50 MCG
2 SPRAY, SUSPENSION (ML) NASAL DAILY
Qty: 16 G | Refills: 5 | Status: SHIPPED | OUTPATIENT
Start: 2025-01-21 | End: 2026-01-21

## 2025-01-21 RX ORDER — HYDROCODONE BITARTRATE AND HOMATROPINE METHYLBROMIDE ORAL SOLUTION 5; 1.5 MG/5ML; MG/5ML
5 LIQUID ORAL EVERY 6 HOURS PRN
Qty: 100 ML | Refills: 0 | Status: SHIPPED | OUTPATIENT
Start: 2025-01-21 | End: 2025-01-28

## 2025-01-21 RX ORDER — LEVOCETIRIZINE DIHYDROCHLORIDE 5 MG/1
5 TABLET, FILM COATED ORAL EVERY EVENING
Qty: 30 TABLET | Refills: 3 | Status: SHIPPED | OUTPATIENT
Start: 2025-01-21

## 2025-01-21 RX ORDER — ERGOCALCIFEROL 1.25 MG/1
1 CAPSULE ORAL
Qty: 12 CAPSULE | Refills: 0 | Status: SHIPPED | OUTPATIENT
Start: 2025-01-21

## 2025-01-21 RX ORDER — MELOXICAM 15 MG/1
15 TABLET ORAL DAILY
Qty: 30 TABLET | Refills: 0 | Status: SHIPPED | OUTPATIENT
Start: 2025-01-21 | End: 2025-02-20

## 2025-01-21 ASSESSMENT — ENCOUNTER SYMPTOMS
LOSS OF SENSATION IN FEET: 0
OCCASIONAL FEELINGS OF UNSTEADINESS: 0
DEPRESSION: 0

## 2025-01-21 ASSESSMENT — PATIENT HEALTH QUESTIONNAIRE - PHQ9
2. FEELING DOWN, DEPRESSED OR HOPELESS: NOT AT ALL
1. LITTLE INTEREST OR PLEASURE IN DOING THINGS: NOT AT ALL
SUM OF ALL RESPONSES TO PHQ9 QUESTIONS 1 AND 2: 0

## 2025-01-21 NOTE — PATIENT INSTRUCTIONS
So great you stopped smoking and drinking , and so good that you are on your way to wellness     Only take the meloxicam for five days with meals so your ulcers do not act up     Hold off on using the albuterol inhaler drink more water   Cut back on coffee if your heart rate continue to feel like it is fast

## 2025-01-21 NOTE — PROGRESS NOTES
Subjective   Patient ID: Kandi Bliss is a 54 y.o. female who presents for EPV, Hand Pain (Right hand swelling and pain.), and Earache (Both ears).    HPI  Patient in for a visit  Cough worse last night she quit smoking, son is not sick   Does not drink alcohol , beers none since MIGUEL ANGEL   Hand is swollen no injury the 2nd and 34d fingers keep locking up   Not taking anything for it just tyl arthritis,  she cannot take nsaid because of the hx of ulcers GI told her not to take ibuprofen again which caused   Her friend is visiting her from [place] to help her get better and has a book Cleanse to heal     Review of Systems  General: see hpi  Ears, Nose, Throat : see hpi  Dermatologic: Negative for new skin conditions, rash  Respiratory: see hpi  Cardiovascular: Negative for chest pain, palpitations, or leg swelling  Gastrointestinal: Negative for nausea/vomiting, abdominal pain, changes in bowel habits  Neurological: Negative for dizziness see hpi headaches      Previous history  Past Medical History:   Diagnosis Date    Abnormal Heart Score CT 07/17/2023    Total  24.31    Anxiety     Cardiology follow-up encounter 04/21/2023    Quinn NEWELL MD    GERD (gastroesophageal reflux disease)     Glaucoma suspect of both eyes     Hyperlipidemia     Insomnia     Internal derangement of knee, unspecified laterality     Loose body in knee, right knee     Plan: Right Knee Arthroscopy; Meniscectomy 11/14/24    Moderate alcohol dependence (Multi)     Obesity     Psoriatic arthritis (Multi)     Sleep apnea     Tobacco abuse     Vertigo     Vitamin D insufficiency      Past Surgical History:   Procedure Laterality Date    COLONOSCOPY  06/04/2021    HYSTERECTOMY      LAPAROTOMY SALPINGO OOPHORECTOMY      MR HEAD ANGIO WO IV CONTRAST  11/27/2018    MR HEAD ANGIO WO IV CONTRAST 11/27/2018 [facility]    MR NECK ANGIO WO IV CONTRAST  11/27/2018    MR NECK ANGIO WO IV CONTRAST 11/27/2018 [facility]    TUBAL  LIGATION       Social History     Tobacco Use    Smoking status: Former     Current packs/day: 0.00     Types: Cigarettes     Quit date: 2024     Years since quittin.0    Smokeless tobacco: Never    Tobacco comments:     Discussed all alternatives for treatment.  Medication versus counseling versus cold turkey.  Will start on nicotine patch 5 minutes   Vaping Use    Vaping status: Never Used   Substance Use Topics    Alcohol use: Not Currently     Alcohol/week: 6.0 standard drinks of alcohol     Types: 6 Cans of beer per week    Drug use: Never     Family History   Problem Relation Name Age of Onset    Breast cancer Mother      Atrial fibrillation Mother      Glaucoma Mother      Diabetes Mother      Stroke Father      Other (tonsillar cancer) Sister Winston     Parkinsonism Maternal Grandmother       Allergies   Allergen Reactions    Avelox [Moxifloxacin] Diarrhea    Bacitracin Rash    Benzonatate Unknown     PT'S DOESN'T REMEMBER    Latex Itching and Rash    Oxycodone Itching    Penicillins Hives     Current Outpatient Medications   Medication Instructions    ALPRAZolam (XANAX) 0.5 mg, oral, 2 times daily, Prn anxiety    atorvastatin (LIPITOR) 40 mg, oral, Daily    buPROPion (WELLBUTRIN) 100 mg, oral, 2 times daily    clobetasol (Temovate) 0.05 % ointment Topical, 2 times daily PRN, Apply to affected areas on arms/feet as needed    dupilumab (Dupixent Syringe) 300 mg/2 mL prefilled syringe INJECT 300 MG (1 SYRINGE) UNDER THE SKIN EVERY OTHER WEEK, AS DIRECTED    ergocalciferol (VITAMIN D-2) 1,250 mcg, oral, Once Weekly    famotidine (PEPCID) 40 mg, oral, 2 times daily    fluticasone (Flonase) 50 mcg/actuation nasal spray 2 sprays, Each Nostril, Daily, Shake gently. Before first use, prime pump. After use, clean tip and replace cap.    nicotine (Nicoderm CQ) 14 mg/24 hr patch 1 patch, transdermal, Every 24 hours    tacrolimus (Protopic) 0.1 % ointment Topical, 2 times daily PRN, Apply to affected areas of  armpits as needed    Zepbound 2.5 mg, subcutaneous, Every 7 days    zolpidem (AMBIEN) 10 mg, oral, Nightly PRN       Objective       Physical Exam  Vital Signs: as recorded above  General: Well groomed, well nourished   Orientation:  Alert , oriented to time, place , and person   Mood and Affect:  Cooperative , no apparent distress normal affect  Skin: Good color, good turgor  Eyes: Extra ocular muscle movements intact, anicteric sclerae  Neck: Supple, full range of movement  Chest: Normal breath sounds, normal chest wall exam, symmetric, good air entry, clear to auscultation  Heart: Regular rate and rhythm, without murmur, gallop, or rubs  BACK:  no CTLS spine tenderness, no flank tenderness  Extremities: full range of movement  bilateral UE and bilateral LE,  no lower extremity edema  Neurological: Alert, oriented, cranial nerves II-XII grossly intact except for visual acuity  Sensation:  Intact   Gait: normal steady      Assessment/Plan   Kandi Bliss is a 54 y.o. female who presents for the concerns below:    Problem List Items Addressed This Visit       Arthropathic psoriasis, unspecified (Multi)    Moderate alcohol dependence (Multi)     Other Visit Diagnoses       Bronchitis              She has not been drinking as much just beer at [place]     COUGH PLAN Rest, sleep is important.   Hydration important at least 6-8 glasses of water  Take analgesics Tylenol   Frequent hand washing  Sanitize surroundings  Guaifenesin if tolerated  Change toothbrush once recovered.   Antimicrobial therapy as appropriate - Amoxicillin if not allergic, Azithromycin or Doxycycline  if with allergies Live culture yogurt or probiotics to help regrow good bacteria  Will also add benzonatate perles and have patient over the counter guaifenesin DM . Call if shortness of breath, blood and sputum, change in character of cough, development of fever or chills.   If with inability to maintain nutrition and hydration seek emergent care.   Avoid exposure to tobacco smoke and fumes. cautioned that  antibiotic may cause c diff colitis  ,    Psoriasis still on dupixent and using tacrolimus ,   Foot under the toenail only on the left foot     SLEEP DISTURBANCE PLAN:  goal is 7-9 hours of sleep contributes to optimal physical and brain health.    Sleep hygiene zolpidem will send it for next month      HAND swelling Pain and limited range of motion will have her take mobic only for five days   Restarted her dupixent in December     Discussed with:   Return in :     Portions of this note were generated using digital voice recognition software, and may contain grammatical errors       Kaleb Barnett MD  01/21/25  11:52 AM

## 2025-01-22 ENCOUNTER — PHARMACY VISIT (OUTPATIENT)
Dept: PHARMACY | Facility: CLINIC | Age: 55
End: 2025-01-22
Payer: COMMERCIAL

## 2025-01-22 ENCOUNTER — SPECIALTY PHARMACY (OUTPATIENT)
Dept: PHARMACY | Facility: CLINIC | Age: 55
End: 2025-01-22

## 2025-01-22 LAB
C TRACH RRNA SPEC QL NAA+PROBE: NEGATIVE
CREAT UR-MCNC: 121.6 MG/DL (ref 20–320)
HIV 1+2 AB+HIV1 P24 AG SERPL QL IA: NONREACTIVE
MICROALBUMIN UR-MCNC: 7.6 MG/L
MICROALBUMIN/CREAT UR: 6.3 UG/MG CREAT
N GONORRHOEA DNA SPEC QL PROBE+SIG AMP: NEGATIVE
T VAGINALIS RRNA SPEC QL NAA+PROBE: NEGATIVE

## 2025-01-22 RX ORDER — ALPRAZOLAM 0.5 MG/1
0.5 TABLET ORAL 2 TIMES DAILY PRN
Qty: 60 TABLET | Refills: 0 | Status: SHIPPED | OUTPATIENT
Start: 2025-01-22

## 2025-01-23 ENCOUNTER — LAB (OUTPATIENT)
Dept: LAB | Facility: LAB | Age: 55
End: 2025-01-23
Payer: COMMERCIAL

## 2025-01-23 DIAGNOSIS — B35.1 ONYCHOMYCOSIS: Primary | ICD-10-CM

## 2025-01-23 RX ORDER — TERBINAFINE HYDROCHLORIDE 250 MG/1
250 TABLET ORAL DAILY
Qty: 90 TABLET | Refills: 0 | Status: SHIPPED | OUTPATIENT
Start: 2025-01-23 | End: 2025-04-23

## 2025-01-23 NOTE — PROGRESS NOTES
Spoke with patient, labs reviewed.  Rx:lamisil x 90 days.  Repeat LFTS at 6 weeks.  Follow up 3 months

## 2025-01-27 ENCOUNTER — TELEPHONE (OUTPATIENT)
Dept: PRIMARY CARE | Facility: CLINIC | Age: 55
End: 2025-01-27
Payer: COMMERCIAL

## 2025-01-27 DIAGNOSIS — K64.9 HEMORRHOIDS, UNSPECIFIED HEMORRHOID TYPE: Primary | ICD-10-CM

## 2025-01-27 DIAGNOSIS — E78.5 HYPERLIPIDEMIA, UNSPECIFIED HYPERLIPIDEMIA TYPE: Primary | ICD-10-CM

## 2025-01-27 RX ORDER — HYDROCORTISONE 25 MG/G
CREAM TOPICAL 4 TIMES DAILY PRN
Qty: 30 G | Refills: 0 | Status: SHIPPED | OUTPATIENT
Start: 2025-01-27 | End: 2026-01-27

## 2025-01-27 NOTE — TELEPHONE ENCOUNTER
Patient called and she had an issue with her hemorrhoids this weekend.  Tried OTC medications   Some bleeding and painful, hard to sit.    She wanted to know if there was a prescription medication that you could prescribe that would help?

## 2025-01-27 NOTE — TELEPHONE ENCOUNTER
Patient called back she stated the current lab order still does not have the lipid panel and A1C on it. Please add items to the order.  She will go back to the lab tomorrow morning.

## 2025-01-27 NOTE — TELEPHONE ENCOUNTER
Phone call from the lab  [phone]    CMP was cancelled because the holding time was to long    Needs to be redrawn

## 2025-01-27 NOTE — TELEPHONE ENCOUNTER
Patient stated the lab messed up her order. She needs a new order sent to the lab as soon as possible. She is waiting for a call back  when the order is placed. So she can go to the lab this morning while she is fasting.

## 2025-01-29 DIAGNOSIS — E78.5 HYPERLIPIDEMIA, UNSPECIFIED HYPERLIPIDEMIA TYPE: ICD-10-CM

## 2025-01-29 DIAGNOSIS — R79.9 ABNORMAL BLOOD CHEMISTRY: Primary | ICD-10-CM

## 2025-01-30 DIAGNOSIS — E78.00 PURE HYPERCHOLESTEROLEMIA, UNSPECIFIED: ICD-10-CM

## 2025-01-30 LAB
ALBUMIN SERPL-MCNC: 4.3 G/DL (ref 3.6–5.1)
ALP SERPL-CCNC: 85 U/L (ref 37–153)
ALT SERPL-CCNC: 22 U/L (ref 6–29)
ANION GAP SERPL CALCULATED.4IONS-SCNC: 12 MMOL/L (CALC) (ref 7–17)
AST SERPL-CCNC: 15 U/L (ref 10–35)
BILIRUB SERPL-MCNC: 0.5 MG/DL (ref 0.2–1.2)
BUN SERPL-MCNC: 13 MG/DL (ref 7–25)
CALCIUM SERPL-MCNC: 9.4 MG/DL (ref 8.6–10.4)
CHLORIDE SERPL-SCNC: 102 MMOL/L (ref 98–110)
CHOLEST SERPL-MCNC: 187 MG/DL
CHOLEST/HDLC SERPL: 5.7 (CALC)
CO2 SERPL-SCNC: 24 MMOL/L (ref 20–32)
CREAT SERPL-MCNC: 0.57 MG/DL (ref 0.5–1.03)
EGFRCR SERPLBLD CKD-EPI 2021: 108 ML/MIN/1.73M2
EST. AVERAGE GLUCOSE BLD GHB EST-MCNC: 126 MG/DL
EST. AVERAGE GLUCOSE BLD GHB EST-SCNC: 7 MMOL/L
GLUCOSE SERPL-MCNC: 89 MG/DL (ref 65–99)
HBA1C MFR BLD: 6 % OF TOTAL HGB
HDLC SERPL-MCNC: 33 MG/DL
LDLC SERPL CALC-MCNC: 126 MG/DL (CALC)
NONHDLC SERPL-MCNC: 154 MG/DL (CALC)
POTASSIUM SERPL-SCNC: 4.8 MMOL/L (ref 3.5–5.3)
PROT SERPL-MCNC: 7 G/DL (ref 6.1–8.1)
SODIUM SERPL-SCNC: 138 MMOL/L (ref 135–146)
TRIGL SERPL-MCNC: 162 MG/DL

## 2025-01-30 RX ORDER — ROSUVASTATIN CALCIUM 20 MG/1
20 TABLET, COATED ORAL DAILY
Qty: 30 TABLET | Refills: 3 | Status: SHIPPED | OUTPATIENT
Start: 2025-01-30 | End: 2025-05-30

## 2025-01-30 NOTE — TELEPHONE ENCOUNTER
Per AQ I faxed Pt's lab orders to [facility] lab location.  Pt heard they were asking for the actual paper orders.    Pt called.  She is worried about her lab results.  Pt asking for AQ advice/lab result summary.

## 2025-02-03 DIAGNOSIS — R41.840 ATTENTION DEFICIT: Primary | ICD-10-CM

## 2025-02-03 RX ORDER — ATOMOXETINE 18 MG/1
18 CAPSULE ORAL DAILY
Qty: 30 CAPSULE | Refills: 0 | Status: SHIPPED | OUTPATIENT
Start: 2025-02-03 | End: 2025-03-05

## 2025-02-04 ENCOUNTER — APPOINTMENT (OUTPATIENT)
Dept: ORTHOPEDIC SURGERY | Facility: CLINIC | Age: 55
End: 2025-02-04
Payer: COMMERCIAL

## 2025-02-04 DIAGNOSIS — E66.01 CLASS 3 SEVERE OBESITY WITH BODY MASS INDEX (BMI) OF 40.0 TO 44.9 IN ADULT, UNSPECIFIED OBESITY TYPE, UNSPECIFIED WHETHER SERIOUS COMORBIDITY PRESENT: ICD-10-CM

## 2025-02-04 DIAGNOSIS — E66.813 CLASS 3 SEVERE OBESITY WITH BODY MASS INDEX (BMI) OF 40.0 TO 44.9 IN ADULT, UNSPECIFIED OBESITY TYPE, UNSPECIFIED WHETHER SERIOUS COMORBIDITY PRESENT: ICD-10-CM

## 2025-02-04 DIAGNOSIS — R73.03 PREDIABETES: ICD-10-CM

## 2025-02-05 ENCOUNTER — APPOINTMENT (OUTPATIENT)
Dept: PRIMARY CARE | Facility: CLINIC | Age: 55
End: 2025-02-05
Payer: COMMERCIAL

## 2025-02-05 ENCOUNTER — OFFICE VISIT (OUTPATIENT)
Dept: ENDOCRINOLOGY | Facility: CLINIC | Age: 55
End: 2025-02-05
Payer: COMMERCIAL

## 2025-02-05 VITALS
SYSTOLIC BLOOD PRESSURE: 128 MMHG | HEIGHT: 60 IN | DIASTOLIC BLOOD PRESSURE: 76 MMHG | BODY MASS INDEX: 43.98 KG/M2 | WEIGHT: 224 LBS

## 2025-02-05 DIAGNOSIS — R73.03 PREDIABETES: Primary | ICD-10-CM

## 2025-02-05 DIAGNOSIS — E78.5 HYPERLIPIDEMIA, UNSPECIFIED HYPERLIPIDEMIA TYPE: ICD-10-CM

## 2025-02-05 DIAGNOSIS — M17.12 PRIMARY OSTEOARTHRITIS OF LEFT KNEE: ICD-10-CM

## 2025-02-05 DIAGNOSIS — G47.33 OSA (OBSTRUCTIVE SLEEP APNEA): ICD-10-CM

## 2025-02-05 PROCEDURE — 99244 OFF/OP CNSLTJ NEW/EST MOD 40: CPT | Performed by: INTERNAL MEDICINE

## 2025-02-05 PROCEDURE — 3008F BODY MASS INDEX DOCD: CPT | Performed by: INTERNAL MEDICINE

## 2025-02-05 RX ORDER — POTASSIUM CHLORIDE 750 MG/1
10 TABLET, FILM COATED, EXTENDED RELEASE ORAL 2 TIMES DAILY
Qty: 60 TABLET | Refills: 5 | Status: SHIPPED | OUTPATIENT
Start: 2025-02-05 | End: 2026-02-05

## 2025-02-05 NOTE — PROGRESS NOTES
Patient ID: Kandi Bliss is a 54 y.o. female who presents for New Patient Visit (Endocrine consult. Referred by  for PSMF.).  HPI  The patient is referred for evaluation for PSMF.    This is a 54-year-old female whose had a problem with weight her whole life.    She has tried numerous approaches with limited but no long-term success.    Weight is complicated by prediabetes fatty liver hyperlipidemia sleep apnea not on CPAP DJD in her knees edema no hypertension or GERD.    She avoids skipping meals but does a lot of snacking between meals eating and late-night eating.    She is not currently exercising.    She has a past history of cholecystectomy hysterectomy fatty liver she has never had gout or kidney stones.    Socially she is  works at  and neurology quit smoking New Year's Saundra limits alcohol.        ROS  Comprehensive review of systems is negative.    Objective   Physical Exam  Visit Vitals  /76      Vitals:    02/05/25 0957   Weight: 102 kg (224 lb)      Body mass index is 43.75 kg/m².      Family history positive diabetes in her mother.  Alert and oriented x3  In no distress  No focal neurologic deficits  No supraclavicular, or dorsal fat  No purple striae  Integument intact  Eyes normal  ENT normal. No adenopathy  Thyroid palpable and normal. No nodules  Chest clear to auscultation  Heart sounds are normal  Abdomen nontender. Bowel sounds normal. No organomegaly  Feet are okay  Reflexes normal with normal return    Current Outpatient Medications   Medication Sig Dispense Refill    ALPRAZolam (Xanax) 0.5 mg tablet Take 1 tablet (0.5 mg) by mouth 2 times a day as needed for anxiety. To fill 2/5/2025 60 tablet 0    atomoxetine (Strattera) 18 mg capsule Take 1 capsule (18 mg) by mouth once daily. Swallow capsule whole; do not open. If opened accidentally, do not touch eyes; wash hands immediately (product is an eye irritant). 30 capsule 0    atorvastatin (Lipitor) 40 mg tablet  Take 1 tablet daily 90 tablet 1    clobetasol (Temovate) 0.05 % ointment Apply topically 2 times a day as needed (skin inflammation). Apply to affected areas on arms/feet as needed 45 g 3    dupilumab (Dupixent Syringe) 300 mg/2 mL prefilled syringe INJECT 300 MG (1 SYRINGE) UNDER THE SKIN EVERY OTHER WEEK, AS DIRECTED 4 mL 7    ergocalciferol (Vitamin D-2) 1.25 MG (14218 UT) capsule Take 1 capsule (1,250 mcg) by mouth 1 (one) time per week. 12 capsule 0    fluticasone (Flonase) 50 mcg/actuation nasal spray Administer 2 sprays into each nostril once daily. Shake gently. Before first use, prime pump. After use, clean tip and replace cap. 16 g 5    hydrocortisone (Anusol-HC) 2.5 % rectal cream Insert into the rectum 4 times a day as needed for hemorrhoids (rectal discomfort). Apply to affected areas 30 g 0    levocetirizine (Xyzal) 5 mg tablet Take 1 tablet (5 mg) by mouth once daily in the evening. 30 tablet 3    meloxicam (Mobic) 15 mg tablet Take 1 tablet (15 mg) by mouth once daily. 30 tablet 0    tacrolimus (Protopic) 0.1 % ointment Apply topically 2 times a day as needed (skin inflammation). Apply to affected areas of armpits as needed 60 g 3    terbinafine (LamISIL) 250 mg tablet Take 1 tablet (250 mg) by mouth once daily. 90 tablet 0    zolpidem (Ambien) 10 mg tablet Take 1 tablet (10 mg) by mouth as needed at bedtime for sleep. 30 tablet 1    famotidine (Pepcid) 40 mg tablet Take 1 tablet (40 mg) by mouth 2 times a day. (Patient not taking: Reported on 2/5/2025) 30 tablet 1    nicotine (Nicoderm CQ) 14 mg/24 hr patch Place 1 patch over 24 hours on the skin once every 24 hours. 30 patch 0    rosuvastatin (Crestor) 20 mg tablet Take 1 tablet (20 mg) by mouth once daily. (Patient not taking: Reported on 2/5/2025) 30 tablet 3    tirzepatide, weight loss, (Zepbound) 2.5 mg/0.5 mL solution Inject 2.5 mg under the skin every 7 days. (Patient not taking: Reported on 2/5/2025) 2 mL 0     No current  facility-administered medications for this visit.       Assessment/Plan     1.  Prediabetes  2.  Insulin resistance  3.  Hyperlipidemia  4.  Sleep apnea  5.  Fatty liver    We discussed insulin resistance this pathophysiology and its impact.    We discussed risks, benefits and alternatives to the protein sparing modified fast.    We discussed risks, including, but not limited to the potential for electrolyte imbalance which could lead to cardiac arrhythmia.  The high protein nature of the diet increasing levels of uric acid which could lead to nephrolithiasis or gout.  The low fiber nature of the diet increasing risk for constipation.  Risk for gallstones, cold intolerance, excess skin seen with significant weight loss.      We discussed the benefits of weight loss regarding co-morbid conditions.      We also discussed alternatives to the program, including a balanced calorie restricted approach coupled with exercise and she would like to proceed.  We'll therefore set her up with the nutritionist for dietary instruction. When she starts the program, we'll add in potassium, as well as the other supplements and keep a close eye on electrolytes.    She'll follow up with me in 2 months, sooner as needed.

## 2025-02-11 ENCOUNTER — APPOINTMENT (OUTPATIENT)
Dept: PHARMACY | Facility: HOSPITAL | Age: 55
End: 2025-02-11
Payer: COMMERCIAL

## 2025-02-13 ENCOUNTER — PATIENT OUTREACH (OUTPATIENT)
Dept: CARE COORDINATION | Facility: CLINIC | Age: 55
End: 2025-02-13
Payer: COMMERCIAL

## 2025-02-14 ENCOUNTER — TELEMEDICINE (OUTPATIENT)
Dept: PHARMACY | Facility: HOSPITAL | Age: 55
End: 2025-02-14
Payer: COMMERCIAL

## 2025-02-14 DIAGNOSIS — E66.813 CLASS 3 SEVERE OBESITY WITH BODY MASS INDEX (BMI) OF 40.0 TO 44.9 IN ADULT, UNSPECIFIED OBESITY TYPE, UNSPECIFIED WHETHER SERIOUS COMORBIDITY PRESENT: ICD-10-CM

## 2025-02-14 DIAGNOSIS — E66.01 CLASS 3 SEVERE OBESITY WITH BODY MASS INDEX (BMI) OF 40.0 TO 44.9 IN ADULT, UNSPECIFIED OBESITY TYPE, UNSPECIFIED WHETHER SERIOUS COMORBIDITY PRESENT: ICD-10-CM

## 2025-02-14 NOTE — PROGRESS NOTES
Pharmacist Clinic: Weight Management    Kandi Bliss was referred to the Clinical Pharmacy Team for weight management.     Referring Provider: Kaleb Barnett MD  Problem List Items Addressed This Visit    None  Visit Diagnoses       Class 3 severe obesity with body mass index (BMI) of 40.0 to 44.9 in adult, unspecified obesity type, unspecified whether serious comorbidity present              HISTORY OF PRESENT ILLNESS    Diet: trying to avoid fried food    Exercise Routine: treadmill at home, has knee pain and has appt with rheumatology and procedure in January     Additional Contributory Factors: stress- family health concerns, job stress     Weight  224 lb (initial)    PHARMACY ASSESSMENT  Pertinent PMH Review:  - PMH of Pancreatitis: No  - PMH of Retinopathy: No  - PMH of MTC: No    Allergies: Avelox [moxifloxacin], Bacitracin, Benzonatate, Latex, Oxycodone, and Penicillins     [id number] - [place], [place] - [address]  [address] [address]  Phone: [phone] Fax: [phone]     Specialty Pharmacy  [address] [place] [address]  Phone: [phone] Fax: [phone]    [facility] [address]   [address] [facility] [address]  Phone: [phone] Fax: [phone]    Affordability/Accessibility: weight loss plan exclusion, Ozempic PA denied, patient moving forward with Zepbound vial cash pay direct from   Adherence/Organization: None  Adverse Effects: None    CURRENT PHARMACOTHERAPY  - none     DRUG INTERACTIONS  - No significant drug-drug interactions exist that require adjustment to therapy    LAB  Lab Results   Component Value Date    BILITOT 0.5 01/29/2025    CALCIUM 9.4 01/29/2025    CO2 24 01/29/2025     01/29/2025    CREATININE 0.57 01/29/2025    GLUCOSE 89 01/29/2025    ALKPHOS 85 01/29/2025    K 4.8 01/29/2025    PROT 7.0 01/29/2025    NA  138 01/29/2025    AST 15 01/29/2025    ALT 22 01/29/2025    BUN 13 01/29/2025    ANIONGAP 12 01/29/2025    ALBUMIN 4.3 01/29/2025    LIPASE 31 07/17/2019    EGFR 108 01/29/2025     Lab Results   Component Value Date    TRIG 162 (H) 01/29/2025    CHOL 187 01/29/2025    LDLCALC 126 (H) 01/29/2025    HDL 33 (L) 01/29/2025     Lab Results   Component Value Date    HGBA1C 6.0 (H) 01/29/2025       Current Outpatient Medications on File Prior to Visit   Medication Sig Dispense Refill    ALPRAZolam (Xanax) 0.5 mg tablet Take 1 tablet (0.5 mg) by mouth 2 times a day as needed for anxiety. To fill 2/5/2025 60 tablet 0    atomoxetine (Strattera) 18 mg capsule Take 1 capsule (18 mg) by mouth once daily. Swallow capsule whole; do not open. If opened accidentally, do not touch eyes; wash hands immediately (product is an eye irritant). 30 capsule 0    atorvastatin (Lipitor) 40 mg tablet Take 1 tablet daily 90 tablet 1    clobetasol (Temovate) 0.05 % ointment Apply topically 2 times a day as needed (skin inflammation). Apply to affected areas on arms/feet as needed 45 g 3    dupilumab (Dupixent Syringe) 300 mg/2 mL prefilled syringe INJECT 300 MG (1 SYRINGE) UNDER THE SKIN EVERY OTHER WEEK, AS DIRECTED 4 mL 7    ergocalciferol (Vitamin D-2) 1.25 MG (82599 UT) capsule Take 1 capsule (1,250 mcg) by mouth 1 (one) time per week. 12 capsule 0    famotidine (Pepcid) 40 mg tablet Take 1 tablet (40 mg) by mouth 2 times a day. (Patient not taking: Reported on 2/5/2025) 30 tablet 1    fluticasone (Flonase) 50 mcg/actuation nasal spray Administer 2 sprays into each nostril once daily. Shake gently. Before first use, prime pump. After use, clean tip and replace cap. 16 g 5    hydrocortisone (Anusol-HC) 2.5 % rectal cream Insert into the rectum 4 times a day as needed for hemorrhoids (rectal discomfort). Apply to affected areas 30 g 0    levocetirizine (Xyzal) 5 mg tablet Take 1 tablet (5 mg) by mouth once daily in the evening. 30 tablet 3     meloxicam (Mobic) 15 mg tablet Take 1 tablet (15 mg) by mouth once daily. 30 tablet 0    nicotine (Nicoderm CQ) 14 mg/24 hr patch Place 1 patch over 24 hours on the skin once every 24 hours. 30 patch 0    potassium chloride CR 10 mEq ER tablet Take 1 tablet (10 mEq) by mouth 2 times a day. Do not crush, chew, or split. 60 tablet 5    rosuvastatin (Crestor) 20 mg tablet Take 1 tablet (20 mg) by mouth once daily. (Patient not taking: Reported on 2/5/2025) 30 tablet 3    tacrolimus (Protopic) 0.1 % ointment Apply topically 2 times a day as needed (skin inflammation). Apply to affected areas of armpits as needed 60 g 3    terbinafine (LamISIL) 250 mg tablet Take 1 tablet (250 mg) by mouth once daily. 90 tablet 0    tirzepatide, weight loss, (Zepbound) 2.5 mg/0.5 mL solution Inject 2.5 mg under the skin every 7 days. (Patient not taking: Reported on 2/5/2025) 2 mL 0    zolpidem (Ambien) 10 mg tablet Take 1 tablet (10 mg) by mouth as needed at bedtime for sleep. 30 tablet 1     No current facility-administered medications on file prior to visit.       PATIENT EDUCATION/GOALS  - Counseled patient on MOA, expectations, side effects, duration of therapy, contraindications, administration, and monitoring parameters  - Answered all patient questions and concerns; provided [facility] phone number if issues/questions arise    RECOMMENDATIONS/PLAN  Patient reports she is not able to afford to pay for Zepbound vials like previously discussed and prescribed  She will reach out to pharmacy team to discuss in the future if this changes for her  Encouraged lifestyle modifications     Clinical Pharmacist follow up: not scheduled, patient provided pharmacist contact information   Type of Encounter: virtual    Continue all meds under the continuation of care with the referring provider and clinical pharmacy team.    Christin Stafford, PharmD  Clinical Pharmacy Specialist, Primary Care   [phone]    Verbal consent to manage patient's drug therapy  was obtained from patient. They were informed they may decline to participate or withdraw from participation in pharmacy services at any time.

## 2025-02-19 DIAGNOSIS — L30.1 ECZEMA, DYSHIDROTIC: ICD-10-CM

## 2025-02-20 ENCOUNTER — SPECIALTY PHARMACY (OUTPATIENT)
Dept: PHARMACY | Facility: CLINIC | Age: 55
End: 2025-02-20

## 2025-02-20 RX ORDER — CLOBETASOL PROPIONATE 0.5 MG/G
OINTMENT TOPICAL 2 TIMES DAILY PRN
Qty: 45 G | Refills: 3 | Status: SHIPPED | OUTPATIENT
Start: 2025-02-20

## 2025-02-20 RX ORDER — TACROLIMUS 1 MG/G
OINTMENT TOPICAL 2 TIMES DAILY PRN
Qty: 60 G | Refills: 3 | Status: SHIPPED | OUTPATIENT
Start: 2025-02-20 | End: 2026-02-20

## 2025-02-21 ENCOUNTER — SPECIALTY PHARMACY (OUTPATIENT)
Dept: PHARMACY | Facility: CLINIC | Age: 55
End: 2025-02-21

## 2025-02-25 ENCOUNTER — APPOINTMENT (OUTPATIENT)
Dept: DERMATOLOGY | Facility: CLINIC | Age: 55
End: 2025-02-25
Payer: COMMERCIAL

## 2025-02-25 DIAGNOSIS — L30.1 ECZEMA, DYSHIDROTIC: Primary | ICD-10-CM

## 2025-02-25 DIAGNOSIS — Z79.899 OTHER LONG TERM (CURRENT) DRUG THERAPY: ICD-10-CM

## 2025-02-25 PROCEDURE — 99213 OFFICE O/P EST LOW 20 MIN: CPT | Performed by: DERMATOLOGY

## 2025-02-25 NOTE — PROGRESS NOTES
Subjective     Kandi Bliss is a 54 y.o. female who presents for the following: Eczema (Hands improved since restarting Dupixent in December. She has a new rash involving medial thighs. She has the same rash in the right armpit and has not been using tacrolimus ointment regularly. She also saw a foot doctor a rash on the feet that was diagnosed as fungus.).     Review of Systems:  No other skin or systemic complaints other than what is documented elsewhere in the note.    The following portions of the chart were reviewed this encounter and updated as appropriate:          Skin Cancer History  No skin cancer on file.      Specialty Problems    None       Objective   Well appearing patient in no apparent distress; mood and affect are within normal limits.    A focused skin examination was performed. All findings within normal limits unless otherwise noted below.    Assessment/Plan   1. Eczema, dyshidrotic  Hyperpigmented and slightly erythematous patch of right axilla    Severe Dyshydrotic Eczema, improved since restarted Dupixent  - Patient had been on Dupixent for many years but discontinued last year due to running out of medication and joint pains and eczema flared. Evaluated by rheumatology and approval given to restart Dupixent  - Patient has had significant improvement of her hands since restarting Dupixent in early December, continue [id number]  - Continues to note a rash in the right axilla and now involving the inner thighs  - Recommend tacrolimus 0.1% ointment BID prn to axilla and thighs. For flares on the body, patient can use clobetasol 0.05% ointment BID prn  - RTC in person May or June per patient request    Related Medications  tacrolimus (Protopic) 0.1 % ointment  Apply topically 2 times a day as needed (skin inflammation). Apply to affected areas of armpits as needed    clobetasol (Temovate) 0.05 % ointment  Apply topically 2 times a day as needed (skin inflammation). Apply to affected areas on  arms/feet as needed    2. Other long term (current) drug therapy    Rosalie Sanford MD  PGY3, Dermatology    I saw and evaluated the patient. I personally obtained the key and critical portions of the history and physical exam or was physically present for key and critical portions performed by the resident/fellow. I reviewed the resident/fellow's documentation and discussed the patient with the resident/fellow. I agree with the resident/fellow's medical decision making as documented in the note.    Grant Tuttle MD PhD

## 2025-02-27 ENCOUNTER — APPOINTMENT (OUTPATIENT)
Dept: CARE COORDINATION | Facility: CLINIC | Age: 55
End: 2025-02-27
Payer: COMMERCIAL

## 2025-02-27 NOTE — PROGRESS NOTES
INITIAL NUTRITION EDUCATION VISIT    Reason for Nutrition Visit:  Pt is a 54 y.o. female being seen Virtual, as phone only referred for  prediabetes, weight management    Past Medical Hx:  Patient Active Problem List   Diagnosis    Primary osteoarthritis of left knee    Loose body in knee, right knee    Arthropathic psoriasis, unspecified (Multi)    Moderate alcohol dependence (Multi)    Internal derangement of knee    Prediabetes    Hyperlipidemia        Current Medications:   Current Outpatient Medications on File Prior to Visit   Medication Sig Dispense Refill    ALPRAZolam (Xanax) 0.5 mg tablet Take 1 tablet (0.5 mg) by mouth 2 times a day as needed for anxiety. To fill 2/5/2025 60 tablet 0    atomoxetine (Strattera) 18 mg capsule Take 1 capsule (18 mg) by mouth once daily. Swallow capsule whole; do not open. If opened accidentally, do not touch eyes; wash hands immediately (product is an eye irritant). 30 capsule 0    atorvastatin (Lipitor) 40 mg tablet Take 1 tablet daily 90 tablet 1    clobetasol (Temovate) 0.05 % ointment Apply topically 2 times a day as needed (skin inflammation). Apply to affected areas on arms/feet as needed 45 g 3    dupilumab (Dupixent Syringe) 300 mg/2 mL prefilled syringe INJECT 300 MG (1 SYRINGE) UNDER THE SKIN EVERY OTHER WEEK, AS DIRECTED 4 mL 7    ergocalciferol (Vitamin D-2) 1.25 MG (40677 UT) capsule Take 1 capsule (1,250 mcg) by mouth 1 (one) time per week. 12 capsule 0    famotidine (Pepcid) 40 mg tablet Take 1 tablet (40 mg) by mouth 2 times a day. (Patient not taking: Reported on 2/5/2025) 30 tablet 1    fluticasone (Flonase) 50 mcg/actuation nasal spray Administer 2 sprays into each nostril once daily. Shake gently. Before first use, prime pump. After use, clean tip and replace cap. 16 g 5    hydrocortisone (Anusol-HC) 2.5 % rectal cream Insert into the rectum 4 times a day as needed for hemorrhoids (rectal discomfort). Apply to affected areas 30 g 0    levocetirizine  (Xyzal) 5 mg tablet Take 1 tablet (5 mg) by mouth once daily in the evening. 30 tablet 3    [] meloxicam (Mobic) 15 mg tablet Take 1 tablet (15 mg) by mouth once daily. 30 tablet 0    nicotine (Nicoderm CQ) 14 mg/24 hr patch Place 1 patch over 24 hours on the skin once every 24 hours. 30 patch 0    potassium chloride CR 10 mEq ER tablet Take 1 tablet (10 mEq) by mouth 2 times a day. Do not crush, chew, or split. 60 tablet 5    rosuvastatin (Crestor) 20 mg tablet Take 1 tablet (20 mg) by mouth once daily. (Patient not taking: Reported on 2025) 30 tablet 3    tacrolimus (Protopic) 0.1 % ointment Apply topically 2 times a day as needed (skin inflammation). Apply to affected areas of armpits as needed 60 g 3    terbinafine (LamISIL) 250 mg tablet Take 1 tablet (250 mg) by mouth once daily. 90 tablet 0    tirzepatide, weight loss, (Zepbound) 2.5 mg/0.5 mL solution Inject 2.5 mg under the skin every 7 days. (Patient not taking: Reported on 2025) 2 mL 0    zolpidem (Ambien) 10 mg tablet Take 1 tablet (10 mg) by mouth as needed at bedtime for sleep. 30 tablet 1     No current facility-administered medications on file prior to visit.       Food & Nutrition Related History:  Dietary Considerations:  cut down fried food uses air fryer, label reads  Food Allergies: None potentially squash  Intolerance: None  Appetite: Good eating too much - eats all day even though not hungry.     24 Diet Recall:  Meal 1: turkey sausage egg toast sandwich + snacks  Meal 2: cheese, peanuts, apples  Meal 3: [place] sprouts, chicken or turkey or pork steak in air fryer    Snacks:  pistachios, organic granola, crackers, peanuts; night time munchies since stopped smoking; ice cream, eats all day  Beverages: water  Eating out:    Alcohol Intake:  tries to avoid   Self-Identified Challenges: trying to lose weight all her life and has tried many diets etc and nothing works. She really wants medication to help such as Zepbound or Wegovy  and insurance does not cover it.    Physical Activity:   Do you exercise regularly?: Yes. 20  minutes per day just started doing this a few days ago  Types of Activities: Walking      Labs:  Lab Results   Component Value Date    HGBA1C 6.0 (H) 01/29/2025    HGBA1C 5.7 (H) 09/20/2024    HGBA1C 5.7 (H) 02/09/2024     01/29/2025    K 4.8 01/29/2025     01/29/2025    CO2 24 01/29/2025    BUN 13 01/29/2025    CREATININE 0.57 01/29/2025    CALCIUM 9.4 01/29/2025    ALBUMIN 4.3 01/29/2025    PROT 7.0 01/29/2025    BILITOT 0.5 01/29/2025    ALKPHOS 85 01/29/2025    ALT 22 01/29/2025    AST 15 01/29/2025    GLUCOSE 89 01/29/2025     Lab Results   Component Value Date    CHOL 187 01/29/2025    LDLCALC 126 (H) 01/29/2025    TRIG 162 (H) 01/29/2025    HDL 33 (L) 01/29/2025    LDLF 144 (H) 07/06/2023        Comments:     CMP:    Lipid panel:    Vitamin D:    A1C:      Anthropometrics:   Ht Readings from Last 1 Encounters:   02/05/25 1.524 m (5')      BMI Readings from Last 1 Encounters:   02/05/25 43.75 kg/m²      Wt Readings from Last 10 Encounters:   02/05/25 102 kg (224 lb)   01/21/25 101 kg (222 lb)   12/16/24 102 kg (225 lb)   12/04/24 102 kg (225 lb)   11/11/24 102 kg (224 lb)   10/03/24 90.7 kg (200 lb)   07/02/24 93 kg (205 lb)   05/30/24 97.1 kg (214 lb 1.1 oz)   05/16/24 97.1 kg (214 lb)   04/19/24 96.2 kg (212 lb)        Visit Summary   Kandi shared she has tried many plans and diets to lose weight and these do not work for her. She states she has struggled with trying to lose weight her whole life. She stopped smoking in January and since then she has been eating constantly. She had a rough few years with family care, loss of her mom and working 2 jobs. Her why is that she wants to be able to have energy to enjoy being with her son. She is frustrated as she would like to get on one of the weight management medications such as Zepbound or Wegovy and these are not covered by insurance. She started walking  for about 20 minutes a day a few days ago as she has a walking pad at home. Her knees are bad so she has to limit activity. Kandi mentioned depression and offered to refer for counseling. She was open to potential with Personalized Care program and this RD will place referral. She also was looking for more help with weight loss and possibly a medication that is covered so provided link and phone number to call  [facility]. We did not get through the full assessment today as focus was on going over basics for ensuring adequate fueling and regular balanced meals as a baseline. Reviewed hunger/fullness tool for her to use to just notice where she is during the day as she states she is hungry all day. She states she is not physically hungry and it is more emotional eating which is a good thing to realize. We talked about other ways to get comfort vs food, smoking etc and to realize that we are only trying to soothe ourselves which is not a bad thing, but by noticing how we feel and finding ways of comfort that are in line with our health goals can be very helpful. We ran out of time and all the information regarding [facility] and their free retreats, hunger/fullness handout/ healthy plate were emailed to her.       Nutrition Diagnosis:  Diagnosis Statement 1:  Diagnosis Status: New  Diagnosis : Unbalanced Diet Pattern related to increased psychological/life stress as evidenced by binge eating patterns      Nutrition Interventions:  Provided nutrition education on the following topic(s): Plate Method using ACONutritionCounseling: Motivational Interviewing  Referred pt to Coordination of Care: Behavioral Health Specialist and   [facility]  Discussed with pt? Yes  Provided handouts on: Hunger fullness tool, healthy plate    Nutrition Goals:   Review handouts and just notice during the day hunger/fullness number    Aim to have a balanced plate at meals with lean protein,  vegetables, whole grain such as whole wheat bread, quinoa, brown rice or potato or other starch as shown on the handout  Continue walking daily or as often as you can for 20-30 minutes regardless of number of steps - you are doing great with this.  [facility] with [facility] at  [facility] as glad you got an appointment quickly!      Follow up Plan  Will follow-up x 2 wk

## 2025-02-28 DIAGNOSIS — G47.00 INSOMNIA, UNSPECIFIED: ICD-10-CM

## 2025-03-04 ENCOUNTER — APPOINTMENT (OUTPATIENT)
Dept: ENDOCRINOLOGY | Facility: CLINIC | Age: 55
End: 2025-03-04
Payer: COMMERCIAL

## 2025-03-04 DIAGNOSIS — E78.2 MIXED HYPERLIPIDEMIA: ICD-10-CM

## 2025-03-04 DIAGNOSIS — G89.29 CHRONIC PAIN OF BOTH KNEES: ICD-10-CM

## 2025-03-04 DIAGNOSIS — R73.03 PREDIABETES: ICD-10-CM

## 2025-03-04 DIAGNOSIS — E66.813 CLASS 3 SEVERE OBESITY WITHOUT SERIOUS COMORBIDITY WITH BODY MASS INDEX (BMI) OF 40.0 TO 44.9 IN ADULT, UNSPECIFIED OBESITY TYPE: Primary | ICD-10-CM

## 2025-03-04 DIAGNOSIS — F43.9 STRESS: ICD-10-CM

## 2025-03-04 DIAGNOSIS — M25.561 CHRONIC PAIN OF BOTH KNEES: ICD-10-CM

## 2025-03-04 DIAGNOSIS — M25.562 CHRONIC PAIN OF BOTH KNEES: ICD-10-CM

## 2025-03-04 DIAGNOSIS — E66.01 CLASS 3 SEVERE OBESITY WITHOUT SERIOUS COMORBIDITY WITH BODY MASS INDEX (BMI) OF 40.0 TO 44.9 IN ADULT, UNSPECIFIED OBESITY TYPE: Primary | ICD-10-CM

## 2025-03-04 PROCEDURE — 99205 OFFICE O/P NEW HI 60 MIN: CPT | Performed by: NURSE PRACTITIONER

## 2025-03-04 ASSESSMENT — ENCOUNTER SYMPTOMS
NERVOUS/ANXIOUS: 0
PALPITATIONS: 0
WHEEZING: 0
FREQUENCY: 0
POLYDIPSIA: 0
FATIGUE: 1
ARTHRALGIAS: 0
DYSPHORIC MOOD: 0
NAUSEA: 0
CONSTIPATION: 0
POLYPHAGIA: 0
NUMBNESS: 0
SHORTNESS OF BREATH: 0

## 2025-03-04 NOTE — PATIENT INSTRUCTIONS
Nutrition: Consult with dietitian  Physical Exercise: Please make appointment with physical therapy, I think that you would benefit from knowing what exercises  Medications: Please see below information on the medications. We discussed these medications in the visit. When insurance is not covering we have used [facility] where it is 200-275 per month. Another option is Phentermine, Qsymia and Contrave, please see below and information sent on [employer]   We will need to have a patient agreement as this is a controlled substance  and you will need in person visit 1 time every 3 months for weight, heart rate, blood pressure to be obtained. A 5% weight loss will need to be achieved in the initial 12 week to remain on this medication.  I will refer you to case management to get assistance with therapy  Follow up: Dietitian, Group visit, Physical Therapy, Therapist

## 2025-03-04 NOTE — Clinical Note
Please schedule with dietitian kvng, schedule her 1 month later for in person due to phentermine for group visit

## 2025-03-04 NOTE — PROGRESS NOTES
"This is a virtual visit where physical exam is limited and ROS and hx is obtained.    Kandi Bliss presents for weight loss management and obesity consult today. Referred by her pharmacist. She says that \"nothing is working and I am prediabetic\". She notes she has had no luck suppressing the appetite and that when she gets depressed and she then says \"I eat then eat a lot, my mom just passed and sister has cancer, I also have custody of my great nephew and he has ADD\" She has very little support. She says she gets discouraged, gets depressed then eat and then feels bad about it \"I need something to help\"    She has [facility] insurance   PMH: obesity, HLD, prediabetes, stress    Obesity History:  Childhood or teen obesity history: no  Age of Onset: mid adult years  Lowest adult weight: 180's  Highest adult weight: 224  Current weight: 224     Family history of obesity: no    Biggest challenge with weight management: \"eating from stress\"  Goals: \"to get healthier\"     History of Weight Loss Efforts: yes  Successful weight loss techniques attempted:  nothing  Unsuccessful weight loss techniques attempted:  Curves, Weight Watchers, multiple diets  \"lose it and regain or I get depressed and start eating again\"    Current typical daily diet:  Typical Breakfast: \"sometimes I skip\"  Typical Lunch: salad, sandwich  Typical Dinner: \"yesterday broccoli and scallops\"  Soda/latte weekly intake:  Take out/carry out/fast food weekly: everyday  Portion sizes/seconds with meals: large    Snacking  Daytime snacks: yes  Evening snacks: yes      Describe Appetite (always hungry/no hunger/average): \"throughout the day I thingk I am hungry\"  Food noise: yes  Are you full after a meal?  Sometimes, \"sometimes I am full but still eat\"  Emotional eater? Yes   Boredom eater? Yes     Current Exercise Habits  limited due to knee pain, was using walking pad but this hurt her knees    Sleep patterns (insomnia, waking in night) /hours of sleep per " night: her hx notes KRISTA, she was prescribed a CPAP but she could not afford it  H/O Sleep apnea: yes  Well rested? No     Increased Stressors (describe daily stress): yes      Any intake of an appetite suppressant or an anti-obesity medicine? No     H/O Pancreatitis: no  H/O Thyroid Medullary Cancer: no    Past Medical History:   Diagnosis Date    Abnormal Heart Score CT 07/17/2023    Total  24.31    Anxiety     Cardiology follow-up encounter 04/21/2023    Quinn NEWELL MD    GERD (gastroesophageal reflux disease)     Glaucoma suspect of both eyes     Hyperlipidemia     Insomnia     Internal derangement of knee, unspecified laterality     Loose body in knee, right knee     Plan: Right Knee Arthroscopy; Meniscectomy 11/14/24    Moderate alcohol dependence (Multi)     Obesity     Psoriatic arthritis (Multi)     Sleep apnea     Tobacco abuse     Vertigo     Vitamin D insufficiency        Current Outpatient Medications   Medication Sig Dispense Refill    ALPRAZolam (Xanax) 0.5 mg tablet Take 1 tablet (0.5 mg) by mouth 2 times a day as needed for anxiety. To fill 2/5/2025 60 tablet 0    atomoxetine (Strattera) 18 mg capsule Take 1 capsule (18 mg) by mouth once daily. Swallow capsule whole; do not open. If opened accidentally, do not touch eyes; wash hands immediately (product is an eye irritant). 30 capsule 0    atorvastatin (Lipitor) 40 mg tablet Take 1 tablet daily 90 tablet 1    clobetasol (Temovate) 0.05 % ointment Apply topically 2 times a day as needed (skin inflammation). Apply to affected areas on arms/feet as needed 45 g 3    dupilumab (Dupixent Syringe) 300 mg/2 mL prefilled syringe INJECT 300 MG (1 SYRINGE) UNDER THE SKIN EVERY OTHER WEEK, AS DIRECTED 4 mL 7    ergocalciferol (Vitamin D-2) 1.25 MG (39290 UT) capsule Take 1 capsule (1,250 mcg) by mouth 1 (one) time per week. 12 capsule 0    famotidine (Pepcid) 40 mg tablet Take 1 tablet (40 mg) by mouth 2 times a day. (Patient not taking: Reported on  2/5/2025) 30 tablet 1    fluticasone (Flonase) 50 mcg/actuation nasal spray Administer 2 sprays into each nostril once daily. Shake gently. Before first use, prime pump. After use, clean tip and replace cap. 16 g 5    hydrocortisone (Anusol-HC) 2.5 % rectal cream Insert into the rectum 4 times a day as needed for hemorrhoids (rectal discomfort). Apply to affected areas 30 g 0    levocetirizine (Xyzal) 5 mg tablet Take 1 tablet (5 mg) by mouth once daily in the evening. 30 tablet 3    nicotine (Nicoderm CQ) 14 mg/24 hr patch Place 1 patch over 24 hours on the skin once every 24 hours. 30 patch 0    potassium chloride CR 10 mEq ER tablet Take 1 tablet (10 mEq) by mouth 2 times a day. Do not crush, chew, or split. 60 tablet 5    tacrolimus (Protopic) 0.1 % ointment Apply topically 2 times a day as needed (skin inflammation). Apply to affected areas of armpits as needed 60 g 3    terbinafine (LamISIL) 250 mg tablet Take 1 tablet (250 mg) by mouth once daily. 90 tablet 0    zolpidem (Ambien) 10 mg tablet Take 1 tablet (10 mg) by mouth as needed at bedtime for sleep. 30 tablet 1     No current facility-administered medications for this visit.           Review of Systems  Review of Systems   Constitutional:  Positive for fatigue.        Stress   Respiratory:  Negative for shortness of breath and wheezing.    Cardiovascular:  Negative for chest pain and palpitations.   Gastrointestinal:  Negative for constipation and nausea.   Endocrine: Negative for polydipsia, polyphagia and polyuria.   Genitourinary:  Negative for frequency and urgency.   Musculoskeletal:  Negative for arthralgias.   Skin:  Negative for rash.   Neurological:  Negative for numbness.   Psychiatric/Behavioral:  Negative for dysphoric mood. The patient is not nervous/anxious.            Objective   There were no vitals taken for this visit.    Physical Exam  Physical Exam  Neurological:      Mental Status: She is alert and oriented to person, place, and  time.   Psychiatric:         Mood and Affect: Mood normal.         Behavior: Behavior normal.         Thought Content: Thought content normal.         Judgment: Judgment normal.         Lab Review  Lab Results   Component Value Date    HGBA1C 6.0 (H) 01/29/2025     Lab Results   Component Value Date    LDLCALC 126 (H) 01/29/2025     FIB-4 Calculation: 0.46 at 9/20/2024  8:48 AM  Calculated from:  SGOT/AST: 16 U/L at 9/20/2024  8:48 AM  SGPT/ALT: 25 U/L at 9/20/2024  8:48 AM  Platelets: 379 x10*3/uL at 9/20/2024  8:48 AM  Age: 54 years    Weight Trends  Trends of weight reviewed in visit, see graph below:  Wt Readings from Last 3 Encounters:   02/05/25 102 kg (224 lb)   01/21/25 101 kg (222 lb)   12/16/24 102 kg (225 lb)     Assessment/Plan     Follow up and Goals:    Nutrition: Consult with dietitian  Physical Exercise: Please make appointment with physical therapy, I think that you would benefit from knowing what exercises  Medications: Please see below information on the medications. We discussed these medications in the visit. When insurance is not covering we have used "[facility] where it is 200-275 per month. Another option is Phentermine, Qsymia and Contrave, please see below and information sent on [employer]   We will need to have a patient agreement as this is a controlled substance  and you will need in person visit 1 time every 3 months for weight, heart rate, blood pressure to be obtained. A 5% weight loss will need to be achieved in the initial 12 week to remain on this medication.  I will refer you to case management to get assistance with therapy  Follow up: Dietitian, Group visit, Physical Therapy, Therapist      Visit length of 45 minutes      Problem List Items Addressed This Visit       Prediabetes    Hyperlipidemia    Class 3 severe obesity without serious comorbidity with body mass index (BMI) of 40.0 to 44.9 in adult - Primary    Relevant Orders    Referral to Nutrition Services    Stress     Relevant Orders    Referral to Population Health Services     Other Visit Diagnoses       Chronic pain of both knees        Relevant Orders    Referral to Physical Therapy    Referral to Population Health Services            Diet interventions: referral to dietitian for guidance in these changes  Discussed Mediterranean Diet, given pamphlet or it will be mailed, we looked at ADA plate, portion sizes, recipes. Advised to review in preparation for nutrition consult    Handouts given:  yes    Exercise intervention:   We discussed the importance of incorporating resistance and free weight  lifting into physical activity routine to prevent muscle wasting with weight loss, enhance bone health, the positive role increased muscle has in burning fat at rest. We looked at examples of these types of exercise routines online. Advised to do modifications. Advised to check with PCP if there is a h/o musculoskeletal injury or hx. We discussed benefits of walking with weight loss and as a cardio form of exercise. Gradually increase exercise to a goal of 150 minutes at least per week.    Follow Up:  Referred to nutrition or continue to work with current dietitian   Discussed obesity medications, side effects and mechanism of action reviewed with patient  Discussed physical activity: we reviewed "[employer] videos for strength training, advised to use modifications for these videos, we discussed benefits of strength training and resistance bands  on bone and muscle health, we discussed walking and water aerobic options for cardio  Discussed Mediterranean Diet, given pamphlet or it will be mailed, we looked at ADA plate, portion sizes, recipes. Advised to review Mediterranean Meal Plan booklet  in preparation for nutrition consult  ....  1 month group visit and nutrition visit in person or  virtual  Please reach out if you need anything or have further questions

## 2025-03-05 RX ORDER — ZOLPIDEM TARTRATE 10 MG/1
10 TABLET ORAL NIGHTLY PRN
Qty: 30 TABLET | Refills: 1 | Status: SHIPPED | OUTPATIENT
Start: 2025-03-05

## 2025-03-06 ENCOUNTER — DOCUMENTATION (OUTPATIENT)
Dept: ENDOCRINOLOGY | Facility: CLINIC | Age: 55
End: 2025-03-06
Payer: COMMERCIAL

## 2025-03-06 DIAGNOSIS — E66.813 CLASS 3 SEVERE OBESITY WITHOUT SERIOUS COMORBIDITY WITH BODY MASS INDEX (BMI) OF 40.0 TO 44.9 IN ADULT, UNSPECIFIED OBESITY TYPE: Primary | ICD-10-CM

## 2025-03-06 DIAGNOSIS — E66.01 CLASS 3 SEVERE OBESITY WITHOUT SERIOUS COMORBIDITY WITH BODY MASS INDEX (BMI) OF 40.0 TO 44.9 IN ADULT, UNSPECIFIED OBESITY TYPE: Primary | ICD-10-CM

## 2025-03-06 RX ORDER — PHENTERMINE HYDROCHLORIDE 37.5 MG/1
37.5 TABLET ORAL
Qty: 30 TABLET | Refills: 1 | Status: SHIPPED | OUTPATIENT
Start: 2025-03-06 | End: 2025-05-05

## 2025-03-10 ENCOUNTER — APPOINTMENT (OUTPATIENT)
Dept: ENDOCRINOLOGY | Facility: CLINIC | Age: 55
End: 2025-03-10
Payer: COMMERCIAL

## 2025-03-11 ENCOUNTER — TELEPHONE (OUTPATIENT)
Dept: PRIMARY CARE | Facility: CLINIC | Age: 55
End: 2025-03-11

## 2025-03-11 ENCOUNTER — APPOINTMENT (OUTPATIENT)
Dept: ENDOCRINOLOGY | Facility: CLINIC | Age: 55
End: 2025-03-11
Payer: COMMERCIAL

## 2025-03-11 NOTE — TELEPHONE ENCOUNTER
Pt started phentermine 3 days ago. Pt says she is having indigestion. Pt took dulcolax on Sunday and feels gassy. Pain/pressure behind left breast across stomach area. Please advise.

## 2025-03-12 ENCOUNTER — PATIENT OUTREACH (OUTPATIENT)
Dept: CARE COORDINATION | Facility: CLINIC | Age: 55
End: 2025-03-12

## 2025-03-12 ENCOUNTER — APPOINTMENT (OUTPATIENT)
Dept: CARE COORDINATION | Facility: CLINIC | Age: 55
End: 2025-03-12
Payer: COMMERCIAL

## 2025-03-12 NOTE — PROGRESS NOTES
Called Kandi as we had an appointment for follow up for today, but noticed it was not on the schedule anymore. Called her to confirm that she cancelled it as no Epic notification was received on this RD's end. Kandi states she did cancel it as there was confusion with working with  [facility] team. She updated on that and for now will work with them as she has a lot going on with appointments. She has this RD's number should she need to reach out in the future. Thanked her for the updates.

## 2025-03-17 ENCOUNTER — PHARMACY VISIT (OUTPATIENT)
Dept: PHARMACY | Facility: CLINIC | Age: 55
End: 2025-03-17
Payer: COMMERCIAL

## 2025-03-17 ENCOUNTER — SPECIALTY PHARMACY (OUTPATIENT)
Dept: PHARMACY | Facility: CLINIC | Age: 55
End: 2025-03-17

## 2025-03-17 PROCEDURE — RXMED WILLOW AMBULATORY MEDICATION CHARGE

## 2025-03-18 ENCOUNTER — APPOINTMENT (OUTPATIENT)
Dept: OPHTHALMOLOGY | Facility: CLINIC | Age: 55
End: 2025-03-18
Payer: COMMERCIAL

## 2025-04-05 DIAGNOSIS — E55.9 VITAMIN D DEFICIENCY, UNSPECIFIED: ICD-10-CM

## 2025-04-08 ENCOUNTER — PHARMACY VISIT (OUTPATIENT)
Dept: PHARMACY | Facility: CLINIC | Age: 55
End: 2025-04-08
Payer: COMMERCIAL

## 2025-04-08 ENCOUNTER — SPECIALTY PHARMACY (OUTPATIENT)
Dept: PHARMACY | Facility: CLINIC | Age: 55
End: 2025-04-08

## 2025-04-08 PROCEDURE — RXMED WILLOW AMBULATORY MEDICATION CHARGE

## 2025-04-08 RX ORDER — ERGOCALCIFEROL 1.25 MG/1
1 CAPSULE ORAL
Qty: 12 CAPSULE | Refills: 0 | Status: SHIPPED | OUTPATIENT
Start: 2025-04-13

## 2025-04-10 ENCOUNTER — APPOINTMENT (OUTPATIENT)
Dept: OPHTHALMOLOGY | Facility: CLINIC | Age: 55
End: 2025-04-10
Payer: COMMERCIAL

## 2025-04-15 ENCOUNTER — TELEPHONE (OUTPATIENT)
Dept: PRIMARY CARE | Facility: CLINIC | Age: 55
End: 2025-04-15
Payer: COMMERCIAL

## 2025-04-17 ENCOUNTER — APPOINTMENT (OUTPATIENT)
Dept: ENDOCRINOLOGY | Facility: CLINIC | Age: 55
End: 2025-04-17
Payer: COMMERCIAL

## 2025-04-17 PROBLEM — Z98.84 BARIATRIC SURGERY STATUS: Status: ACTIVE | Noted: 2025-04-17

## 2025-04-17 PROBLEM — E66.01 SEVERE OBESITY (BMI >= 40) (MULTI): Status: ACTIVE | Noted: 2025-04-17

## 2025-04-17 PROBLEM — G43.909 MIGRAINE HEADACHE: Status: ACTIVE | Noted: 2025-04-17

## 2025-04-17 PROBLEM — M51.26 HERNIATED NUCLEUS PULPOSUS, LUMBAR: Status: ACTIVE | Noted: 2025-04-17

## 2025-04-17 PROBLEM — F41.9 ANXIETY DISORDER: Status: ACTIVE | Noted: 2025-04-17

## 2025-04-17 PROBLEM — M53.9 MULTILEVEL DEGENERATIVE DISC DISEASE: Status: ACTIVE | Noted: 2025-04-17

## 2025-04-22 ENCOUNTER — OFFICE VISIT (OUTPATIENT)
Dept: PRIMARY CARE | Facility: CLINIC | Age: 55
End: 2025-04-22
Payer: COMMERCIAL

## 2025-04-22 ENCOUNTER — TELEPHONE (OUTPATIENT)
Dept: PRIMARY CARE | Facility: CLINIC | Age: 55
End: 2025-04-22

## 2025-04-22 VITALS — SYSTOLIC BLOOD PRESSURE: 130 MMHG | DIASTOLIC BLOOD PRESSURE: 82 MMHG | WEIGHT: 218 LBS | BODY MASS INDEX: 42.58 KG/M2

## 2025-04-22 DIAGNOSIS — G47.33 MODERATE OBSTRUCTIVE SLEEP APNEA: ICD-10-CM

## 2025-04-22 DIAGNOSIS — L40.9 PSORIASIS: ICD-10-CM

## 2025-04-22 DIAGNOSIS — Z12.31 SCREENING MAMMOGRAM FOR BREAST CANCER: ICD-10-CM

## 2025-04-22 DIAGNOSIS — R79.9 ABNORMAL BLOOD CHEMISTRY: ICD-10-CM

## 2025-04-22 DIAGNOSIS — M25.562 CHRONIC PAIN OF BOTH KNEES: ICD-10-CM

## 2025-04-22 DIAGNOSIS — Z13.0 SCREENING FOR ENDOCRINE/METABOLIC/IMMUNITY DISORDERS: ICD-10-CM

## 2025-04-22 DIAGNOSIS — Z00.00 HEALTHCARE MAINTENANCE: Primary | ICD-10-CM

## 2025-04-22 DIAGNOSIS — R10.9 ABDOMINAL DISCOMFORT: ICD-10-CM

## 2025-04-22 DIAGNOSIS — Z13.29 SCREENING FOR ENDOCRINE/METABOLIC/IMMUNITY DISORDERS: ICD-10-CM

## 2025-04-22 DIAGNOSIS — Z13.89 SCREENING FOR BLOOD OR PROTEIN IN URINE: ICD-10-CM

## 2025-04-22 DIAGNOSIS — E66.01 CLASS 3 SEVERE OBESITY WITHOUT SERIOUS COMORBIDITY WITH BODY MASS INDEX (BMI) OF 40.0 TO 44.9 IN ADULT, UNSPECIFIED OBESITY TYPE: ICD-10-CM

## 2025-04-22 DIAGNOSIS — Z13.6 SCREENING FOR CARDIOVASCULAR CONDITION: ICD-10-CM

## 2025-04-22 DIAGNOSIS — Z13.0 SCREENING FOR DEFICIENCY ANEMIA: ICD-10-CM

## 2025-04-22 DIAGNOSIS — E78.5 HYPERLIPIDEMIA, UNSPECIFIED HYPERLIPIDEMIA TYPE: ICD-10-CM

## 2025-04-22 DIAGNOSIS — E66.813 CLASS 3 SEVERE OBESITY WITHOUT SERIOUS COMORBIDITY WITH BODY MASS INDEX (BMI) OF 40.0 TO 44.9 IN ADULT, UNSPECIFIED OBESITY TYPE: ICD-10-CM

## 2025-04-22 DIAGNOSIS — G89.29 CHRONIC PAIN OF BOTH KNEES: ICD-10-CM

## 2025-04-22 DIAGNOSIS — Z13.0 SCREENING FOR ENDOCRINE, METABOLIC AND IMMUNITY DISORDER: ICD-10-CM

## 2025-04-22 DIAGNOSIS — Z13.29 SCREENING FOR ENDOCRINE, METABOLIC AND IMMUNITY DISORDER: ICD-10-CM

## 2025-04-22 DIAGNOSIS — E55.9 VITAMIN D DEFICIENCY: ICD-10-CM

## 2025-04-22 DIAGNOSIS — M25.561 CHRONIC PAIN OF BOTH KNEES: ICD-10-CM

## 2025-04-22 DIAGNOSIS — R07.89 CHEST DISCOMFORT: ICD-10-CM

## 2025-04-22 DIAGNOSIS — Z13.228 SCREENING FOR ENDOCRINE/METABOLIC/IMMUNITY DISORDERS: ICD-10-CM

## 2025-04-22 DIAGNOSIS — Z13.228 SCREENING FOR ENDOCRINE, METABOLIC AND IMMUNITY DISORDER: ICD-10-CM

## 2025-04-22 DIAGNOSIS — L40.50 ARTHROPATHIC PSORIASIS, UNSPECIFIED (MULTI): ICD-10-CM

## 2025-04-22 PROCEDURE — 99396 PREV VISIT EST AGE 40-64: CPT | Performed by: INTERNAL MEDICINE

## 2025-04-22 PROCEDURE — 99214 OFFICE O/P EST MOD 30 MIN: CPT | Performed by: INTERNAL MEDICINE

## 2025-04-22 ASSESSMENT — PATIENT HEALTH QUESTIONNAIRE - PHQ9
1. LITTLE INTEREST OR PLEASURE IN DOING THINGS: NOT AT ALL
2. FEELING DOWN, DEPRESSED OR HOPELESS: NOT AT ALL
SUM OF ALL RESPONSES TO PHQ9 QUESTIONS 1 AND 2: 0

## 2025-04-22 NOTE — PATIENT INSTRUCTIONS
Continue to drink 6-8 glasses of water     You need to cut out any alcohol since you are on a statin and this also add to your calorie intake     Please call our  [phone] to assist with scheduling

## 2025-04-22 NOTE — TELEPHONE ENCOUNTER
Patient stated she forgot to ask you if it would be ok for her to take sour sop bitters which is an organic dietary supplement  with vitamins.    Patient also wanted to let you know that her poop is orange again, this started a couple weeks ago.

## 2025-04-22 NOTE — PROGRESS NOTES
Subjective   Patient ID: Kandi Bliss is a 55 y.o. female who presents for Cyst (Wants lab orders in, cyst on back and right side pain).    HPI  Patient in for a visit  She stopped smoking then started back again with getting stressed out   She reports with sleep snoring , he was supposed to return [facility] the supplier  this was in 2022   sleep study [facility]  Moderate sleep apnea [phone] fax machine   Cyst on her back feel like it is getting bigger , not painful   Problem in the left side ,had been drinking etoh ,  she did celebrate her birthday and recently went to [place] also , no previous pancreatitis Her granddtr celebrates her 5th birthday in [place] so she drove there and her right knee still hurts it is both knees now was supposed to get procedure on her left knee she got shots on both knees   She was supposed to get an echo for preop prep with dr Hannah but did not know or did not remember   Trying to exercise , does stuff   n  Review of Systems  General: Denies fever, chills, night sweats,  Eyes: Negative for recent visual changes  Ears, Nose, Throat :  Negative for hearing changes, sinus discomfort  Dermatologic: Negative for new skin conditions, rash  Respiratory: Negative for wheezing, shortness of breath, cough  Cardiovascular: Negative for chest pain, palpitations, or leg swelling  Gastrointestinal: Negative for nausea/vomiting, abdominal pain, changes in bowel habits  Genitourinary Negative for Urinary Incontinence  urgency , frequency, discomfort   Musculoskeletal: see hpi  Neurological: Negative for headaches, dizziness    Previous history  Medical History[1]  Surgical History[2]  Social History[3]  Family History[4]  Allergies[5]  Current Outpatient Medications   Medication Instructions    ALPRAZolam (XANAX) 0.5 mg, oral, 2 times daily PRN, To fill 2/5/2025    atorvastatin (LIPITOR) 40 mg, oral, Daily    clobetasol (Temovate) 0.05 % ointment Topical, 2 times daily PRN, Apply to  affected areas on arms/feet as needed    dupilumab (Dupixent Syringe) 300 mg/2 mL prefilled syringe INJECT 300 MG (1 SYRINGE) UNDER THE SKIN EVERY OTHER WEEK, AS DIRECTED    ergocalciferol (VITAMIN D-2) 1,250 mcg, oral, Once Weekly    famotidine (PEPCID) 40 mg, oral, 2 times daily    fluticasone (Flonase) 50 mcg/actuation nasal spray 2 sprays, Each Nostril, Daily, Shake gently. Before first use, prime pump. After use, clean tip and replace cap.    hydrocortisone (Anusol-HC) 2.5 % rectal cream rectal, 4 times daily PRN, Apply to affected areas    levocetirizine (XYZAL) 5 mg, oral, Every evening    nicotine (Nicoderm CQ) 14 mg/24 hr patch 1 patch, transdermal, Every 24 hours    phentermine (ADIPEX-P) 37.5 mg, oral, Daily before breakfast    potassium chloride CR 10 mEq ER tablet 10 mEq, oral, 2 times daily, Do not crush, chew, or split.    tacrolimus (Protopic) 0.1 % ointment Topical, 2 times daily PRN, Apply to affected areas of armpits as needed    terbinafine (LAMISIL) 250 mg, oral, Daily    zolpidem (AMBIEN) 10 mg, oral, Nightly PRN       Objective       Physical Exam  Vital Signs: as recorded above  General: Well groomed, well nourished   Orientation:  Alert , oriented to time, place , and person   Mood and Affect:  Cooperative , no apparent distress normal affect  Skin: Good color, good turgor left paraspinal 2 inch diameter   Eyes: Extra ocular muscle movements intact, anicteric sclerae  Ears:  Auditory canals clear , TMS intact   Neck: Supple, full range of movement  Chest: Normal breath sounds, normal chest wall exam, symmetric, good air entry, clear to auscultation  Heart: Regular rate and rhythm, without murmur, gallop, or rubs  Abdomen soft nontender, discomfort LUQ mild lower rib margin discomfort not tender  no masses felt no hepatosplenomegaly, no rebound or guarding  BACK:  no CTLS spine tenderness, no flank tenderness  Extremities: full range of movement  bilateral UE and bilateral LE,  no lower  extremity edema  Neurological: Alert, oriented, cranial nerves II-XII intact except for visual acuity  Sensation:  Intact   Gait: normal steady      Assessment/Plan   Kandi Bliss is a 55 y.o. female who presents for the concerns below:    Problem List Items Addressed This Visit    None    TOBACCO USE DISORDER PLAN:  Counseled on strategies to help quit smoking.  Do not buy favorite cigarettes .   Explained risks of continuing.      LUQ discomfort unable to pinpoint but elicited some pain not tenderness lower rib margin and upper quadrant, no rebound or guarding , diet modification, make sure regular bowel movements, cut back on etoh / discontinue since need weight loss and keeping blood pressure     HYPERTENSION PLAN:  , taking blood pressure medication  Follow low salt diet, exercise as discussed, weight control. Continue current medications    HYPERCHOLESTEROLEMIA PLAN: stable  continue current medications  Follow low cholesterol diet, encouraged high omega 3 fatty acid intake in diet, exercise, weight control. . Will Obtain AST/ALT, fasting lipid profile, creatine kinase  on statin if not done within past 3-6 months . Coenzyme Q10 200 mg a day if able to help increase HDL.    OBESITY PLAN: Proper sleep habits, increase water intake, we'll make sure no metabolic problems weight reduction through a low calorie diet and  exercise preferred walking.  Benefits discussed with patient followup in one.  May consider consult with nutrition and endocrinology. If not done recently will recheck TSH, BMP    OBSTRUCTIVE SLEEP APNEA - stable , control of sleep apnea with use of cpap machine during sleep, positional therapy may also help by sleeping with full body length pillow to maintain the lateral recumbent position or elevation of the head of the bed by 6-8 inches and another option if these preceding treatments are not tolerated/effective is for a dental fabrication of an oral appliance to promote mandibular  advancement during sleep   Avoidance of alcohol, weight loss , and practicing proper sleep habits.  If the patient experiences excessive daytime somnolence, potentially hazardous activity , including driving when feeling sleepy , these activities should be avoided until proper treatment or readjustment of current treatment is done with Sleep Medicine specialist.  Back pain reproducible may be musculoskeletl stretch   LIPOMA same dimensions 2 inch diameter nontender no discoloration monitor consider removal if with pain     ASSESSMENT AND PLAN: Patient on examination is in good health , concerns above, screening blood tests to screen for high cholesterol, diabetes, thyroid,  Patient should be taking enough calcium in a balanced diet or supplements to total 1200 mg a day in divided doses unless with history of specific types of kidney stones. Vitamin D 800-1000 iu a day, check levels since  last lab work done showed slightly low levels.  . For Female Patients Only:PAP test yearly as per gynecology   Preventive Medicine: colon cancer screening by age 45 if no family history, balanced diet, and exercise as discussed. Seat belt use for injury prevention  Substance use and /or tobacco use not applicable / counseled when applicable. Immunizations TD  up to date.  Yearly flu vaccine unless contraindicated . More than 50% of office visit time spent counseling the patient, questions were answered. Complete physical examination in a year. Patient knows either has access to  Jacked to see results and messages regarding these or will call us if cannot see them         Discussed with:   Return in :    Portions of this note were generated using digital voice recognition software, and may contain grammatical errors       Kaleb Barnett MD  04/22/25  8:50 AM       [1]   Past Medical History:  Diagnosis Date    Abnormal Heart Score CT 07/17/2023    Total  24.31    Anxiety     Cardiology follow-up encounter  2023    Quinn NEWELL MD    GERD (gastroesophageal reflux disease)     Glaucoma suspect of both eyes     Hyperlipidemia     Insomnia     Internal derangement of knee, unspecified laterality     Loose body in knee, right knee     Plan: Right Knee Arthroscopy; Meniscectomy 24    Moderate alcohol dependence (Multi)     Obesity     Psoriatic arthritis (Multi)     Sleep apnea     Tobacco abuse     Vertigo     Vitamin D insufficiency    [2]   Past Surgical History:  Procedure Laterality Date    COLONOSCOPY  2021    HYSTERECTOMY      LAPAROTOMY SALPINGO OOPHORECTOMY      MR HEAD ANGIO WO IV CONTRAST  2018    MR HEAD ANGIO WO IV CONTRAST 2018 [facility]    MR NECK ANGIO WO IV CONTRAST  2018    MR NECK ANGIO WO IV CONTRAST 2018 [facility]    TUBAL LIGATION     [3]   Social History  Tobacco Use    Smoking status: Former     Current packs/day: 0.00     Types: Cigarettes     Quit date: 2024     Years since quittin.3     Passive exposure: Never    Smokeless tobacco: Never    Tobacco comments:     Discussed all alternatives for treatment.  Medication versus counseling versus cold turkey.  Will start on nicotine patch 5 minutes   Vaping Use    Vaping status: Never Used   Substance Use Topics    Alcohol use: Not Currently     Alcohol/week: 6.0 standard drinks of alcohol     Types: 6 Cans of beer per week    Drug use: Never   [4]   Family History  Problem Relation Name Age of Onset    Breast cancer Mother      Atrial fibrillation Mother      Glaucoma Mother      Diabetes Mother      Stroke Father      Other (tonsillar cancer) Sister Winston     Parkinsonism Maternal Grandmother     [5]   Allergies  Allergen Reactions    Avelox [Moxifloxacin] Diarrhea    Bacitracin Rash    Benzonatate Unknown     PT'S DOESN'T REMEMBER    Latex Itching and Rash    Oxycodone Itching    Penicillins Hives

## 2025-04-23 LAB
25(OH)D3+25(OH)D2 SERPL-MCNC: 46 NG/ML (ref 30–100)
ALBUMIN SERPL-MCNC: 4.6 G/DL (ref 3.6–5.1)
ALP SERPL-CCNC: 82 U/L (ref 37–153)
ALT SERPL-CCNC: 23 U/L (ref 6–29)
ANION GAP SERPL CALCULATED.4IONS-SCNC: 10 MMOL/L (CALC) (ref 7–17)
APPEARANCE UR: CLEAR
AST SERPL-CCNC: 16 U/L (ref 10–35)
BASOPHILS # BLD AUTO: 80 CELLS/UL (ref 0–200)
BASOPHILS NFR BLD AUTO: 0.8 %
BILIRUB SERPL-MCNC: 0.3 MG/DL (ref 0.2–1.2)
BILIRUB UR QL STRIP: NEGATIVE
BUN SERPL-MCNC: 16 MG/DL (ref 7–25)
CALCIUM SERPL-MCNC: 9.6 MG/DL (ref 8.6–10.4)
CHLORIDE SERPL-SCNC: 107 MMOL/L (ref 98–110)
CHOLEST SERPL-MCNC: 209 MG/DL
CHOLEST/HDLC SERPL: 4.5 (CALC)
CO2 SERPL-SCNC: 25 MMOL/L (ref 20–32)
COLOR UR: YELLOW
CREAT SERPL-MCNC: 0.65 MG/DL (ref 0.5–1.03)
EGFRCR SERPLBLD CKD-EPI 2021: 104 ML/MIN/1.73M2
EOSINOPHIL # BLD AUTO: 320 CELLS/UL (ref 15–500)
EOSINOPHIL NFR BLD AUTO: 3.2 %
ERYTHROCYTE [DISTWIDTH] IN BLOOD BY AUTOMATED COUNT: 13.9 % (ref 11–15)
EST. AVERAGE GLUCOSE BLD GHB EST-MCNC: 126 MG/DL
EST. AVERAGE GLUCOSE BLD GHB EST-SCNC: 7 MMOL/L
GLUCOSE SERPL-MCNC: 101 MG/DL (ref 65–99)
GLUCOSE UR QL STRIP: NEGATIVE
HBA1C MFR BLD: 6 %
HCT VFR BLD AUTO: 41.7 % (ref 35–45)
HDLC SERPL-MCNC: 46 MG/DL
HGB BLD-MCNC: 13.5 G/DL (ref 11.7–15.5)
HGB UR QL STRIP: NEGATIVE
KETONES UR QL STRIP: NEGATIVE
LDLC SERPL CALC-MCNC: 132 MG/DL (CALC)
LEUKOCYTE ESTERASE UR QL STRIP: NEGATIVE
LYMPHOCYTES # BLD AUTO: 3480 CELLS/UL (ref 850–3900)
LYMPHOCYTES NFR BLD AUTO: 34.8 %
MCH RBC QN AUTO: 30 PG (ref 27–33)
MCHC RBC AUTO-ENTMCNC: 32.4 G/DL (ref 32–36)
MCV RBC AUTO: 92.7 FL (ref 80–100)
MONOCYTES # BLD AUTO: 660 CELLS/UL (ref 200–950)
MONOCYTES NFR BLD AUTO: 6.6 %
NEUTROPHILS # BLD AUTO: 5460 CELLS/UL (ref 1500–7800)
NEUTROPHILS NFR BLD AUTO: 54.6 %
NITRITE UR QL STRIP: NEGATIVE
NONHDLC SERPL-MCNC: 163 MG/DL (CALC)
PH UR STRIP: NORMAL [PH] (ref 5–8)
PLATELET # BLD AUTO: 422 THOUSAND/UL (ref 140–400)
PMV BLD REES-ECKER: 10.4 FL (ref 7.5–12.5)
POTASSIUM SERPL-SCNC: 4.7 MMOL/L (ref 3.5–5.3)
PROT SERPL-MCNC: 7.5 G/DL (ref 6.1–8.1)
PROT UR QL STRIP: NEGATIVE
RBC # BLD AUTO: 4.5 MILLION/UL (ref 3.8–5.1)
SODIUM SERPL-SCNC: 142 MMOL/L (ref 135–146)
SP GR UR STRIP: 1.02 (ref 1–1.03)
TRIGL SERPL-MCNC: 178 MG/DL
TSH SERPL-ACNC: 1.17 MIU/L
WBC # BLD AUTO: 10 THOUSAND/UL (ref 3.8–10.8)

## 2025-04-23 NOTE — TELEPHONE ENCOUNTER
Patient called very anxious her platelet count is elevated at 422  Still has pain.  Would like to discuss with you     [phone]

## 2025-04-23 NOTE — TELEPHONE ENCOUNTER
Patient called back again. She stated she is very worried about her test results. She would like to talk to you whenever you are available today.

## 2025-04-24 ENCOUNTER — HOSPITAL ENCOUNTER (OUTPATIENT)
Dept: RADIOLOGY | Facility: CLINIC | Age: 55
Discharge: HOME | End: 2025-04-24
Payer: COMMERCIAL

## 2025-04-24 DIAGNOSIS — R10.12 LEFT UPPER QUADRANT ABDOMINAL PAIN: ICD-10-CM

## 2025-04-24 DIAGNOSIS — R10.12 LEFT UPPER QUADRANT ABDOMINAL PAIN: Primary | ICD-10-CM

## 2025-04-24 PROCEDURE — 74150 CT ABDOMEN W/O CONTRAST: CPT

## 2025-04-24 PROCEDURE — A9698 NON-RAD CONTRAST MATERIALNOC: HCPCS | Performed by: INTERNAL MEDICINE

## 2025-04-24 PROCEDURE — 2550000001 HC RX 255 CONTRASTS: Performed by: INTERNAL MEDICINE

## 2025-04-24 RX ADMIN — IOHEXOL 500 ML: 12 SOLUTION ORAL at 11:45

## 2025-04-25 ENCOUNTER — ANCILLARY PROCEDURE (OUTPATIENT)
Facility: CLINIC | Age: 55
End: 2025-04-25
Payer: COMMERCIAL

## 2025-04-25 DIAGNOSIS — M17.12 PRIMARY OSTEOARTHRITIS OF LEFT KNEE: ICD-10-CM

## 2025-04-25 DIAGNOSIS — R06.09 DOE (DYSPNEA ON EXERTION): ICD-10-CM

## 2025-04-25 DIAGNOSIS — Z01.810 PREOP CARDIOVASCULAR EXAM: ICD-10-CM

## 2025-04-25 PROCEDURE — 93306 TTE W/DOPPLER COMPLETE: CPT | Performed by: STUDENT IN AN ORGANIZED HEALTH CARE EDUCATION/TRAINING PROGRAM

## 2025-04-25 PROCEDURE — C8929 TTE W OR WO FOL WCON,DOPPLER: HCPCS

## 2025-04-25 NOTE — NURSING NOTE
#24 IV placed. Definity given per order. PIV flushed with 10ml NS. IV removed, dressing applied and cannula was intact. Patient tolerated well.

## 2025-04-28 LAB
AORTIC VALVE PEAK VELOCITY: 1.45 M/S
AV PEAK GRADIENT: 8 MMHG
AVA (PEAK VEL): 3.46 CM2
EJECTION FRACTION APICAL 4 CHAMBER: 74
EJECTION FRACTION: 65 %
LEFT VENTRICLE INTERNAL DIMENSION DIASTOLE: 4.27 CM (ref 3.5–6)
LEFT VENTRICULAR OUTFLOW TRACT DIAMETER: 2.3 CM
MITRAL VALVE E/A RATIO: 0.6
RIGHT VENTRICLE FREE WALL PEAK S': 21 CM/S

## 2025-04-29 ENCOUNTER — APPOINTMENT (OUTPATIENT)
Dept: CARDIOLOGY | Facility: CLINIC | Age: 55
End: 2025-04-29
Payer: COMMERCIAL

## 2025-04-29 ENCOUNTER — TELEPHONE (OUTPATIENT)
Dept: PRIMARY CARE | Facility: CLINIC | Age: 55
End: 2025-04-29
Payer: COMMERCIAL

## 2025-04-29 NOTE — TELEPHONE ENCOUNTER
Patient stated she would like to talk to you today about the the results of her echo test done on her heart.

## 2025-04-30 ENCOUNTER — HOSPITAL ENCOUNTER (OUTPATIENT)
Dept: CARDIOLOGY | Facility: HOSPITAL | Age: 55
Discharge: HOME | End: 2025-04-30
Payer: COMMERCIAL

## 2025-04-30 DIAGNOSIS — E78.5 HYPERLIPIDEMIA, UNSPECIFIED HYPERLIPIDEMIA TYPE: ICD-10-CM

## 2025-04-30 DIAGNOSIS — R79.9 ABNORMAL BLOOD CHEMISTRY: ICD-10-CM

## 2025-04-30 DIAGNOSIS — R07.89 CHEST DISCOMFORT: ICD-10-CM

## 2025-04-30 DIAGNOSIS — E66.813 CLASS 3 SEVERE OBESITY WITHOUT SERIOUS COMORBIDITY WITH BODY MASS INDEX (BMI) OF 40.0 TO 44.9 IN ADULT, UNSPECIFIED OBESITY TYPE: ICD-10-CM

## 2025-04-30 LAB — EJECTION FRACTION APICAL 4 CHAMBER: 61.6

## 2025-04-30 PROCEDURE — 93325 DOPPLER ECHO COLOR FLOW MAPG: CPT | Performed by: INTERNAL MEDICINE

## 2025-04-30 PROCEDURE — 93350 STRESS TTE ONLY: CPT | Performed by: INTERNAL MEDICINE

## 2025-04-30 PROCEDURE — 93018 CV STRESS TEST I&R ONLY: CPT | Performed by: INTERNAL MEDICINE

## 2025-04-30 PROCEDURE — 93016 CV STRESS TEST SUPVJ ONLY: CPT | Performed by: INTERNAL MEDICINE

## 2025-04-30 PROCEDURE — 93325 DOPPLER ECHO COLOR FLOW MAPG: CPT

## 2025-05-01 ENCOUNTER — OFFICE VISIT (OUTPATIENT)
Dept: CARDIOLOGY | Facility: CLINIC | Age: 55
End: 2025-05-01
Payer: COMMERCIAL

## 2025-05-01 ENCOUNTER — APPOINTMENT (OUTPATIENT)
Facility: CLINIC | Age: 55
End: 2025-05-01
Payer: COMMERCIAL

## 2025-05-01 VITALS
HEIGHT: 60 IN | RESPIRATION RATE: 20 BRPM | BODY MASS INDEX: 43.19 KG/M2 | SYSTOLIC BLOOD PRESSURE: 126 MMHG | HEART RATE: 84 BPM | DIASTOLIC BLOOD PRESSURE: 82 MMHG | WEIGHT: 220 LBS | OXYGEN SATURATION: 98 %

## 2025-05-01 DIAGNOSIS — E66.813 CLASS 3 SEVERE OBESITY WITHOUT SERIOUS COMORBIDITY WITH BODY MASS INDEX (BMI) OF 40.0 TO 44.9 IN ADULT: ICD-10-CM

## 2025-05-01 DIAGNOSIS — E78.5 HYPERLIPIDEMIA, UNSPECIFIED HYPERLIPIDEMIA TYPE: Primary | ICD-10-CM

## 2025-05-01 DIAGNOSIS — R79.9 ABNORMAL BLOOD CHEMISTRY: ICD-10-CM

## 2025-05-01 DIAGNOSIS — E66.813 CLASS 3 SEVERE OBESITY WITHOUT SERIOUS COMORBIDITY WITH BODY MASS INDEX (BMI) OF 40.0 TO 44.9 IN ADULT, UNSPECIFIED OBESITY TYPE: ICD-10-CM

## 2025-05-01 DIAGNOSIS — E78.00 PURE HYPERCHOLESTEROLEMIA, UNSPECIFIED: ICD-10-CM

## 2025-05-01 DIAGNOSIS — R07.89 CHEST DISCOMFORT: ICD-10-CM

## 2025-05-01 PROCEDURE — 99215 OFFICE O/P EST HI 40 MIN: CPT | Performed by: STUDENT IN AN ORGANIZED HEALTH CARE EDUCATION/TRAINING PROGRAM

## 2025-05-01 PROCEDURE — 3008F BODY MASS INDEX DOCD: CPT | Performed by: STUDENT IN AN ORGANIZED HEALTH CARE EDUCATION/TRAINING PROGRAM

## 2025-05-01 RX ORDER — ATORVASTATIN CALCIUM 80 MG/1
80 TABLET, FILM COATED ORAL DAILY
Qty: 90 TABLET | Refills: 3 | Status: SHIPPED | OUTPATIENT
Start: 2025-05-01 | End: 2026-05-01

## 2025-05-01 ASSESSMENT — ENCOUNTER SYMPTOMS: DEPRESSION: 0

## 2025-05-01 NOTE — PATIENT INSTRUCTIONS
We will increase atorvastatin from 40 mg once daily to 80 mg daily.    Please continue remaining medications.      Please obtain repeat blood work including comprehensive metabolic panel and lipid panel before your follow-up visit.    Please followup with me in Cardiology clinic within the next 6 months .  Please return to clinic sooner or seek emergent care if your symptoms reoccur or worsen.

## 2025-05-01 NOTE — PROGRESS NOTES
Follow-up visit    HPI:    Kandi Bliss is a 55 y.o. female with pertinent history of anxiety, active tobacco abuse, obesity, obstructive sleep apnea, psoriatic arthritis, preserved LVEF 65% with grade 1 diastolic dysfunction on echo performed 4/25/2025, no clear ischemia on treadmill echocardiogram stress test performed 4/30/2025 presents to cardiology clinic to re-establish care.     She is accompanied by a friend of hers who provides history and has questions.  We have not seen her in clinic for the last 2 years.  She states she has been doing relatively well.  Dyspnea exertion is stable.  No recent chest discomfort.  Reviewed and discussed her recent echocardiogram as well as stress test.  We did review her blood work and her cholesterol remains elevated.  No exacerbating or relieving factors.  Patient denies chest pain and angina.  Pt denies orthopnea, and paroxysmal nocturnal dyspnea.  Pt denies worsening lower extremity edema.  Pt denies palpitations or syncope.  No recent falls.  No fever or chills.  No cough.  No change in bowel or bladder habits.  No sick contacts.  No recent travel.    12 point review of systems including (Constitutional, Eyes, ENMT, Respiratory, Cardiac, Gastrointestinal, Neurological, Psychiatric, and Hematologic) was performed and is otherwise negative.    Past medical history reviewed:   has a past medical history of Abnormal Heart Score CT (07/17/2023), Anxiety, Cardiology follow-up encounter (04/21/2023), GERD (gastroesophageal reflux disease), Glaucoma suspect of both eyes, Hyperlipidemia, Insomnia, Internal derangement of knee, unspecified laterality, Loose body in knee, right knee, Moderate alcohol dependence (Multi), Obesity, Psoriatic arthritis (Multi), Sleep apnea, Tobacco abuse, Vertigo, and Vitamin D insufficiency.    Past surgical history reviewed:   has a past surgical history that includes Hysterectomy; Tubal ligation; Laparotomy salpingo oophorectomy; MR angio head  wo IV contrast (11/27/2018); MR angio neck wo IV contrast (11/27/2018); and Colonoscopy (06/04/2021).    Social history reviewed:   reports that she has been smoking cigarettes. She has never been exposed to tobacco smoke. She has never used smokeless tobacco. She reports that she does not currently use alcohol after a past usage of about 6.0 standard drinks of alcohol per week. She reports that she does not use drugs.     Family history:  No sudden cardiac death or premature coronary artery disease.      Allergies reviewed: Avelox [moxifloxacin], Bacitracin, Benzonatate, Latex, Oxycodone, and Penicillins     Medications reviewed:   Current Outpatient Medications   Medication Instructions    ALPRAZolam (XANAX) 0.5 mg, oral, 2 times daily PRN, To fill 2/5/2025    atorvastatin (LIPITOR) 40 mg, oral, Daily    clobetasol (Temovate) 0.05 % ointment Topical, 2 times daily PRN, Apply to affected areas on arms/feet as needed    dupilumab (Dupixent Syringe) 300 mg/2 mL prefilled syringe INJECT 300 MG (1 SYRINGE) UNDER THE SKIN EVERY OTHER WEEK, AS DIRECTED    ergocalciferol (VITAMIN D-2) 1,250 mcg, oral, Once Weekly    fluticasone (Flonase) 50 mcg/actuation nasal spray 2 sprays, Each Nostril, Daily, Shake gently. Before first use, prime pump. After use, clean tip and replace cap.    hydrocortisone (Anusol-HC) 2.5 % rectal cream rectal, 4 times daily PRN, Apply to affected areas    nicotine (Nicoderm CQ) 14 mg/24 hr patch 1 patch, transdermal, Every 24 hours    potassium chloride CR 10 mEq ER tablet 10 mEq, oral, 2 times daily, Do not crush, chew, or split.    tacrolimus (Protopic) 0.1 % ointment Topical, 2 times daily PRN, Apply to affected areas of armpits as needed    zolpidem (AMBIEN) 10 mg, oral, Nightly PRN        Vitals reviewed: Visit Vitals  /82 (BP Location: Right arm, Patient Position: Sitting, BP Cuff Size: Adult long)   Pulse 84   Resp 20       Physical Exam:   General:  Patient is awake, alert, and  oriented.  Patient is in no acute distress.  HEENT:  Pupils equal and reactive.  Normocephalic.  Moist mucosa.    Neck:  No thyromegaly.  Normal Jugular Venous Pressure.  Cardiovascular:  Regular rate and rhythm.  Normal S1 and S2.  1/6 STANLEY.  Pulmonary:  Clear to auscultation bilaterally.  Abdomen:  Soft. Non-tender.   Non-distended.  Positive bowel sounds.  Lower Extremities:  2+ pedal pulses. No LE edema.  Neurologic:  Cranial nerves intact.  No focal deficit.   Skin: Skin warm and dry, normal skin turgor.   Psychiatric: Normal affect.    Last Labs:  CBC -      Lab Results   Component Value Date    WBC 10.0 04/22/2025    HGB 13.5 04/22/2025    HCT 41.7 04/22/2025     (H) 04/22/2025        CMP-  Lab Results   Component Value Date    GLUCOSE 101 (H) 04/22/2025     04/22/2025    K 4.7 04/22/2025     04/22/2025    CO2 25 04/22/2025    ANIONGAP 10 04/22/2025    BUN 16 04/22/2025    CREATININE 0.65 04/22/2025    EGFR 104 04/22/2025    CALCIUM 9.6 04/22/2025    PROT 7.5 04/22/2025    ALBUMIN 4.6 04/22/2025    AST 16 04/22/2025    ALT 23 04/22/2025    ALKPHOS 82 04/22/2025    BILITOT 0.3 04/22/2025        LIPIDS-  Lab Results   Component Value Date    CHOL 209 (H) 04/22/2025    TRIG 178 (H) 04/22/2025    HDL 46 (L) 04/22/2025    CHHDL 4.5 04/22/2025    VLDL 54 (H) 05/16/2024        OTHERS-  Lab Results   Component Value Date    HGBA1C 6.0 (H) 04/22/2025        I personally reviewed the patient's recent vitals, labs, medications, orders, EKGs, pertinent cardiac imaging/ echocardiography and ischemic evaluations including stress testing.    Assessment and Plan:    Kandi Bliss is a 55 y.o. female with pertinent history of anxiety, active tobacco abuse, obesity, obstructive sleep apnea, psoriatic arthritis, preserved LVEF 65% with grade 1 diastolic dysfunction on echo performed 4/25/2025, no clear ischemia on treadmill echocardiogram stress test performed 4/30/2025 presents to cardiology clinic to  re-establish care. She is accompanied by a friend of hers who provides history and has questions.  We have not seen her in clinic for the last 2 years.  She states she has been doing relatively well.  Dyspnea exertion is stable.  No recent chest discomfort.  Reviewed and discussed her recent echocardiogram as well as stress test.  We did review her blood work and her cholesterol remains elevated.      We will increase atorvastatin from 40 mg once daily to 80 mg daily.    Please continue remaining medications.      Please obtain repeat blood work including comprehensive metabolic panel and lipid panel before your follow-up visit.    Please followup with me in Cardiology clinic within the next 6 months .  Please return to clinic sooner or seek emergent care if your symptoms reoccur or worsen.    Thank you for allowing me to participate in their care.  Please feel free to call me with any further questions or concerns.        Quinn Hannah MD, FACC, [place]  Division of Cardiovascular Medicine  System Director, Nuclear Cardiology   Medical Director, [facility] [place], [facility]   Clinical , [facility] [employer]  [email or web address]   Office:  [phone]

## 2025-05-02 ENCOUNTER — TELEPHONE (OUTPATIENT)
Dept: CARDIOLOGY | Facility: CLINIC | Age: 55
End: 2025-05-02
Payer: COMMERCIAL

## 2025-05-02 DIAGNOSIS — E66.2 CLASS 3 OBESITY WITH ALVEOLAR HYPOVENTILATION, SERIOUS COMORBIDITY, AND BODY MASS INDEX (BMI) OF 40.0 TO 44.9 IN ADULT: ICD-10-CM

## 2025-05-02 DIAGNOSIS — E66.813 CLASS 3 OBESITY WITH ALVEOLAR HYPOVENTILATION, SERIOUS COMORBIDITY, AND BODY MASS INDEX (BMI) OF 40.0 TO 44.9 IN ADULT: ICD-10-CM

## 2025-05-02 DIAGNOSIS — E66.813 CLASS 3 SEVERE OBESITY WITH BODY MASS INDEX (BMI) OF 40.0 TO 44.9 IN ADULT, UNSPECIFIED OBESITY TYPE, UNSPECIFIED WHETHER SERIOUS COMORBIDITY PRESENT: Primary | ICD-10-CM

## 2025-05-02 DIAGNOSIS — E78.5 HYPERLIPIDEMIA, UNSPECIFIED HYPERLIPIDEMIA TYPE: ICD-10-CM

## 2025-05-02 DIAGNOSIS — R73.03 PREDIABETES: ICD-10-CM

## 2025-05-02 NOTE — TELEPHONE ENCOUNTER
Patient came into clinic questioning if she should start 81mg aspirin.   Spoke with Dr. Hannah who recommended Given her age and recent stress test being normal, hold off on aspirin 81 mg at this point. If she becomes higher risk as she gets older we would consider it.   Called patient to give her the recommendation. All questions answered.

## 2025-05-06 ENCOUNTER — SPECIALTY PHARMACY (OUTPATIENT)
Dept: PHARMACY | Facility: CLINIC | Age: 55
End: 2025-05-06

## 2025-05-08 ENCOUNTER — PHARMACY VISIT (OUTPATIENT)
Dept: PHARMACY | Facility: CLINIC | Age: 55
End: 2025-05-08
Payer: COMMERCIAL

## 2025-05-08 ENCOUNTER — APPOINTMENT (OUTPATIENT)
Dept: PRIMARY CARE | Facility: CLINIC | Age: 55
End: 2025-05-08
Payer: COMMERCIAL

## 2025-05-08 PROCEDURE — RXMED WILLOW AMBULATORY MEDICATION CHARGE

## 2025-05-09 ENCOUNTER — APPOINTMENT (OUTPATIENT)
Dept: PRIMARY CARE | Facility: CLINIC | Age: 55
End: 2025-05-09
Payer: COMMERCIAL

## 2025-05-13 ENCOUNTER — APPOINTMENT (OUTPATIENT)
Dept: OPHTHALMOLOGY | Facility: CLINIC | Age: 55
End: 2025-05-13
Payer: COMMERCIAL

## 2025-05-16 ENCOUNTER — APPOINTMENT (OUTPATIENT)
Dept: PHARMACY | Facility: HOSPITAL | Age: 55
End: 2025-05-16
Payer: COMMERCIAL

## 2025-05-16 DIAGNOSIS — E66.813 CLASS 3 SEVERE OBESITY WITH BODY MASS INDEX (BMI) OF 40.0 TO 44.9 IN ADULT, UNSPECIFIED OBESITY TYPE, UNSPECIFIED WHETHER SERIOUS COMORBIDITY PRESENT: ICD-10-CM

## 2025-05-16 NOTE — PROGRESS NOTES
Pharmacist Clinic: Weight Management    Kandi Bliss was referred to the Clinical Pharmacy Team for weight management.     Referring Provider: Kaleb Barnett MD  Problem List Items Addressed This Visit    None  Visit Diagnoses         Class 3 severe obesity with body mass index (BMI) of 40.0 to 44.9 in adult, unspecified obesity type, unspecified whether serious comorbidity present              Patient had appointment with clinical pharmacy in the past, did not have insurance coverage of GLP1 medications for weight loss and elected not to proceed with cash pay Zepbound vials at that time.    She had updated labs and follow up appts with her providers recently and wanted to know if she would qualify for GLP1 medication coverage at this point.     HISTORY OF PRESENT ILLNESS    Diet: trying to avoid fried food    Exercise Routine: treadmill at home, has knee pain and has appt with rheumatology and procedure in January     Additional Contributory Factors: stress- family health concerns, job stress     Weight  224 lb (initial)    PHARMACY ASSESSMENT  Pertinent PMH Review:  - PMH of Pancreatitis: No  - PMH of Retinopathy: No  - PMH of MTC: No    Allergies: Avelox [moxifloxacin], Bacitracin, Benzonatate, Latex, Oxycodone, and Penicillins     [facility] - [place], [place] - [address]  [address] [address]  Phone: [phone] Fax: [phone]     Specialty Pharmacy  [address] [place] [address]  Phone: [phone] Fax: [phone]    [facility] - [place] [address]  [facility] [address]  Phone: [phone] Fax: [phone]    Affordability/Accessibility: weight loss plan exclusion, Ozempic PA denied, patient moving forward with Zepbound vial cash pay direct from   Adherence/Organization: None  Adverse Effects: None    CURRENT PHARMACOTHERAPY  - none     DRUG  INTERACTIONS  - No significant drug-drug interactions exist that require adjustment to therapy    LAB  Lab Results   Component Value Date    BILITOT 0.3 04/22/2025    CALCIUM 9.6 04/22/2025    CO2 25 04/22/2025     04/22/2025    CREATININE 0.65 04/22/2025    GLUCOSE 101 (H) 04/22/2025    ALKPHOS 82 04/22/2025    K 4.7 04/22/2025    PROT 7.5 04/22/2025     04/22/2025    AST 16 04/22/2025    ALT 23 04/22/2025    BUN 16 04/22/2025    ANIONGAP 10 04/22/2025    ALBUMIN 4.6 04/22/2025    LIPASE 31 07/17/2019    EGFR 104 04/22/2025     Lab Results   Component Value Date    TRIG 178 (H) 04/22/2025    CHOL 209 (H) 04/22/2025    LDLCALC 132 (H) 04/22/2025    HDL 46 (L) 04/22/2025     Lab Results   Component Value Date    HGBA1C 6.0 (H) 04/22/2025       Current Outpatient Medications on File Prior to Visit   Medication Sig Dispense Refill    ALPRAZolam (Xanax) 0.5 mg tablet Take 1 tablet (0.5 mg) by mouth 2 times a day as needed for anxiety. To fill 2/5/2025 60 tablet 0    atorvastatin (Lipitor) 80 mg tablet Take 1 tablet (80 mg) by mouth once daily. 90 tablet 3    clobetasol (Temovate) 0.05 % ointment Apply topically 2 times a day as needed (skin inflammation). Apply to affected areas on arms/feet as needed 45 g 3    dupilumab (Dupixent Syringe) 300 mg/2 mL prefilled syringe INJECT 300 MG (1 SYRINGE) UNDER THE SKIN EVERY OTHER WEEK, AS DIRECTED 4 mL 7    fluticasone (Flonase) 50 mcg/actuation nasal spray Administer 2 sprays into each nostril once daily. Shake gently. Before first use, prime pump. After use, clean tip and replace cap. 16 g 5    zolpidem (Ambien) 10 mg tablet Take 1 tablet (10 mg) by mouth as needed at bedtime for sleep. 30 tablet 1     No current facility-administered medications on file prior to visit.       PATIENT EDUCATION/GOALS  - Counseled patient on MOA, expectations, side effects, duration of therapy, contraindications, administration, and monitoring parameters  - Answered all patient  questions and concerns; provided [facility] phone number if issues/questions arise    RECOMMENDATIONS/PLAN  Discussed coverage with FDA approved weight loss medications during our encounter today. She does meet appropriate criteria set by the FDA for weight-loss management medication therapy. At this time her pharmaceutical benefit does provide coverage for weight loss therapy (I.e. Zepbound, Wegovy, Saxenda). We discussed alternative options including the out of pocket purchasing of Zepbound via vial and syringe from the  starting at $349/month for the 2.5 mg dose or $499/month for the 5 mg, 7.5 mg and 10 mg dose. Special offer pricing for 1st fill and refills made within 45 days of prior delivery.     I reviewed the copay card cost for both Wegovy and Zepbound. Zepbound is a standard fixed cost at $650/month regardless of the dose selected. Wegovy is a standard fixed cost at $499/month regardless of the dose selected.    Finally, we reviewed the criteria for use of medications such as Ozempic, Mounjaro and Trulicity. She does not have type two diabetes mellitus, prediabetes, or any existing endocrine issue that would necessitate use of any aforementioned therapy.  Her A1c is unchanged since our initial discussion.     At this time She declined purchasing a GLP1 out of pocket. Discussed following a 6 month comprehensive weight loss program and reviewing alternative health plan benefits.     Encouraged patient to follow with endocrinology CNP who she had met with for further discussion on weight management options.      Clinical Pharmacist follow up: not scheduled, patient provided pharmacist contact information   Type of Encounter: virtual    Continue all meds under the continuation of care with the referring provider and clinical pharmacy team.    Christin Stafford, PharmD  Clinical Pharmacy Specialist, Primary Care   [phone]    Verbal consent to manage patient's drug therapy was obtained from patient. They  were informed they may decline to participate or withdraw from participation in pharmacy services at any time.

## 2025-05-21 ENCOUNTER — APPOINTMENT (OUTPATIENT)
Dept: OPHTHALMOLOGY | Facility: CLINIC | Age: 55
End: 2025-05-21
Payer: COMMERCIAL

## 2025-05-28 ENCOUNTER — APPOINTMENT (OUTPATIENT)
Dept: OPHTHALMOLOGY | Facility: CLINIC | Age: 55
End: 2025-05-28
Payer: COMMERCIAL

## 2025-05-29 ENCOUNTER — TELEPHONE (OUTPATIENT)
Dept: PRIMARY CARE | Facility: CLINIC | Age: 55
End: 2025-05-29
Payer: COMMERCIAL

## 2025-05-29 DIAGNOSIS — E78.00 PURE HYPERCHOLESTEROLEMIA, UNSPECIFIED: ICD-10-CM

## 2025-05-29 DIAGNOSIS — E66.813 CLASS 3 SEVERE OBESITY WITHOUT SERIOUS COMORBIDITY WITH BODY MASS INDEX (BMI) OF 40.0 TO 44.9 IN ADULT, UNSPECIFIED OBESITY TYPE: Primary | ICD-10-CM

## 2025-05-29 RX ORDER — TOPIRAMATE 25 MG/1
25 TABLET, FILM COATED ORAL 2 TIMES DAILY
Qty: 60 TABLET | Refills: 5 | Status: SHIPPED | OUTPATIENT
Start: 2025-05-29 | End: 2025-05-30 | Stop reason: SINTOL

## 2025-05-29 RX ORDER — ATORVASTATIN CALCIUM 80 MG/1
80 TABLET, FILM COATED ORAL DAILY
Qty: 30 TABLET | Refills: 11 | Status: SHIPPED | OUTPATIENT
Start: 2025-05-29 | End: 2026-05-29

## 2025-05-29 NOTE — TELEPHONE ENCOUNTER
"    1:42 PM      Results {WERE / WERE NOT:86233} successfully communicated with the {RHEUM PARENT/PATIENT:37714} and they {AMB Acknowledged/Did Not Acknowledge:12096} their understanding.  Rx Refill Request Telephone Encounter    Name:  Kandi Bliss  :  [id number]  Medication Name:  ***  {Dose (Optional):82927::\"***\"}  {Route (Optional):36833::\"***\"}  {Frequency (Optional):34091::\"***\"}  {Quantity (Optional):21473::\"***\"}  {Directions (Optional):65119::\"***\"}  Specific Pharmacy location:  ***  Date of last appointment:  ***  Date of next appointment:  ***  Best number to reach patient:  ***          Transition of Care    Inpatient facility: ***  Discharge diagnosis: ***  Discharged to: ***  Discharge date: ***  Initial Call date: ***  Spoke with patient/caregiver: ***  {Left Message on date (Optional):83868}                                                                 {Left Message on date (Optional):45461}  Do you need assistance  visits prior to your PCP visit: {yes,no:}  Home health care ordered: {yes,no:}  Have you been contacted by home care and have a start of care date: {yes,no:}  Are you taking medications as prescribed at discharge: {yes,no:}  {If not taking medication as prescribed refer to [facility] Pharmacist:33730}  Referral to [facility] Pharmacist: {yes,no:}  Patient advised to bring all medications to PCP follow-up appointment.  Patient advised to follow discharge instructions until provider follow-up.  TCM visit date: ***  TCM provider visit with: ***    {TCM visit must be scheduled to occur within 14 days of discharge (included DC date).  When possible TCM visit should be scheduled within 7 days.:01365}   "

## 2025-05-29 NOTE — TELEPHONE ENCOUNTER
You were to up her Lipitor and she is out of medication also she would like something for her appetite she is eating everything because she is so nervous. Please advise.

## 2025-05-29 NOTE — TELEPHONE ENCOUNTER
Spoke to patient she can not take topiramate (Topamax) 25 mg tablet it gives her bad thoughts and bad dreams please advise.

## 2025-06-02 ENCOUNTER — PHARMACY VISIT (OUTPATIENT)
Dept: PHARMACY | Facility: CLINIC | Age: 55
End: 2025-06-02
Payer: COMMERCIAL

## 2025-06-02 ENCOUNTER — TELEPHONE (OUTPATIENT)
Dept: PRIMARY CARE | Facility: CLINIC | Age: 55
End: 2025-06-02
Payer: COMMERCIAL

## 2025-06-02 ENCOUNTER — PATIENT MESSAGE (OUTPATIENT)
Dept: PRIMARY CARE | Facility: CLINIC | Age: 55
End: 2025-06-02
Payer: COMMERCIAL

## 2025-06-02 DIAGNOSIS — M25.562 CHRONIC PAIN OF BOTH KNEES: ICD-10-CM

## 2025-06-02 DIAGNOSIS — G89.29 CHRONIC PAIN OF BOTH KNEES: ICD-10-CM

## 2025-06-02 DIAGNOSIS — E66.813 CLASS 3 SEVERE OBESITY WITH BODY MASS INDEX (BMI) OF 40.0 TO 44.9 IN ADULT, UNSPECIFIED OBESITY TYPE, UNSPECIFIED WHETHER SERIOUS COMORBIDITY PRESENT: Primary | ICD-10-CM

## 2025-06-02 DIAGNOSIS — R79.9 ABNORMAL BLOOD CHEMISTRY: ICD-10-CM

## 2025-06-02 DIAGNOSIS — E66.813 CLASS 3 SEVERE OBESITY WITHOUT SERIOUS COMORBIDITY WITH BODY MASS INDEX (BMI) OF 40.0 TO 44.9 IN ADULT, UNSPECIFIED OBESITY TYPE: Primary | ICD-10-CM

## 2025-06-02 DIAGNOSIS — M25.561 CHRONIC PAIN OF BOTH KNEES: ICD-10-CM

## 2025-06-02 PROCEDURE — RXMED WILLOW AMBULATORY MEDICATION CHARGE

## 2025-06-02 NOTE — TELEPHONE ENCOUNTER
Patient stated she has been taking topirimate medication but, it is not working well. She wants to know if you would prescribe Adderrall medication instead?

## 2025-06-10 ENCOUNTER — SPECIALTY PHARMACY (OUTPATIENT)
Dept: PHARMACY | Facility: CLINIC | Age: 55
End: 2025-06-10

## 2025-06-10 ENCOUNTER — APPOINTMENT (OUTPATIENT)
Dept: DERMATOLOGY | Facility: CLINIC | Age: 55
End: 2025-06-10
Payer: COMMERCIAL

## 2025-06-11 ENCOUNTER — APPOINTMENT (OUTPATIENT)
Dept: ENDOCRINOLOGY | Facility: CLINIC | Age: 55
End: 2025-06-11
Payer: COMMERCIAL

## 2025-06-11 ENCOUNTER — APPOINTMENT (OUTPATIENT)
Dept: RADIOLOGY | Facility: CLINIC | Age: 55
End: 2025-06-11
Payer: COMMERCIAL

## 2025-06-16 ENCOUNTER — SPECIALTY PHARMACY (OUTPATIENT)
Dept: PHARMACY | Facility: CLINIC | Age: 55
End: 2025-06-16

## 2025-06-16 ENCOUNTER — TELEPHONE (OUTPATIENT)
Dept: PRIMARY CARE | Facility: CLINIC | Age: 55
End: 2025-06-16
Payer: COMMERCIAL

## 2025-06-16 DIAGNOSIS — G47.00 INSOMNIA, UNSPECIFIED: ICD-10-CM

## 2025-06-16 RX ORDER — ZOLPIDEM TARTRATE 10 MG/1
10 TABLET ORAL NIGHTLY PRN
Qty: 30 TABLET | Refills: 1 | Status: SHIPPED | OUTPATIENT
Start: 2025-06-16

## 2025-06-16 NOTE — TELEPHONE ENCOUNTER
Patient stated this is her second request for the following medication.  She is now out of the medication.    zolpidem (Ambien) 10 mg tablet     Please send prescription to [facility] in [place], Phone .

## 2025-06-17 ENCOUNTER — SPECIALTY PHARMACY (OUTPATIENT)
Dept: PHARMACY | Facility: CLINIC | Age: 55
End: 2025-06-17

## 2025-06-19 ENCOUNTER — SPECIALTY PHARMACY (OUTPATIENT)
Dept: PHARMACY | Facility: CLINIC | Age: 55
End: 2025-06-19

## 2025-06-25 ENCOUNTER — APPOINTMENT (OUTPATIENT)
Dept: RADIOLOGY | Facility: CLINIC | Age: 55
End: 2025-06-25
Payer: COMMERCIAL

## 2025-06-26 ENCOUNTER — PHARMACY VISIT (OUTPATIENT)
Dept: PHARMACY | Facility: CLINIC | Age: 55
End: 2025-06-26
Payer: COMMERCIAL

## 2025-06-26 ENCOUNTER — OFFICE VISIT (OUTPATIENT)
Dept: PRIMARY CARE | Facility: CLINIC | Age: 55
End: 2025-06-26
Payer: COMMERCIAL

## 2025-06-26 ENCOUNTER — SPECIALTY PHARMACY (OUTPATIENT)
Dept: PHARMACY | Facility: CLINIC | Age: 55
End: 2025-06-26

## 2025-06-26 VITALS
HEART RATE: 59 BPM | OXYGEN SATURATION: 99 % | SYSTOLIC BLOOD PRESSURE: 136 MMHG | BODY MASS INDEX: 43.75 KG/M2 | DIASTOLIC BLOOD PRESSURE: 74 MMHG | WEIGHT: 224 LBS

## 2025-06-26 DIAGNOSIS — N64.4 BREAST PAIN, LEFT: ICD-10-CM

## 2025-06-26 DIAGNOSIS — R23.9 SKIN CHANGE: Primary | ICD-10-CM

## 2025-06-26 PROCEDURE — RXMED WILLOW AMBULATORY MEDICATION CHARGE

## 2025-06-26 PROCEDURE — 99214 OFFICE O/P EST MOD 30 MIN: CPT | Performed by: INTERNAL MEDICINE

## 2025-06-26 NOTE — PATIENT INSTRUCTIONS
First thing waking up drink 2 glasses of water then take the phentermine   And drink 5-6 more glasses    If you have to drink coffee do that mid morning and just one cup a day   Quit smoking again     Please call our  [phone] to assist with scheduling   Mammogram and ultrasound     We will retry tirzepatide (zepbound) injections once you try the phentermine     Get your blood work in a month so you would be on the phentermine for a month and I will add the hba1c the diabetic test

## 2025-06-26 NOTE — LETTER
June 26, 2025     Patient: Kandi Bliss   YOB: 1970   Date of Visit: 6/26/2025       To Whom It May Concern:    Kandi Bliss was seen in my clinic on 6/26/2025 at 8:00 am. Please excuse Kandi for her absence from work on this day to make the appointment.  She was able to leave our office 8:55 am .     If you have any questions or concerns, please don't hesitate to call.         Sincerely,         Kaleb Barnett MD        CC: No Recipients

## 2025-06-26 NOTE — PROGRESS NOTES
Subjective   Patient ID: Kandi Bliss is a 55 y.o. female who presents for Breast Pain (Sharp pain in left breast the last couple of days, brain fog and trouble remembering things for a couple of months, concerned about her weight, please check cyst on back).    HPI  Patient in for a visit  Dtr got engaged   Left breast pain the last couple of days , feels like her period was on , no nipple discharge  The memory fog , names , absent minded , hard to focus   Worried about her weight, but she has not taken the phentermine   Review of Systems  General: Denies fever, chills, night sweats,  Eyes: Negative for recent visual changes  Ears, Nose, Throat :  Negative for hearing changes, sinus discomfort  Dermatologic: Negative for new skin conditions, rash  Respiratory: Negative for wheezing, shortness of breath, cough  Cardiovascular: Negative for chest pain, palpitations, or leg swelling  Gastrointestinal: Negative for nausea/vomiting, abdominal pain, changes in bowel habits  Genitourinary Negative for Urinary Incontinence  urgency , frequency, discomfort   Musculoskeletal: see hpi  Neurological: Negative for headaches, dizziness    Previous history  Medical History[1]  Surgical History[2]  Social History[3]  Family History[4]  Allergies[5]  Current Outpatient Medications   Medication Instructions    ALPRAZolam (XANAX) 0.5 mg, oral, 2 times daily PRN, To fill 2/5/2025    atorvastatin (LIPITOR) 80 mg, oral, Daily    clobetasol (Temovate) 0.05 % ointment Topical, 2 times daily PRN, Apply to affected areas on arms/feet as needed    dupilumab (Dupixent Syringe) 300 mg/2 mL prefilled syringe INJECT 300 MG (1 SYRINGE) UNDER THE SKIN EVERY OTHER WEEK, AS DIRECTED    fluticasone (Flonase) 50 mcg/actuation nasal spray 2 sprays, Each Nostril, Daily, Shake gently. Before first use, prime pump. After use, clean tip and replace cap.    zolpidem (AMBIEN) 10 mg, oral, Nightly PRN       Objective       Physical Exam  Vital Signs: as  recorded above  General: Well groomed, well nourished   Orientation:  Alert , oriented to time, place , and person   Mood and Affect:  Cooperative , no apparent distress normal affect  Skin: Good color, good turgor  4 cm vertical axis, 4.5 cm horizontal soft firm   Eyes: Extra ocular muscle movements intact, anicteric sclerae  Neck: Supple, full range of movement  Chest: Normal breath sounds, normal chest wall exam, symmetric, good air entry, clear to auscultation  Heart: Regular rate and rhythm, without murmur, gallop, or rubs  Breast Exam:  no lesions, no masses felt, left breast pain worse 2 days at rest , on exam no lesions but with bluish discoloration and tender medial upper quadrant no erythema no inc warmth , no nipple discharge  BACK:  no CTLS spine tenderness, no flank tenderness  Extremities: full range of movement  bilateral UE and bilateral LE,  no lower extremity edema  Neurological: Alert, oriented, cranial nerves II-XII grossly intact except for visual acuity  Sensation:  Intact   Gait: normal steady      Assessment/Plan   Kandi Bliss is a 55 y.o. female who presents for the concerns below:    Problem List Items Addressed This Visit    None    Obesity waking up drink 2 glasses of water then take the phentermine   And drink 5-6 more glasses    If you have to drink coffee do that mid morning and just one cup a day    Quit smoking again     Sleep apnea not treated with cpap , [facility]     BREAST PAIN left breast pain worse 2 days at rest , on exam no lesions but with bluish discoloration and tender medial upper quadrant no erythema no inc warmth     HYPERCHOLESTEROLEMIA PLAN: stable  continue current medications  Follow low cholesterol diet, encouraged high omega 3 fatty acid intake in diet, exercise, weight control. . Will Obtain AST/ALT, fasting lipid profile, creatine kinase  on statin if not done within past 3-6  months . Coenzyme Q10 200 mg a day if able to help increase HDL.    Discussed with:   Return in :  2 months   Portions of this note were generated using digital voice recognition software, and may contain grammatical errors       Kaleb Barnett MD  25  8:19 AM       [1]   Past Medical History:  Diagnosis Date    Abnormal Heart Score CT 2023    Total  24.31    Anxiety     Cardiology follow-up encounter 2023    Quinn NEWELL MD    GERD (gastroesophageal reflux disease)     Glaucoma suspect of both eyes     Hyperlipidemia     Insomnia     Internal derangement of knee, unspecified laterality     Loose body in knee, right knee     Plan: Right Knee Arthroscopy; Meniscectomy 24    Moderate alcohol dependence (Multi)     Obesity     Psoriatic arthritis (Multi)     Sleep apnea     Tobacco abuse     Vertigo     Vitamin D insufficiency    [2]   Past Surgical History:  Procedure Laterality Date    COLONOSCOPY  2021    HYSTERECTOMY      LAPAROTOMY SALPINGO OOPHORECTOMY      MR HEAD ANGIO WO IV CONTRAST  2018    MR HEAD ANGIO WO IV CONTRAST 2018 [facility]    MR NECK ANGIO WO IV CONTRAST  2018    MR NECK ANGIO WO IV CONTRAST 2018 [facility]    TUBAL LIGATION     [3]   Social History  Tobacco Use    Smoking status: Some Days     Current packs/day: 0.00     Types: Cigarettes     Last attempt to quit: 2024     Years since quittin.4     Passive exposure: Never    Smokeless tobacco: Never    Tobacco comments:     Discussed all alternatives for treatment.  Medication versus counseling versus cold turkey.  Will start on nicotine patch 5 minutes   Vaping Use    Vaping status: Never Used   Substance Use Topics    Alcohol use: Not Currently     Alcohol/week: 6.0 standard drinks of alcohol     Types: 6 Cans of beer per week    Drug use: Never   [4]   Family History  Problem Relation Name Age of Onset    Breast cancer Mother      Atrial  fibrillation Mother      Glaucoma Mother      Diabetes Mother      Stroke Father      Other (tonsillar cancer) Sister Winston     Parkinsonism Maternal Grandmother      No Known Problems Child Amorion    [5]   Allergies  Allergen Reactions    Avelox [Moxifloxacin] Diarrhea    Bacitracin Rash    Benzonatate Unknown     PT'S DOESN'T REMEMBER    Latex Itching and Rash    Oxycodone Itching    Penicillins Hives

## 2025-07-01 ENCOUNTER — APPOINTMENT (OUTPATIENT)
Dept: RADIOLOGY | Facility: CLINIC | Age: 55
End: 2025-07-01
Payer: COMMERCIAL

## 2025-07-02 ENCOUNTER — HOSPITAL ENCOUNTER (OUTPATIENT)
Dept: RADIOLOGY | Facility: CLINIC | Age: 55
Discharge: HOME | End: 2025-07-02
Payer: COMMERCIAL

## 2025-07-02 VITALS — HEIGHT: 60 IN | BODY MASS INDEX: 44.15 KG/M2 | WEIGHT: 224.87 LBS

## 2025-07-02 DIAGNOSIS — N64.4 BREAST PAIN, LEFT: ICD-10-CM

## 2025-07-02 PROCEDURE — 77062 BREAST TOMOSYNTHESIS BI: CPT

## 2025-07-02 PROCEDURE — 77066 DX MAMMO INCL CAD BI: CPT | Performed by: RADIOLOGY

## 2025-07-02 PROCEDURE — 77062 BREAST TOMOSYNTHESIS BI: CPT | Performed by: RADIOLOGY

## 2025-07-14 ENCOUNTER — SPECIALTY PHARMACY (OUTPATIENT)
Dept: PHARMACY | Facility: CLINIC | Age: 55
End: 2025-07-14

## 2025-07-14 NOTE — PROGRESS NOTES
[facility] Clinical Note  Patient Reassessment     Introduction  Kandi Bliss is a 55 y.o. female who is on the specialty pharmacy service for management of: Dermatology Core.      [facility] supplied medication: dupilumab (Dupixent Syringe) 300 mg/2 mL prefilled syringe  : INJECT 300 MG (1 SYRINGE) UNDER THE SKIN EVERY OTHER WEEK, AS DIRECTED        Duration of therapy: Maintenance    The most recent encounter visit with the referring prescriber Grant Tuttle MD PhD  on 02/25/25 was reviewed.  Pharmacy will continue to collaborate in the care of this patient with the referring prescriber.    Discussion  Kandi was contacted on 7/14/2025 at 9:53 AM for a pharmacy visit with encounter number [id number] from:   [facility]  [address] [place] [id number]  Dept: [phone]  Dept Fax: [phone]  Kandi consented to a/an Telephone visit, which was performed.    Efficacy  Patient has developed new symptoms of condition: No  Patient/caregiver feels medication is affecting the disease state: Patient reports about a 70% improvement to skin with Dupixent. She states her hands have cleared but still has unresolved areas on her one foot and underarm. Patient denies any recent flares and reports still using topicals every 2 weeks.    Goals  Provided education on goals and possible outcomes of therapy:  Adherence with therapy  Timely completion of appropriate labs  Timely and appropriate follow up with provider  Identify and address medication interactions with presciption medications, OTC medications and supplements  Optimize or maintain quality of life  Dermatology: Prevent or reduce disease flares  Reduce pain, itchiness, inflammation and body surface area affected by atopic dermatitis  Patient has documented target(s) for goals of therapy: Yes    Targets       Target Due Progress Assessment Last Assessed Completed Completed By Outcome Source      "Goal: Prevent and reduce disease flares 11/14/2025 -- -- -- -- -- --         Goal: Reduce use of ancillary or \"prn\" medications 11/14/2025 -- -- -- -- -- --         Goal: Prevent and reduce disease flares 5/16/2025 On Track (Improving) 7/14/2025 7/14/2025 Francoise Jaime PharmD On Track --    Reports 70% improvement overall. States still is affected on one foot and underarm. Denies any recent flares.       Goal: Reduce use of ancillary or \"prn\" medications 5/16/2025 On Track (Unchanging) 7/14/2025 7/14/2025 Francoise Jaime PharmD On Track --    Reports still using topicals. Same use of every 2 weeks - unchanged.       Goal: Prevent and reduce disease flares 7/17/2025 -- -- 1/16/2025 Mireya Douglas PharmD On Track --    Hands have cleared up after restarting Dupixent (did not take for ~1.5 months because of arthritis)       Goal: Reduce use of ancillary or \"prn\" medications 7/17/2025 -- -- 1/16/2025 Mireya Douglas PharmD On Track --    Uses topical medications daily              Tolerance  Patient has experienced side effects from this medication: No  Changes to current therapy regimen: No    The follow-up timeline was discussed. Every person responds to and reacts to therapy differently. Patient should be assessed for efficacy and tolerability in approximately: 6 months            Adherence  Patient Information  Informant: Self (Patient)  Demonstrates Understanding of Importance of Adherence: Yes  Does the patient have any barriers to self-administration (including physical and mental?): No  Medication Information  Medication: dupilumab (Dupixent Syringe)  Patient Reported Missed Doses in the Last 4 Weeks: 0  Estimated Medication Adherence Level: Good  Adherence Estimation Source: Claims history  Barriers to Adherence: No Problems identified   The importance of adherence was discussed and patient/caregiver was advised to take the medication as prescribed by their provider. Encouraged patient/caregiver to call " physician's office or specialty pharmacy if they have a question regarding a missed dose.    General Assessment  Changes to home medications, OTCs or supplements: No  Current Medications[1]  Reported new allergies: No  Reported new medical conditions: No  Additional monitoring reviewed: Dermatology- No lab monitoring needed- There are no routine laboratory monitoring parameters for this medication  Is laboratory follow up needed? No    Advised to contact the pharmacy if there are any changes to the patient's medication list, including prescriptions, OTC medications, herbal products, or supplements.    Impression/Plan  This patient has not been identified as high risk due to Lack of high risk qualifiers.  The following action was taken:N/A          QOL/Patient Satisfaction  Rate your quality of life on scale of 1-10: 8  Rate your satisfaction with  Specialty Pharmacy on scale of 1-10: 4 (Has been having billing issues with the pharmacy team)    Provided contact information ([phone]) for [facility] and reviewed dispensing process, refill timeline and patient management follow up. Confirmed understanding of education conducted during assessment. All questions and concerns were addressed and patient/caregiver was encouraged to reach out for additional questions or concerns.    Based on the patient's diagnosis, medication list, progress towards goals, adherence, tolerance, and medication list, medication remains appropriate: Therapy remains appropriate (I attest)    Francoise Jaime, PharmD       [1]   Current Outpatient Medications   Medication Sig Dispense Refill    ALPRAZolam (Xanax) 0.5 mg tablet Take 1 tablet (0.5 mg) by mouth 2 times a day as needed for anxiety. To fill 2/5/2025 60 tablet 0    atorvastatin (Lipitor) 80 mg tablet Take 1 tablet (80 mg) by mouth once daily. 30 tablet 11    clobetasol (Temovate) 0.05 % ointment Apply topically 2 times a day as needed (skin  inflammation). Apply to affected areas on arms/feet as needed 45 g 3    dupilumab (Dupixent Syringe) 300 mg/2 mL prefilled syringe INJECT 300 MG (1 SYRINGE) UNDER THE SKIN EVERY OTHER WEEK, AS DIRECTED 4 mL 7    fluticasone (Flonase) 50 mcg/actuation nasal spray Administer 2 sprays into each nostril once daily. Shake gently. Before first use, prime pump. After use, clean tip and replace cap. (Patient taking differently: Administer 2 sprays into each nostril if needed. Shake gently. Before first use, prime pump. After use, clean tip and replace cap.) 16 g 5    zolpidem (Ambien) 10 mg tablet Take 1 tablet (10 mg) by mouth as needed at bedtime for sleep. 30 tablet 1     No current facility-administered medications for this visit.

## 2025-07-16 ENCOUNTER — APPOINTMENT (OUTPATIENT)
Dept: RHEUMATOLOGY | Facility: CLINIC | Age: 55
End: 2025-07-16
Payer: COMMERCIAL

## 2025-07-21 ENCOUNTER — SPECIALTY PHARMACY (OUTPATIENT)
Dept: PHARMACY | Facility: CLINIC | Age: 55
End: 2025-07-21

## 2025-07-21 ENCOUNTER — PHARMACY VISIT (OUTPATIENT)
Dept: PHARMACY | Facility: CLINIC | Age: 55
End: 2025-07-21
Payer: COMMERCIAL

## 2025-07-21 PROCEDURE — RXMED WILLOW AMBULATORY MEDICATION CHARGE

## 2025-07-28 ENCOUNTER — SPECIALTY PHARMACY (OUTPATIENT)
Dept: PHARMACY | Facility: CLINIC | Age: 55
End: 2025-07-28

## 2025-07-30 ENCOUNTER — SPECIALTY PHARMACY (OUTPATIENT)
Dept: PHARMACY | Facility: CLINIC | Age: 55
End: 2025-07-30

## 2025-08-01 ENCOUNTER — PATIENT MESSAGE (OUTPATIENT)
Dept: PRIMARY CARE | Facility: CLINIC | Age: 55
End: 2025-08-01
Payer: COMMERCIAL

## 2025-08-01 DIAGNOSIS — R41.840 ATTENTION DEFICIT: Primary | ICD-10-CM

## 2025-08-21 DIAGNOSIS — G47.00 INSOMNIA, UNSPECIFIED: ICD-10-CM

## 2025-08-22 ENCOUNTER — APPOINTMENT (OUTPATIENT)
Dept: PRIMARY CARE | Facility: CLINIC | Age: 55
End: 2025-08-22
Payer: COMMERCIAL

## 2025-08-22 RX ORDER — ZOLPIDEM TARTRATE 10 MG/1
10 TABLET ORAL NIGHTLY PRN
Qty: 30 TABLET | Refills: 0 | Status: SHIPPED | OUTPATIENT
Start: 2025-08-22

## 2025-08-27 ENCOUNTER — APPOINTMENT (OUTPATIENT)
Dept: SURGERY | Facility: CLINIC | Age: 55
End: 2025-08-27
Payer: COMMERCIAL

## 2025-08-27 ENCOUNTER — OFFICE VISIT (OUTPATIENT)
Dept: PRIMARY CARE | Facility: CLINIC | Age: 55
End: 2025-08-27
Payer: COMMERCIAL

## 2025-08-27 VITALS
BODY MASS INDEX: 42.97 KG/M2 | SYSTOLIC BLOOD PRESSURE: 134 MMHG | WEIGHT: 220 LBS | OXYGEN SATURATION: 98 % | DIASTOLIC BLOOD PRESSURE: 76 MMHG | HEART RATE: 64 BPM

## 2025-08-27 DIAGNOSIS — R79.9 ABNORMAL BLOOD CHEMISTRY: Primary | ICD-10-CM

## 2025-08-27 DIAGNOSIS — R73.03 PREDIABETES: ICD-10-CM

## 2025-08-27 DIAGNOSIS — D75.839 THROMBOCYTOSIS: ICD-10-CM

## 2025-08-27 DIAGNOSIS — Z13.29 SCREENING FOR ENDOCRINE/METABOLIC/IMMUNITY DISORDERS: ICD-10-CM

## 2025-08-27 DIAGNOSIS — Z13.0 SCREENING FOR ENDOCRINE, METABOLIC AND IMMUNITY DISORDER: ICD-10-CM

## 2025-08-27 DIAGNOSIS — Z13.0 SCREENING FOR DEFICIENCY ANEMIA: ICD-10-CM

## 2025-08-27 DIAGNOSIS — B37.9 MONILIASIS: ICD-10-CM

## 2025-08-27 DIAGNOSIS — Z13.29 SCREENING FOR ENDOCRINE, METABOLIC AND IMMUNITY DISORDER: ICD-10-CM

## 2025-08-27 DIAGNOSIS — Z13.228 SCREENING FOR ENDOCRINE, METABOLIC AND IMMUNITY DISORDER: ICD-10-CM

## 2025-08-27 DIAGNOSIS — Z13.0 SCREENING FOR ENDOCRINE/METABOLIC/IMMUNITY DISORDERS: ICD-10-CM

## 2025-08-27 DIAGNOSIS — L73.9 FOLLICULITIS: ICD-10-CM

## 2025-08-27 DIAGNOSIS — Z13.228 SCREENING FOR ENDOCRINE/METABOLIC/IMMUNITY DISORDERS: ICD-10-CM

## 2025-08-27 DIAGNOSIS — F17.200 TOBACCO DEPENDENCE: ICD-10-CM

## 2025-08-27 DIAGNOSIS — E66.813 CLASS 3 SEVERE OBESITY WITHOUT SERIOUS COMORBIDITY WITH BODY MASS INDEX (BMI) OF 40.0 TO 44.9 IN ADULT: ICD-10-CM

## 2025-08-27 PROCEDURE — 99214 OFFICE O/P EST MOD 30 MIN: CPT | Performed by: INTERNAL MEDICINE

## 2025-08-27 RX ORDER — CLOTRIMAZOLE AND BETAMETHASONE DIPROPIONATE 10; .64 MG/G; MG/G
1 CREAM TOPICAL 2 TIMES DAILY
Qty: 45 G | Refills: 1 | Status: SHIPPED | OUTPATIENT
Start: 2025-08-27 | End: 2025-12-25

## 2025-08-27 RX ORDER — DOXYCYCLINE 100 MG/1
100 CAPSULE ORAL 2 TIMES DAILY
Qty: 10 CAPSULE | Refills: 0 | Status: SHIPPED | OUTPATIENT
Start: 2025-08-27 | End: 2025-09-01

## 2025-08-27 ASSESSMENT — PATIENT HEALTH QUESTIONNAIRE - PHQ9
1. LITTLE INTEREST OR PLEASURE IN DOING THINGS: NOT AT ALL
SUM OF ALL RESPONSES TO PHQ9 QUESTIONS 1 AND 2: 0
SUM OF ALL RESPONSES TO PHQ9 QUESTIONS 1 AND 2: 0
2. FEELING DOWN, DEPRESSED OR HOPELESS: NOT AT ALL
2. FEELING DOWN, DEPRESSED OR HOPELESS: NOT AT ALL
1. LITTLE INTEREST OR PLEASURE IN DOING THINGS: NOT AT ALL

## 2025-08-30 LAB
ALBUMIN SERPL-MCNC: 4.3 G/DL (ref 3.6–5.1)
ALBUMIN/CREAT UR: NORMAL
ALP SERPL-CCNC: 80 U/L (ref 37–153)
ALT SERPL-CCNC: 24 U/L (ref 6–29)
ANION GAP SERPL CALCULATED.4IONS-SCNC: 7 MMOL/L (CALC) (ref 7–17)
AST SERPL-CCNC: 14 U/L (ref 10–35)
BASOPHILS # BLD AUTO: 65 CELLS/UL (ref 0–200)
BASOPHILS NFR BLD AUTO: 0.6 %
BILIRUB SERPL-MCNC: 0.4 MG/DL (ref 0.2–1.2)
BUN SERPL-MCNC: 19 MG/DL (ref 7–25)
CALCIUM SERPL-MCNC: 9.5 MG/DL (ref 8.6–10.4)
CHLORIDE SERPL-SCNC: 106 MMOL/L (ref 98–110)
CO2 SERPL-SCNC: 28 MMOL/L (ref 20–32)
CREAT SERPL-MCNC: 0.62 MG/DL (ref 0.5–1.03)
CREAT UR-MCNC: NORMAL MG/DL
EGFRCR SERPLBLD CKD-EPI 2021: 105 ML/MIN/1.73M2
EOSINOPHIL # BLD AUTO: 356 CELLS/UL (ref 15–500)
EOSINOPHIL NFR BLD AUTO: 3.3 %
ERYTHROCYTE [DISTWIDTH] IN BLOOD BY AUTOMATED COUNT: 13.2 % (ref 11–15)
EST. AVERAGE GLUCOSE BLD GHB EST-MCNC: 128 MG/DL
EST. AVERAGE GLUCOSE BLD GHB EST-SCNC: 7.1 MMOL/L
GLUCOSE SERPL-MCNC: 101 MG/DL (ref 65–99)
HBA1C MFR BLD: 6.1 %
HCT VFR BLD AUTO: 41.3 % (ref 35–45)
HGB BLD-MCNC: 13.8 G/DL (ref 11.7–15.5)
LYMPHOCYTES # BLD AUTO: 3110 CELLS/UL (ref 850–3900)
LYMPHOCYTES NFR BLD AUTO: 28.8 %
MCH RBC QN AUTO: 30.5 PG (ref 27–33)
MCHC RBC AUTO-ENTMCNC: 33.4 G/DL (ref 32–36)
MCV RBC AUTO: 91.4 FL (ref 80–100)
MICROALBUMIN UR-MCNC: NORMAL
MONOCYTES # BLD AUTO: 886 CELLS/UL (ref 200–950)
MONOCYTES NFR BLD AUTO: 8.2 %
NEUTROPHILS # BLD AUTO: 6383 CELLS/UL (ref 1500–7800)
NEUTROPHILS NFR BLD AUTO: 59.1 %
PLATELET # BLD AUTO: 410 THOUSAND/UL (ref 140–400)
PMV BLD REES-ECKER: 10.6 FL (ref 7.5–12.5)
POTASSIUM SERPL-SCNC: 4.7 MMOL/L (ref 3.5–5.3)
PROT SERPL-MCNC: 7.3 G/DL (ref 6.1–8.1)
RBC # BLD AUTO: 4.52 MILLION/UL (ref 3.8–5.1)
SODIUM SERPL-SCNC: 141 MMOL/L (ref 135–146)
TSH SERPL-ACNC: 0.88 MIU/L
WBC # BLD AUTO: 10.8 THOUSAND/UL (ref 3.8–10.8)

## 2025-09-02 LAB
ALBUMIN SERPL-MCNC: 4.3 G/DL (ref 3.6–5.1)
ALBUMIN/CREAT UR: 4 MG/G CREAT
ALP SERPL-CCNC: 80 U/L (ref 37–153)
ALT SERPL-CCNC: 24 U/L (ref 6–29)
ANION GAP SERPL CALCULATED.4IONS-SCNC: 7 MMOL/L (CALC) (ref 7–17)
AST SERPL-CCNC: 14 U/L (ref 10–35)
BASOPHILS # BLD AUTO: 65 CELLS/UL (ref 0–200)
BASOPHILS NFR BLD AUTO: 0.6 %
BILIRUB SERPL-MCNC: 0.4 MG/DL (ref 0.2–1.2)
BUN SERPL-MCNC: 19 MG/DL (ref 7–25)
CALCIUM SERPL-MCNC: 9.5 MG/DL (ref 8.6–10.4)
CHLORIDE SERPL-SCNC: 106 MMOL/L (ref 98–110)
CO2 SERPL-SCNC: 28 MMOL/L (ref 20–32)
CREAT SERPL-MCNC: 0.62 MG/DL (ref 0.5–1.03)
CREAT UR-MCNC: 107 MG/DL (ref 20–275)
EGFRCR SERPLBLD CKD-EPI 2021: 105 ML/MIN/1.73M2
EOSINOPHIL # BLD AUTO: 356 CELLS/UL (ref 15–500)
EOSINOPHIL NFR BLD AUTO: 3.3 %
ERYTHROCYTE [DISTWIDTH] IN BLOOD BY AUTOMATED COUNT: 13.2 % (ref 11–15)
EST. AVERAGE GLUCOSE BLD GHB EST-MCNC: 128 MG/DL
EST. AVERAGE GLUCOSE BLD GHB EST-SCNC: 7.1 MMOL/L
GLUCOSE SERPL-MCNC: 101 MG/DL (ref 65–99)
HBA1C MFR BLD: 6.1 %
HCT VFR BLD AUTO: 41.3 % (ref 35–45)
HGB BLD-MCNC: 13.8 G/DL (ref 11.7–15.5)
LYMPHOCYTES # BLD AUTO: 3110 CELLS/UL (ref 850–3900)
LYMPHOCYTES NFR BLD AUTO: 28.8 %
MCH RBC QN AUTO: 30.5 PG (ref 27–33)
MCHC RBC AUTO-ENTMCNC: 33.4 G/DL (ref 32–36)
MCV RBC AUTO: 91.4 FL (ref 80–100)
MICROALBUMIN UR-MCNC: 0.4 MG/DL
MONOCYTES # BLD AUTO: 886 CELLS/UL (ref 200–950)
MONOCYTES NFR BLD AUTO: 8.2 %
NEUTROPHILS # BLD AUTO: 6383 CELLS/UL (ref 1500–7800)
NEUTROPHILS NFR BLD AUTO: 59.1 %
PLATELET # BLD AUTO: 410 THOUSAND/UL (ref 140–400)
PMV BLD REES-ECKER: 10.6 FL (ref 7.5–12.5)
POTASSIUM SERPL-SCNC: 4.7 MMOL/L (ref 3.5–5.3)
PROT SERPL-MCNC: 7.3 G/DL (ref 6.1–8.1)
RBC # BLD AUTO: 4.52 MILLION/UL (ref 3.8–5.1)
SODIUM SERPL-SCNC: 141 MMOL/L (ref 135–146)
TSH SERPL-ACNC: 0.88 MIU/L
WBC # BLD AUTO: 10.8 THOUSAND/UL (ref 3.8–10.8)

## 2025-09-11 ENCOUNTER — APPOINTMENT (OUTPATIENT)
Dept: BEHAVIORAL HEALTH | Facility: CLINIC | Age: 55
End: 2025-09-11
Payer: COMMERCIAL

## 2025-09-12 ENCOUNTER — APPOINTMENT (OUTPATIENT)
Dept: ENDOCRINOLOGY | Facility: CLINIC | Age: 55
End: 2025-09-12
Payer: COMMERCIAL

## 2025-10-06 ENCOUNTER — APPOINTMENT (OUTPATIENT)
Dept: ENDOCRINOLOGY | Facility: CLINIC | Age: 55
End: 2025-10-06
Payer: COMMERCIAL

## 2025-11-14 ENCOUNTER — APPOINTMENT (OUTPATIENT)
Dept: PRIMARY CARE | Facility: CLINIC | Age: 55
End: 2025-11-14
Payer: COMMERCIAL